# Patient Record
Sex: FEMALE | Race: WHITE | NOT HISPANIC OR LATINO | Employment: OTHER | URBAN - METROPOLITAN AREA
[De-identification: names, ages, dates, MRNs, and addresses within clinical notes are randomized per-mention and may not be internally consistent; named-entity substitution may affect disease eponyms.]

---

## 2017-04-26 ENCOUNTER — OFFICE VISIT (OUTPATIENT)
Dept: FAMILY MEDICINE CLINIC | Facility: CLINIC | Age: 82
End: 2017-04-26

## 2017-04-26 VITALS
HEIGHT: 65 IN | HEART RATE: 70 BPM | RESPIRATION RATE: 16 BRPM | TEMPERATURE: 97.7 F | WEIGHT: 131 LBS | DIASTOLIC BLOOD PRESSURE: 70 MMHG | BODY MASS INDEX: 21.83 KG/M2 | OXYGEN SATURATION: 95 % | SYSTOLIC BLOOD PRESSURE: 110 MMHG

## 2017-04-26 DIAGNOSIS — R60.0 PEDAL EDEMA: ICD-10-CM

## 2017-04-26 DIAGNOSIS — K21.9 GASTROESOPHAGEAL REFLUX DISEASE WITHOUT ESOPHAGITIS: ICD-10-CM

## 2017-04-26 DIAGNOSIS — I10 ESSENTIAL HYPERTENSION: Primary | ICD-10-CM

## 2017-04-26 DIAGNOSIS — E78.2 MIXED HYPERLIPIDEMIA: ICD-10-CM

## 2017-04-26 DIAGNOSIS — I87.2 VENOUS INSUFFICIENCY: ICD-10-CM

## 2017-04-26 DIAGNOSIS — M19.90 ARTHRITIS: ICD-10-CM

## 2017-04-26 PROCEDURE — 99213 OFFICE O/P EST LOW 20 MIN: CPT | Performed by: PHYSICIAN ASSISTANT

## 2017-04-26 NOTE — PROGRESS NOTES
Subjective   Marie Delgado is a 87 y.o. female.     History of Present Illness   Marie Delgado 87 y.o. female who presents today for routine follow up check and medication refills.  she has a history of   Patient Active Problem List   Diagnosis   • Acid reflux   • Hypertension   • Arthritis   • Hyperlipidemia   • Venous insufficiency   • Onychomycosis   • Pedal edema   .  Since the last visit, she has overall felt tired.  She has Hypertenision and is well controlled on medication and GERD and is well controlled on PPI medication.  she has been compliant with current medications have reviewed them.  The patient denies medication side effects.    Still has leg swelling and intolerant to stockings;  Tib/fib erythema and thick skin, not weeping yet    Has tickle cough and declines suggested work up with pulm and cardio;  Still also be GERD r/t  Had CXR Dec 2016   She declines prevention and statin.  She declines cardio work up    Ultram is not helping OA knees and will try Voltaren gel    She is taking Advil and did want her to stop;  Long term use of NSAIDs can lead to heart disease and strokes, as well as GI bleeding/ulcers, and cause kidney disease.     The following portions of the patient's history were reviewed and updated as appropriate: allergies, current medications, past family history, past medical history, past social history, past surgical history and problem list.    Review of Systems   Constitutional: Positive for fatigue. Negative for activity change, appetite change and unexpected weight change.   HENT: Negative for nosebleeds and trouble swallowing.    Eyes: Negative for pain and visual disturbance.   Respiratory: Positive for cough. Negative for chest tightness, shortness of breath and wheezing.    Cardiovascular: Positive for leg swelling. Negative for chest pain and palpitations.   Gastrointestinal: Negative for abdominal pain and blood in stool.   Endocrine: Negative.    Genitourinary: Negative for  difficulty urinating and hematuria.   Musculoskeletal: Positive for arthralgias. Negative for joint swelling.   Skin: Negative for color change and rash.   Allergic/Immunologic: Negative.    Neurological: Negative for syncope and speech difficulty.   Hematological: Negative for adenopathy.   Psychiatric/Behavioral: Negative for agitation and confusion.   All other systems reviewed and are negative.      Objective   Physical Exam   Constitutional: She is oriented to person, place, and time. She appears well-developed and well-nourished. No distress.   HENT:   Head: Normocephalic and atraumatic.   Eyes: Conjunctivae and EOM are normal. Pupils are equal, round, and reactive to light. Right eye exhibits no discharge. Left eye exhibits no discharge. No scleral icterus.   Neck: Normal range of motion. Neck supple. No tracheal deviation present. No thyromegaly present.   Cardiovascular: Normal rate, regular rhythm, normal heart sounds, intact distal pulses and normal pulses.  Exam reveals no gallop.    No murmur heard.  3+ pedal edema;  Venous stasis skin thickening tib/fib; shiny, almost weepy   Pulmonary/Chest: Effort normal and breath sounds normal. No respiratory distress. She has no wheezes. She has no rales.   Abdominal: Soft. There is no tenderness.   Musculoskeletal: Normal range of motion.   Right second toe overlaps first   Neurological: She is alert and oriented to person, place, and time.   Skin: Skin is warm. No rash noted. No erythema. No pallor.   Fungal toenails and thick, overgrown, irreg   Psychiatric: She has a normal mood and affect. Her behavior is normal. Judgment and thought content normal.   Nursing note and vitals reviewed.      Assessment/Plan   Diagnoses and all orders for this visit:    Essential hypertension  -     Comprehensive Metabolic Panel  -     T4, Free  -     TSH  -     CBC & Differential    Venous insufficiency  -     Comprehensive Metabolic Panel  -     T4, Free  -     TSH  -     CBC  & Differential    Pedal edema  -     Comprehensive Metabolic Panel  -     T4, Free  -     TSH  -     CBC & Differential    Gastroesophageal reflux disease without esophagitis  -     Comprehensive Metabolic Panel  -     T4, Free  -     TSH  -     CBC & Differential    Arthritis  -     Comprehensive Metabolic Panel  -     T4, Free  -     TSH  -     CBC & Differential    Mixed hyperlipidemia  -     Comprehensive Metabolic Panel  -     T4, Free  -     TSH  -     CBC & Differential    Other orders  -     diclofenac (VOLTAREN) 1 % gel gel; Apply 4 g topically 4 (Four) Times a Day As Needed (knee pain). Rub in well

## 2017-04-26 NOTE — PATIENT INSTRUCTIONS
I will refill Indapamide after labs    Low glycemic index diet  Exercise 30 minutes most days of the week  Make sure you get results on any labs or tests we ordered today  We discussed medications and how to take them as prescribed  Sleep 6-8 hours each night if possible  If you have not signed up for MyChart, please activate your code ASAP from your After Visit Summary today    LDL goal <100  LDL goal if heart disease <70  HDL goal >60  Triglyceride goal <150  BP goal =<130/80  Fasting glucose <100    Try topical gel for pain    Consider ortho consult or pain management

## 2017-06-06 ENCOUNTER — TELEPHONE (OUTPATIENT)
Dept: FAMILY MEDICINE CLINIC | Facility: CLINIC | Age: 82
End: 2017-06-06

## 2017-06-06 NOTE — TELEPHONE ENCOUNTER
Pt is having a lot of vulvar itch and burning. She has tired OTC things and nothing is working.

## 2017-07-25 ENCOUNTER — OFFICE VISIT (OUTPATIENT)
Dept: FAMILY MEDICINE CLINIC | Facility: CLINIC | Age: 82
End: 2017-07-25

## 2017-07-25 VITALS
DIASTOLIC BLOOD PRESSURE: 90 MMHG | HEIGHT: 65 IN | RESPIRATION RATE: 16 BRPM | TEMPERATURE: 97.7 F | BODY MASS INDEX: 28.49 KG/M2 | WEIGHT: 171 LBS | SYSTOLIC BLOOD PRESSURE: 150 MMHG | HEART RATE: 64 BPM

## 2017-07-25 DIAGNOSIS — E55.9 VITAMIN D DEFICIENCY DISEASE: ICD-10-CM

## 2017-07-25 DIAGNOSIS — I10 ESSENTIAL HYPERTENSION: Primary | ICD-10-CM

## 2017-07-25 DIAGNOSIS — R13.12 OROPHARYNGEAL DYSPHAGIA: ICD-10-CM

## 2017-07-25 DIAGNOSIS — E78.2 MIXED HYPERLIPIDEMIA: ICD-10-CM

## 2017-07-25 DIAGNOSIS — K21.9 GASTROESOPHAGEAL REFLUX DISEASE WITHOUT ESOPHAGITIS: ICD-10-CM

## 2017-07-25 DIAGNOSIS — I87.2 VENOUS INSUFFICIENCY: ICD-10-CM

## 2017-07-25 LAB
25(OH)D3+25(OH)D2 SERPL-MCNC: 35.6 NG/ML (ref 30–100)
ALBUMIN SERPL-MCNC: 4.3 G/DL (ref 3.5–5.2)
ALBUMIN/GLOB SERPL: 1.4 G/DL
ALP SERPL-CCNC: 89 U/L (ref 39–117)
ALT SERPL-CCNC: 12 U/L (ref 1–33)
AST SERPL-CCNC: 17 U/L (ref 1–32)
BASOPHILS # BLD AUTO: 0.05 10*3/MM3 (ref 0–0.2)
BASOPHILS NFR BLD AUTO: 0.8 % (ref 0–1.5)
BILIRUB SERPL-MCNC: 0.6 MG/DL (ref 0.1–1.2)
BUN SERPL-MCNC: 12 MG/DL (ref 8–23)
BUN/CREAT SERPL: 12.6 (ref 7–25)
CALCIUM SERPL-MCNC: 9.9 MG/DL (ref 8.6–10.5)
CHLORIDE SERPL-SCNC: 101 MMOL/L (ref 98–107)
CHOLEST SERPL-MCNC: 239 MG/DL (ref 0–200)
CO2 SERPL-SCNC: 26.6 MMOL/L (ref 22–29)
CREAT SERPL-MCNC: 0.95 MG/DL (ref 0.57–1)
EOSINOPHIL # BLD AUTO: 0.11 10*3/MM3 (ref 0–0.7)
EOSINOPHIL NFR BLD AUTO: 1.7 % (ref 0.3–6.2)
ERYTHROCYTE [DISTWIDTH] IN BLOOD BY AUTOMATED COUNT: 13.4 % (ref 11.7–13)
FOLATE SERPL-MCNC: >20 NG/ML (ref 4.78–24.2)
GLOBULIN SER CALC-MCNC: 3 GM/DL
GLUCOSE SERPL-MCNC: 98 MG/DL (ref 65–99)
HCT VFR BLD AUTO: 39.8 % (ref 35.6–45.5)
HDLC SERPL-MCNC: 61 MG/DL (ref 40–60)
HGB BLD-MCNC: 12.8 G/DL (ref 11.9–15.5)
IMM GRANULOCYTES # BLD: 0 10*3/MM3 (ref 0–0.03)
IMM GRANULOCYTES NFR BLD: 0 % (ref 0–0.5)
LDLC SERPL CALC-MCNC: 159 MG/DL (ref 0–100)
LYMPHOCYTES # BLD AUTO: 1.84 10*3/MM3 (ref 0.9–4.8)
LYMPHOCYTES NFR BLD AUTO: 27.7 % (ref 19.6–45.3)
MCH RBC QN AUTO: 30.9 PG (ref 26.9–32)
MCHC RBC AUTO-ENTMCNC: 32.2 G/DL (ref 32.4–36.3)
MCV RBC AUTO: 96.1 FL (ref 80.5–98.2)
MONOCYTES # BLD AUTO: 0.6 10*3/MM3 (ref 0.2–1.2)
MONOCYTES NFR BLD AUTO: 9 % (ref 5–12)
NEUTROPHILS # BLD AUTO: 4.04 10*3/MM3 (ref 1.9–8.1)
NEUTROPHILS NFR BLD AUTO: 60.8 % (ref 42.7–76)
PLATELET # BLD AUTO: 271 10*3/MM3 (ref 140–500)
POTASSIUM SERPL-SCNC: 3.5 MMOL/L (ref 3.5–5.2)
PROT SERPL-MCNC: 7.3 G/DL (ref 6–8.5)
RBC # BLD AUTO: 4.14 10*6/MM3 (ref 3.9–5.2)
SODIUM SERPL-SCNC: 141 MMOL/L (ref 136–145)
T4 FREE SERPL-MCNC: 1.43 NG/DL (ref 0.93–1.7)
TRIGL SERPL-MCNC: 93 MG/DL (ref 0–150)
TSH SERPL DL<=0.005 MIU/L-ACNC: 3.82 MIU/ML (ref 0.27–4.2)
VIT B12 SERPL-MCNC: 719 PG/ML (ref 211–946)
VLDLC SERPL CALC-MCNC: 18.6 MG/DL (ref 5–40)
WBC # BLD AUTO: 6.64 10*3/MM3 (ref 4.5–10.7)

## 2017-07-25 PROCEDURE — 99213 OFFICE O/P EST LOW 20 MIN: CPT | Performed by: PHYSICIAN ASSISTANT

## 2017-07-25 RX ORDER — FLUCONAZOLE 150 MG/1
150 TABLET ORAL ONCE
Qty: 1 TABLET | Refills: 2 | Status: SHIPPED | OUTPATIENT
Start: 2017-07-25 | End: 2017-07-25

## 2017-07-25 RX ORDER — OMEPRAZOLE 20 MG/1
20 CAPSULE, DELAYED RELEASE ORAL DAILY
Qty: 30 CAPSULE | Refills: 11 | Status: SHIPPED | OUTPATIENT
Start: 2017-07-25 | End: 2018-01-01 | Stop reason: SDUPTHER

## 2017-07-25 RX ORDER — INDAPAMIDE 2.5 MG/1
2.5 TABLET, FILM COATED ORAL EVERY MORNING
Qty: 30 TABLET | Refills: 5 | Status: SHIPPED | OUTPATIENT
Start: 2017-07-25 | End: 2018-01-01 | Stop reason: HOSPADM

## 2017-07-25 NOTE — PATIENT INSTRUCTIONS
Low glycemic index diet  Exercise 30 minutes most days of the week  Make sure you get results on any labs or tests we ordered today  We discussed medications and how to take them as prescribed  Sleep 6-8 hours each night if possible  If you have not signed up for [place], please activate your code ASAP from your After Visit Summary today    LDL goal <100  LDL goal if heart disease <70  HDL goal >60  Triglyceride goal <150  BP goal =<130/80  Fasting glucose <100    Labs today  Restart meds  Follow up one month

## 2017-07-25 NOTE — PROGRESS NOTES
Subjective   Marie Delgado is a 88 y.o. female.     History of Present Illness   Marie Delgado 88 y.o. female who presents for evaluation of dysphagia and hoarseness. Symptoms have been present for several years .  The condition is aggravated by food . she is experiencing dsyphagia.  Alleviating factors are PPI Rx with some help, but still symptoms . Patient denies diarrhea, constipation, abdominal pain, bright red blood in stool, reflux and vomiting her past medical history is notable for GERD.  Patient denies recent travel.      Saw Dr Huerta years ago and had EGD with dilation    Will have her check with pharmacy on crushing pills  She has wounds from venous stasis legs and declines referral to wound and vascular doc    Has not taken her meds in last 5 days;  bp is up today;  Will see if can crush or open meds    Still having yeast vaginitis and the Terazol burned and intolerant;  I will try Diflucan  Need to check if DMII    Having some exertional SOA and declines cardiac and pulm consult  Feet still with lesions    Marie Delgado 88 y.o. female who presents today for routine follow up check and medication refills.  she has a history of   Patient Active Problem List   Diagnosis   • Acid reflux   • Hypertension   • Arthritis   • Hyperlipidemia   • Venous insufficiency   • Onychomycosis   • Pedal edema   • Vitamin D deficiency disease   .  Since the last visit, she has overall felt tired.  She has HTN and not taking Rx last 5 days.  she has been compliant with current medications have reviewed them.  The patient denies medication side effects.  Just hard to swallow.    Her weight is up  Need to consider consult ortho for knee pain    Declines vaccines and screening  Need to see GYN if yeast does not resolve with Diflucan  Here with niece.   The following portions of the patient's history were reviewed and updated as appropriate: allergies, current medications, past family history, past medical history, past social  history, past surgical history and problem list.    Review of Systems   Constitutional: Positive for fatigue. Negative for activity change, appetite change and unexpected weight change.   HENT: Negative for nosebleeds and trouble swallowing.    Eyes: Negative for pain and visual disturbance.   Respiratory: Negative for chest tightness, shortness of breath and wheezing.    Cardiovascular: Negative for chest pain and palpitations.   Gastrointestinal: Negative for abdominal pain and blood in stool.   Endocrine: Negative.    Genitourinary: Positive for vaginal pain. Negative for difficulty urinating and hematuria.   Musculoskeletal: Positive for arthralgias and joint swelling.   Skin: Positive for rash and wound. Negative for color change.   Allergic/Immunologic: Negative.    Neurological: Negative for syncope and speech difficulty.   Hematological: Negative for adenopathy.   Psychiatric/Behavioral: Negative for agitation and confusion.   All other systems reviewed and are negative.      Objective   Physical Exam   Constitutional: She is oriented to person, place, and time. She appears well-developed and well-nourished. No distress.   HENT:   Head: Normocephalic and atraumatic.   Eyes: Conjunctivae and EOM are normal. Pupils are equal, round, and reactive to light. Right eye exhibits no discharge. Left eye exhibits no discharge. No scleral icterus.   Neck: Normal range of motion. Neck supple. No tracheal deviation present. No thyromegaly present.   Cardiovascular: Normal rate, regular rhythm, normal heart sounds, intact distal pulses and normal pulses.  Exam reveals no gallop.    No murmur heard.  3+ pedal edema;  Venous stasis skin thickening tib/fib; shiny, almost weepy   Pulmonary/Chest: Effort normal and breath sounds normal. No respiratory distress. She has no wheezes. She has no rales.   Abdominal: Soft. There is no tenderness.   Musculoskeletal: Normal range of motion.   Right second toe overlaps first    Neurological: She is alert and oriented to person, place, and time.   Skin: Skin is warm. No rash noted. No erythema. No pallor.   Fungal toenails and thick, overgrown, irreg   Psychiatric: She has a normal mood and affect. Her behavior is normal. Judgment and thought content normal.   Nursing note and vitals reviewed.      Assessment/Plan   Problems Addressed this Visit        Cardiovascular and Mediastinum    Hypertension - Primary    Relevant Medications    indapamide (LOZOL) 2.5 MG tablet    Other Relevant Orders    Comprehensive metabolic panel    Lipid panel    CBC and Differential    TSH    T4, Free    Vitamin D 25 Hydroxy    Vitamin B12    Folate    Ambulatory Referral to General Surgery (Completed)    Hyperlipidemia    Relevant Orders    Comprehensive metabolic panel    Lipid panel    CBC and Differential    TSH    T4, Free    Vitamin D 25 Hydroxy    Vitamin B12    Folate    Ambulatory Referral to General Surgery (Completed)    Venous insufficiency    Relevant Orders    Comprehensive metabolic panel    Lipid panel    CBC and Differential    TSH    T4, Free    Vitamin D 25 Hydroxy    Vitamin B12    Folate    Ambulatory Referral to General Surgery (Completed)       Digestive    Acid reflux    Relevant Medications    omeprazole (PRILOSEC) 20 MG capsule    Other Relevant Orders    Comprehensive metabolic panel    Lipid panel    CBC and Differential    TSH    T4, Free    Vitamin D 25 Hydroxy    Vitamin B12    Folate    Ambulatory Referral to General Surgery (Completed)    Vitamin D deficiency disease    Relevant Orders    Comprehensive metabolic panel    Lipid panel    CBC and Differential    TSH    T4, Free    Vitamin D 25 Hydroxy    Vitamin B12    Folate    Ambulatory Referral to General Surgery (Completed)      Other Visit Diagnoses     Oropharyngeal dysphagia        Relevant Orders    Comprehensive metabolic panel    Lipid panel    CBC and Differential    TSH    T4, Free    Vitamin D 25 Hydroxy    Vitamin  B12    Folate    Ambulatory Referral to General Surgery (Completed)

## 2017-07-28 ENCOUNTER — TELEPHONE (OUTPATIENT)
Dept: FAMILY MEDICINE CLINIC | Facility: CLINIC | Age: 82
End: 2017-07-28

## 2017-07-28 RX ORDER — ONDANSETRON 4 MG/1
4 TABLET, FILM COATED ORAL EVERY 8 HOURS PRN
Qty: 20 TABLET | Refills: 0 | Status: SHIPPED | OUTPATIENT
Start: 2017-07-28 | End: 2018-01-01 | Stop reason: ALTCHOICE

## 2017-09-07 ENCOUNTER — TELEPHONE (OUTPATIENT)
Dept: FAMILY MEDICINE CLINIC | Facility: CLINIC | Age: 82
End: 2017-09-07

## 2017-09-07 NOTE — TELEPHONE ENCOUNTER
Ana called states that the tramadol is not helping Marie. She is wanting to know if pt can take two tab.

## 2018-01-01 ENCOUNTER — APPOINTMENT (OUTPATIENT)
Dept: NUCLEAR MEDICINE | Facility: HOSPITAL | Age: 83
End: 2018-01-01

## 2018-01-01 ENCOUNTER — TELEPHONE (OUTPATIENT)
Dept: CARDIOLOGY | Facility: CLINIC | Age: 83
End: 2018-01-01

## 2018-01-01 ENCOUNTER — TELEPHONE (OUTPATIENT)
Dept: FAMILY MEDICINE CLINIC | Facility: CLINIC | Age: 83
End: 2018-01-01

## 2018-01-01 ENCOUNTER — APPOINTMENT (OUTPATIENT)
Dept: CARDIOLOGY | Facility: HOSPITAL | Age: 83
End: 2018-01-01

## 2018-01-01 ENCOUNTER — APPOINTMENT (OUTPATIENT)
Dept: GENERAL RADIOLOGY | Facility: HOSPITAL | Age: 83
End: 2018-01-01

## 2018-01-01 ENCOUNTER — OFFICE VISIT (OUTPATIENT)
Dept: FAMILY MEDICINE CLINIC | Facility: CLINIC | Age: 83
End: 2018-01-01

## 2018-01-01 ENCOUNTER — OFFICE VISIT (OUTPATIENT)
Dept: CARDIOLOGY | Age: 83
End: 2018-01-01

## 2018-01-01 ENCOUNTER — APPOINTMENT (OUTPATIENT)
Dept: CT IMAGING | Facility: HOSPITAL | Age: 83
End: 2018-01-01

## 2018-01-01 ENCOUNTER — HOSPITAL ENCOUNTER (INPATIENT)
Facility: HOSPITAL | Age: 83
LOS: 5 days | End: 2018-12-05
Attending: EMERGENCY MEDICINE | Admitting: INTERNAL MEDICINE

## 2018-01-01 ENCOUNTER — HOSPITAL ENCOUNTER (INPATIENT)
Facility: HOSPITAL | Age: 83
LOS: 3 days | Discharge: HOME | End: 2018-04-07
Attending: EMERGENCY MEDICINE | Admitting: HOSPITALIST

## 2018-01-01 ENCOUNTER — HOSPITAL ENCOUNTER (EMERGENCY)
Facility: HOSPITAL | Age: 83
Discharge: ANOTHER HOSPITAL | End: 2018-11-11
Attending: EMERGENCY MEDICINE | Admitting: EMERGENCY MEDICINE

## 2018-01-01 ENCOUNTER — HOSPITAL ENCOUNTER (INPATIENT)
Facility: HOSPITAL | Age: 83
LOS: 10 days | Discharge: HOME HEALTH | End: 2018-11-21
Attending: SPECIALIST | Admitting: SPECIALIST

## 2018-01-01 ENCOUNTER — HOSPITAL ENCOUNTER (EMERGENCY)
Facility: HOSPITAL | Age: 83
Discharge: HOME | End: 2018-08-18
Attending: EMERGENCY MEDICINE | Admitting: EMERGENCY MEDICINE

## 2018-01-01 ENCOUNTER — HOSPITAL ENCOUNTER (OUTPATIENT)
Facility: HOSPITAL | Age: 83
Discharge: HOME | End: 2018-04-10
Attending: INTERNAL MEDICINE | Admitting: INTERNAL MEDICINE

## 2018-01-01 ENCOUNTER — HOSPITAL ENCOUNTER (INPATIENT)
Facility: HOSPITAL | Age: 83
LOS: 1 days | End: 2018-12-06
Attending: INTERNAL MEDICINE | Admitting: INTERNAL MEDICINE

## 2018-01-01 ENCOUNTER — OFFICE VISIT (OUTPATIENT)
Dept: CARDIOLOGY | Facility: CLINIC | Age: 83
End: 2018-01-01

## 2018-01-01 ENCOUNTER — RESULTS ENCOUNTER (OUTPATIENT)
Dept: FAMILY MEDICINE CLINIC | Facility: CLINIC | Age: 83
End: 2018-01-01

## 2018-01-01 VITALS
WEIGHT: 154.6 LBS | DIASTOLIC BLOOD PRESSURE: 91 MMHG | HEART RATE: 85 BPM | BODY MASS INDEX: 27.39 KG/M2 | OXYGEN SATURATION: 93 % | RESPIRATION RATE: 18 BRPM | HEIGHT: 63 IN | TEMPERATURE: 97.9 F | SYSTOLIC BLOOD PRESSURE: 135 MMHG

## 2018-01-01 VITALS
TEMPERATURE: 97.6 F | WEIGHT: 138 LBS | SYSTOLIC BLOOD PRESSURE: 96 MMHG | BODY MASS INDEX: 20.44 KG/M2 | DIASTOLIC BLOOD PRESSURE: 75 MMHG | HEIGHT: 69 IN | HEART RATE: 101 BPM | RESPIRATION RATE: 16 BRPM | OXYGEN SATURATION: 97 %

## 2018-01-01 VITALS
TEMPERATURE: 98.3 F | RESPIRATION RATE: 16 BRPM | WEIGHT: 154 LBS | SYSTOLIC BLOOD PRESSURE: 110 MMHG | OXYGEN SATURATION: 95 % | HEART RATE: 60 BPM | BODY MASS INDEX: 27.29 KG/M2 | HEIGHT: 63 IN | DIASTOLIC BLOOD PRESSURE: 72 MMHG

## 2018-01-01 VITALS
HEART RATE: 92 BPM | RESPIRATION RATE: 19 BRPM | WEIGHT: 135 LBS | OXYGEN SATURATION: 94 % | SYSTOLIC BLOOD PRESSURE: 104 MMHG | HEIGHT: 64 IN | BODY MASS INDEX: 23.05 KG/M2 | DIASTOLIC BLOOD PRESSURE: 83 MMHG | TEMPERATURE: 98.1 F

## 2018-01-01 VITALS
HEIGHT: 62 IN | WEIGHT: 153 LBS | BODY MASS INDEX: 28.16 KG/M2 | DIASTOLIC BLOOD PRESSURE: 77 MMHG | SYSTOLIC BLOOD PRESSURE: 117 MMHG | HEART RATE: 98 BPM

## 2018-01-01 VITALS
HEART RATE: 88 BPM | SYSTOLIC BLOOD PRESSURE: 110 MMHG | HEIGHT: 64 IN | DIASTOLIC BLOOD PRESSURE: 72 MMHG | TEMPERATURE: 98.5 F | RESPIRATION RATE: 16 BRPM | OXYGEN SATURATION: 96 %

## 2018-01-01 VITALS
WEIGHT: 134.48 LBS | RESPIRATION RATE: 16 BRPM | TEMPERATURE: 97.2 F | HEART RATE: 108 BPM | OXYGEN SATURATION: 98 % | DIASTOLIC BLOOD PRESSURE: 67 MMHG | HEIGHT: 64 IN | BODY MASS INDEX: 22.96 KG/M2 | SYSTOLIC BLOOD PRESSURE: 110 MMHG

## 2018-01-01 VITALS
HEIGHT: 66 IN | HEART RATE: 90 BPM | SYSTOLIC BLOOD PRESSURE: 117 MMHG | BODY MASS INDEX: 23.78 KG/M2 | DIASTOLIC BLOOD PRESSURE: 74 MMHG | WEIGHT: 148 LBS

## 2018-01-01 VITALS
SYSTOLIC BLOOD PRESSURE: 110 MMHG | RESPIRATION RATE: 16 BRPM | WEIGHT: 153 LBS | BODY MASS INDEX: 28.16 KG/M2 | TEMPERATURE: 97.7 F | HEIGHT: 62 IN | OXYGEN SATURATION: 95 % | HEART RATE: 90 BPM | DIASTOLIC BLOOD PRESSURE: 60 MMHG

## 2018-01-01 VITALS
WEIGHT: 136.69 LBS | BODY MASS INDEX: 20.18 KG/M2 | SYSTOLIC BLOOD PRESSURE: 122 MMHG | RESPIRATION RATE: 16 BRPM | OXYGEN SATURATION: 95 % | HEART RATE: 100 BPM | DIASTOLIC BLOOD PRESSURE: 74 MMHG | TEMPERATURE: 96.5 F

## 2018-01-01 VITALS
DIASTOLIC BLOOD PRESSURE: 84 MMHG | HEIGHT: 64 IN | OXYGEN SATURATION: 96 % | RESPIRATION RATE: 18 BRPM | HEART RATE: 88 BPM | WEIGHT: 154.25 LBS | SYSTOLIC BLOOD PRESSURE: 124 MMHG | TEMPERATURE: 97.6 F | BODY MASS INDEX: 26.34 KG/M2

## 2018-01-01 DIAGNOSIS — L60.8 TOENAIL DEFORMITY: ICD-10-CM

## 2018-01-01 DIAGNOSIS — I87.2 VENOUS INSUFFICIENCY: ICD-10-CM

## 2018-01-01 DIAGNOSIS — E87.6 HYPOKALEMIA: Primary | ICD-10-CM

## 2018-01-01 DIAGNOSIS — I10 ESSENTIAL HYPERTENSION: ICD-10-CM

## 2018-01-01 DIAGNOSIS — I48.91 NEW ONSET ATRIAL FIBRILLATION (HCC): ICD-10-CM

## 2018-01-01 DIAGNOSIS — N83.8 OVARIAN MASS: ICD-10-CM

## 2018-01-01 DIAGNOSIS — R94.39 ABNORMAL STRESS TEST: ICD-10-CM

## 2018-01-01 DIAGNOSIS — I25.10 CORONARY ARTERY DISEASE INVOLVING NATIVE CORONARY ARTERY OF NATIVE HEART, ANGINA PRESENCE UNSPECIFIED: Primary | ICD-10-CM

## 2018-01-01 DIAGNOSIS — E78.2 MIXED HYPERLIPIDEMIA: ICD-10-CM

## 2018-01-01 DIAGNOSIS — Z86.79 HISTORY OF HEART DISEASE: ICD-10-CM

## 2018-01-01 DIAGNOSIS — Z86.79 PERSONAL HISTORY OF ATRIAL FIBRILLATION: ICD-10-CM

## 2018-01-01 DIAGNOSIS — E87.6 HYPOKALEMIA: ICD-10-CM

## 2018-01-01 DIAGNOSIS — N30.00 ACUTE CYSTITIS WITHOUT HEMATURIA: ICD-10-CM

## 2018-01-01 DIAGNOSIS — F23 ACUTE PSYCHOSIS (HCC): Primary | ICD-10-CM

## 2018-01-01 DIAGNOSIS — R06.00 DYSPNEA, UNSPECIFIED TYPE: Primary | ICD-10-CM

## 2018-01-01 DIAGNOSIS — E03.9 ACQUIRED HYPOTHYROIDISM: ICD-10-CM

## 2018-01-01 DIAGNOSIS — R25.1 TREMOR: Primary | ICD-10-CM

## 2018-01-01 DIAGNOSIS — K81.0 ACUTE CHOLECYSTITIS: ICD-10-CM

## 2018-01-01 DIAGNOSIS — I48.91 NEW ONSET ATRIAL FIBRILLATION (HCC): Primary | ICD-10-CM

## 2018-01-01 DIAGNOSIS — R41.82 ALTERED MENTAL STATUS, UNSPECIFIED ALTERED MENTAL STATUS TYPE: ICD-10-CM

## 2018-01-01 DIAGNOSIS — I10 ESSENTIAL HYPERTENSION: Primary | ICD-10-CM

## 2018-01-01 DIAGNOSIS — R60.0 PEDAL EDEMA: ICD-10-CM

## 2018-01-01 DIAGNOSIS — I25.10 CORONARY ARTERY DISEASE INVOLVING NATIVE CORONARY ARTERY OF NATIVE HEART WITHOUT ANGINA PECTORIS: ICD-10-CM

## 2018-01-01 DIAGNOSIS — I50.9 ACUTE CONGESTIVE HEART FAILURE, UNSPECIFIED CONGESTIVE HEART FAILURE TYPE: ICD-10-CM

## 2018-01-01 DIAGNOSIS — Z09 HOSPITAL DISCHARGE FOLLOW-UP: Primary | ICD-10-CM

## 2018-01-01 DIAGNOSIS — R65.21 SEPTIC SHOCK (HCC): Primary | ICD-10-CM

## 2018-01-01 DIAGNOSIS — I25.10 CORONARY ARTERY DISEASE INVOLVING NATIVE CORONARY ARTERY OF NATIVE HEART WITHOUT ANGINA PECTORIS: Primary | ICD-10-CM

## 2018-01-01 DIAGNOSIS — A41.9 SEPTIC SHOCK (HCC): Primary | ICD-10-CM

## 2018-01-01 DIAGNOSIS — Z09 HOSPITAL DISCHARGE FOLLOW-UP: ICD-10-CM

## 2018-01-01 DIAGNOSIS — M25.511 ACUTE PAIN OF RIGHT SHOULDER: Primary | ICD-10-CM

## 2018-01-01 DIAGNOSIS — M20.60 DEFORMITY OF TOE, UNSPECIFIED LATERALITY: ICD-10-CM

## 2018-01-01 LAB
ALBUMIN SERPL-MCNC: 2.2 G/DL (ref 3.5–5.2)
ALBUMIN SERPL-MCNC: 3.2 G/DL (ref 3.5–5.2)
ALBUMIN SERPL-MCNC: 3.6 G/DL (ref 3.5–5.2)
ALBUMIN SERPL-MCNC: 3.8 G/DL (ref 3.5–5.2)
ALBUMIN SERPL-MCNC: 4.2 G/DL (ref 3.5–5.2)
ALBUMIN/GLOB SERPL: 0.6 G/DL
ALBUMIN/GLOB SERPL: 0.8 G/DL
ALBUMIN/GLOB SERPL: 1 G/DL
ALBUMIN/GLOB SERPL: 1.2 G/DL
ALBUMIN/GLOB SERPL: 1.3 G/DL
ALBUMIN/GLOB SERPL: 1.4 G/DL
ALBUMIN/GLOB SERPL: 1.7 G/DL
ALP SERPL-CCNC: 153 U/L (ref 39–117)
ALP SERPL-CCNC: 213 U/L (ref 39–117)
ALP SERPL-CCNC: 87 U/L (ref 39–117)
ALP SERPL-CCNC: 88 U/L (ref 39–117)
ALP SERPL-CCNC: 89 U/L (ref 39–117)
ALP SERPL-CCNC: 90 U/L (ref 39–117)
ALP SERPL-CCNC: 92 U/L (ref 39–117)
ALT SERPL W P-5'-P-CCNC: 10 U/L (ref 1–33)
ALT SERPL W P-5'-P-CCNC: 34 U/L (ref 1–33)
ALT SERPL W P-5'-P-CCNC: 41 U/L (ref 1–33)
ALT SERPL W P-5'-P-CCNC: 59 U/L (ref 1–33)
ALT SERPL W P-5'-P-CCNC: 7 U/L (ref 1–33)
ALT SERPL-CCNC: 5 U/L (ref 1–33)
ALT SERPL-CCNC: 6 U/L (ref 1–33)
AMMONIA BLD-SCNC: 27 UMOL/L (ref 11–51)
ANION GAP SERPL CALCULATED.3IONS-SCNC: 12 MMOL/L
ANION GAP SERPL CALCULATED.3IONS-SCNC: 12.2 MMOL/L
ANION GAP SERPL CALCULATED.3IONS-SCNC: 12.8 MMOL/L
ANION GAP SERPL CALCULATED.3IONS-SCNC: 13 MMOL/L
ANION GAP SERPL CALCULATED.3IONS-SCNC: 13.2 MMOL/L
ANION GAP SERPL CALCULATED.3IONS-SCNC: 14.1 MMOL/L
ANION GAP SERPL CALCULATED.3IONS-SCNC: 14.1 MMOL/L
ANION GAP SERPL CALCULATED.3IONS-SCNC: 14.9 MMOL/L
ANION GAP SERPL CALCULATED.3IONS-SCNC: 15.2 MMOL/L
ANION GAP SERPL CALCULATED.3IONS-SCNC: 15.4 MMOL/L
ANION GAP SERPL CALCULATED.3IONS-SCNC: 15.7 MMOL/L
ANION GAP SERPL CALCULATED.3IONS-SCNC: 16.1 MMOL/L
ANION GAP SERPL CALCULATED.3IONS-SCNC: 17.5 MMOL/L
ANION GAP SERPL CALCULATED.3IONS-SCNC: 17.6 MMOL/L
ASCENDING AORTA: 3.1 CM
AST SERPL-CCNC: 13 U/L (ref 1–32)
AST SERPL-CCNC: 20 U/L (ref 1–32)
AST SERPL-CCNC: 33 U/L (ref 1–32)
AST SERPL-CCNC: 50 U/L (ref 1–32)
AST SERPL-CCNC: 7 U/L (ref 1–32)
AST SERPL-CCNC: 8 U/L (ref 1–32)
AST SERPL-CCNC: 9 U/L (ref 1–32)
BACTERIA BLD CULT: NORMAL
BACTERIA SPEC AEROBE CULT: ABNORMAL
BACTERIA SPEC AEROBE CULT: ABNORMAL
BACTERIA SPEC AEROBE CULT: NORMAL
BACTERIA UR QL AUTO: ABNORMAL /HPF
BASOPHILS # BLD AUTO: 0 10*3/MM3 (ref 0–0.2)
BASOPHILS # BLD AUTO: 0.01 10*3/MM3 (ref 0–0.2)
BASOPHILS # BLD AUTO: 0.02 10*3/MM3 (ref 0–0.2)
BASOPHILS # BLD AUTO: 0.03 10*3/MM3 (ref 0–0.2)
BASOPHILS # BLD AUTO: 0.04 10*3/MM3 (ref 0–0.2)
BASOPHILS NFR BLD AUTO: 0 % (ref 0–1.5)
BASOPHILS NFR BLD AUTO: 0.1 % (ref 0–1.5)
BASOPHILS NFR BLD AUTO: 0.1 % (ref 0–1.5)
BASOPHILS NFR BLD AUTO: 0.2 % (ref 0–1.5)
BASOPHILS NFR BLD AUTO: 0.3 % (ref 0–1.5)
BASOPHILS NFR BLD AUTO: 0.3 % (ref 0–1.5)
BASOPHILS NFR BLD AUTO: 0.4 % (ref 0–1.5)
BASOPHILS NFR BLD AUTO: 0.5 % (ref 0–1.5)
BH CV ECHO MEAS - ACS: 1.5 CM
BH CV ECHO MEAS - AI DEC SLOPE: 186 CM/SEC^2
BH CV ECHO MEAS - AI MAX PG: 42.8 MMHG
BH CV ECHO MEAS - AI MAX VEL: 327 CM/SEC
BH CV ECHO MEAS - AI P1/2T: 514.9 MSEC
BH CV ECHO MEAS - AO MAX PG (FULL): 3.9 MMHG
BH CV ECHO MEAS - AO MAX PG: 5.7 MMHG
BH CV ECHO MEAS - AO MEAN PG (FULL): 2 MMHG
BH CV ECHO MEAS - AO MEAN PG: 3 MMHG
BH CV ECHO MEAS - AO ROOT AREA (BSA CORRECTED): 1.4
BH CV ECHO MEAS - AO ROOT AREA: 5.3 CM^2
BH CV ECHO MEAS - AO ROOT DIAM: 2.6 CM
BH CV ECHO MEAS - AO V2 MAX: 119 CM/SEC
BH CV ECHO MEAS - AO V2 MEAN: 77.5 CM/SEC
BH CV ECHO MEAS - AO V2 VTI: 18.1 CM
BH CV ECHO MEAS - ASC AORTA: 3.1 CM
BH CV ECHO MEAS - AVA(I,A): 1.9 CM^2
BH CV ECHO MEAS - AVA(I,D): 1.9 CM^2
BH CV ECHO MEAS - AVA(V,A): 1.8 CM^2
BH CV ECHO MEAS - AVA(V,D): 1.8 CM^2
BH CV ECHO MEAS - BSA(HAYCOCK): 1.9 M^2
BH CV ECHO MEAS - BSA: 1.8 M^2
BH CV ECHO MEAS - BZI_BMI: 29.2 KILOGRAMS/M^2
BH CV ECHO MEAS - BZI_METRIC_HEIGHT: 162.6 CM
BH CV ECHO MEAS - BZI_METRIC_WEIGHT: 77.1 KG
BH CV ECHO MEAS - CONTRAST EF (2CH): 51.1 ML/M^2
BH CV ECHO MEAS - CONTRAST EF 4CH: 44.2 ML/M^2
BH CV ECHO MEAS - EDV(CUBED): 148.9 ML
BH CV ECHO MEAS - EDV(MOD-SP2): 182 ML
BH CV ECHO MEAS - EDV(MOD-SP4): 120 ML
BH CV ECHO MEAS - EDV(TEICH): 135.3 ML
BH CV ECHO MEAS - EF(CUBED): 46.6 %
BH CV ECHO MEAS - EF(MOD-SP2): 51.1 %
BH CV ECHO MEAS - EF(MOD-SP4): 50 %
BH CV ECHO MEAS - EF(TEICH): 38.6 %
BH CV ECHO MEAS - ESV(CUBED): 79.5 ML
BH CV ECHO MEAS - ESV(MOD-SP2): 89 ML
BH CV ECHO MEAS - ESV(MOD-SP4): 67 ML
BH CV ECHO MEAS - ESV(TEICH): 83.1 ML
BH CV ECHO MEAS - FS: 18.9 %
BH CV ECHO MEAS - IVS/LVPW: 1
BH CV ECHO MEAS - IVSD: 0.9 CM
BH CV ECHO MEAS - LAT PEAK E' VEL: 13 CM/SEC
BH CV ECHO MEAS - LV DIASTOLIC VOL/BSA (35-75): 65.7 ML/M^2
BH CV ECHO MEAS - LV MASS(C)D: 174.5 GRAMS
BH CV ECHO MEAS - LV MASS(C)DI: 95.6 GRAMS/M^2
BH CV ECHO MEAS - LV MAX PG: 1.8 MMHG
BH CV ECHO MEAS - LV MEAN PG: 1 MMHG
BH CV ECHO MEAS - LV SYSTOLIC VOL/BSA (12-30): 36.7 ML/M^2
BH CV ECHO MEAS - LV V1 MAX: 67.2 CM/SEC
BH CV ECHO MEAS - LV V1 MEAN: 43.2 CM/SEC
BH CV ECHO MEAS - LV V1 VTI: 11.1 CM
BH CV ECHO MEAS - LVIDD: 5.3 CM
BH CV ECHO MEAS - LVIDS: 4.3 CM
BH CV ECHO MEAS - LVLD AP2: 8.8 CM
BH CV ECHO MEAS - LVLD AP4: 9.2 CM
BH CV ECHO MEAS - LVLS AP2: 8.7 CM
BH CV ECHO MEAS - LVLS AP4: 8.5 CM
BH CV ECHO MEAS - LVOT AREA (M): 3.1 CM^2
BH CV ECHO MEAS - LVOT AREA: 3.1 CM^2
BH CV ECHO MEAS - LVOT DIAM: 2 CM
BH CV ECHO MEAS - LVPWD: 0.9 CM
BH CV ECHO MEAS - MED PEAK E' VEL: 8 CM/SEC
BH CV ECHO MEAS - MR MAX PG: 106.9 MMHG
BH CV ECHO MEAS - MR MAX VEL: 517 CM/SEC
BH CV ECHO MEAS - MV DEC SLOPE: 801 CM/SEC^2
BH CV ECHO MEAS - MV DEC TIME: 0.13 SEC
BH CV ECHO MEAS - MV E MAX VEL: 141 CM/SEC
BH CV ECHO MEAS - MV MAX PG: 8 MMHG
BH CV ECHO MEAS - MV MEAN PG: 3 MMHG
BH CV ECHO MEAS - MV P1/2T MAX VEL: 145 CM/SEC
BH CV ECHO MEAS - MV P1/2T: 53 MSEC
BH CV ECHO MEAS - MV V2 MAX: 141 CM/SEC
BH CV ECHO MEAS - MV V2 MEAN: 79.2 CM/SEC
BH CV ECHO MEAS - MV V2 VTI: 19.1 CM
BH CV ECHO MEAS - MVA P1/2T LCG: 1.5 CM^2
BH CV ECHO MEAS - MVA(P1/2T): 4.1 CM^2
BH CV ECHO MEAS - MVA(VTI): 1.8 CM^2
BH CV ECHO MEAS - PA ACC TIME: 0.09 SEC
BH CV ECHO MEAS - PA MAX PG (FULL): 1.5 MMHG
BH CV ECHO MEAS - PA MAX PG: 2.1 MMHG
BH CV ECHO MEAS - PA PR(ACCEL): 40.8 MMHG
BH CV ECHO MEAS - PA V2 MAX: 73.3 CM/SEC
BH CV ECHO MEAS - PULM DIAS VEL: 66 CM/SEC
BH CV ECHO MEAS - PULM S/D: 0.43
BH CV ECHO MEAS - PULM SYS VEL: 28.4 CM/SEC
BH CV ECHO MEAS - PVA(V,A): 1.7 CM^2
BH CV ECHO MEAS - PVA(V,D): 1.7 CM^2
BH CV ECHO MEAS - QP/QS: 0.49
BH CV ECHO MEAS - RAP SYSTOLE: 3 MMHG
BH CV ECHO MEAS - RV MAX PG: 0.64 MMHG
BH CV ECHO MEAS - RV MEAN PG: 0 MMHG
BH CV ECHO MEAS - RV V1 MAX: 39.9 CM/SEC
BH CV ECHO MEAS - RV V1 MEAN: 23.4 CM/SEC
BH CV ECHO MEAS - RV V1 VTI: 5.5 CM
BH CV ECHO MEAS - RVOT AREA: 3.1 CM^2
BH CV ECHO MEAS - RVOT DIAM: 2 CM
BH CV ECHO MEAS - RVSP: 43.7 MMHG
BH CV ECHO MEAS - SI(AO): 52.6 ML/M^2
BH CV ECHO MEAS - SI(CUBED): 38 ML/M^2
BH CV ECHO MEAS - SI(LVOT): 19.1 ML/M^2
BH CV ECHO MEAS - SI(MOD-SP2): 50.9 ML/M^2
BH CV ECHO MEAS - SI(MOD-SP4): 29 ML/M^2
BH CV ECHO MEAS - SI(TEICH): 28.6 ML/M^2
BH CV ECHO MEAS - SV(AO): 96.1 ML
BH CV ECHO MEAS - SV(CUBED): 69.4 ML
BH CV ECHO MEAS - SV(LVOT): 34.9 ML
BH CV ECHO MEAS - SV(MOD-SP2): 93 ML
BH CV ECHO MEAS - SV(MOD-SP4): 53 ML
BH CV ECHO MEAS - SV(RVOT): 17.2 ML
BH CV ECHO MEAS - SV(TEICH): 52.3 ML
BH CV ECHO MEAS - TAPSE (>1.6): 1.7 CM2
BH CV ECHO MEAS - TR MAX VEL: 319 CM/SEC
BH CV LOWER VASCULAR LEFT COMMON FEMORAL AUGMENT: NORMAL
BH CV LOWER VASCULAR LEFT COMMON FEMORAL COMPETENT: NORMAL
BH CV LOWER VASCULAR LEFT COMMON FEMORAL COMPRESS: NORMAL
BH CV LOWER VASCULAR LEFT COMMON FEMORAL PHASIC: NORMAL
BH CV LOWER VASCULAR LEFT COMMON FEMORAL SPONT: NORMAL
BH CV LOWER VASCULAR LEFT DISTAL FEMORAL COMPRESS: NORMAL
BH CV LOWER VASCULAR LEFT GASTRONEMIUS COMPRESS: NORMAL
BH CV LOWER VASCULAR LEFT GREATER SAPH AK COMPRESS: NORMAL
BH CV LOWER VASCULAR LEFT GREATER SAPH BK COMPRESS: NORMAL
BH CV LOWER VASCULAR LEFT MID FEMORAL AUGMENT: NORMAL
BH CV LOWER VASCULAR LEFT MID FEMORAL COMPETENT: NORMAL
BH CV LOWER VASCULAR LEFT MID FEMORAL COMPRESS: NORMAL
BH CV LOWER VASCULAR LEFT MID FEMORAL PHASIC: NORMAL
BH CV LOWER VASCULAR LEFT MID FEMORAL SPONT: NORMAL
BH CV LOWER VASCULAR LEFT PERONEAL COMPRESS: NORMAL
BH CV LOWER VASCULAR LEFT POPLITEAL AUGMENT: NORMAL
BH CV LOWER VASCULAR LEFT POPLITEAL COMPETENT: NORMAL
BH CV LOWER VASCULAR LEFT POPLITEAL COMPRESS: NORMAL
BH CV LOWER VASCULAR LEFT POPLITEAL PHASIC: NORMAL
BH CV LOWER VASCULAR LEFT POPLITEAL SPONT: NORMAL
BH CV LOWER VASCULAR LEFT POSTERIOR TIBIAL COMPRESS: NORMAL
BH CV LOWER VASCULAR LEFT PROXIMAL FEMORAL COMPRESS: NORMAL
BH CV LOWER VASCULAR LEFT SAPHENOFEMORAL JUNCTION AUGMENT: NORMAL
BH CV LOWER VASCULAR LEFT SAPHENOFEMORAL JUNCTION COMPETENT: NORMAL
BH CV LOWER VASCULAR LEFT SAPHENOFEMORAL JUNCTION COMPRESS: NORMAL
BH CV LOWER VASCULAR LEFT SAPHENOFEMORAL JUNCTION PHASIC: NORMAL
BH CV LOWER VASCULAR LEFT SAPHENOFEMORAL JUNCTION SPONT: NORMAL
BH CV LOWER VASCULAR RIGHT COMMON FEMORAL AUGMENT: NORMAL
BH CV LOWER VASCULAR RIGHT COMMON FEMORAL COMPETENT: NORMAL
BH CV LOWER VASCULAR RIGHT COMMON FEMORAL COMPRESS: NORMAL
BH CV LOWER VASCULAR RIGHT COMMON FEMORAL PHASIC: NORMAL
BH CV LOWER VASCULAR RIGHT COMMON FEMORAL SPONT: NORMAL
BH CV LOWER VASCULAR RIGHT DISTAL FEMORAL COMPRESS: NORMAL
BH CV LOWER VASCULAR RIGHT GASTRONEMIUS COMPRESS: NORMAL
BH CV LOWER VASCULAR RIGHT GREATER SAPH AK COMPRESS: NORMAL
BH CV LOWER VASCULAR RIGHT GREATER SAPH BK COMPRESS: NORMAL
BH CV LOWER VASCULAR RIGHT MID FEMORAL AUGMENT: NORMAL
BH CV LOWER VASCULAR RIGHT MID FEMORAL COMPETENT: NORMAL
BH CV LOWER VASCULAR RIGHT MID FEMORAL COMPRESS: NORMAL
BH CV LOWER VASCULAR RIGHT MID FEMORAL PHASIC: NORMAL
BH CV LOWER VASCULAR RIGHT MID FEMORAL SPONT: NORMAL
BH CV LOWER VASCULAR RIGHT PERONEAL COMPRESS: NORMAL
BH CV LOWER VASCULAR RIGHT POPLITEAL AUGMENT: NORMAL
BH CV LOWER VASCULAR RIGHT POPLITEAL COMPETENT: NORMAL
BH CV LOWER VASCULAR RIGHT POPLITEAL COMPRESS: NORMAL
BH CV LOWER VASCULAR RIGHT POPLITEAL PHASIC: NORMAL
BH CV LOWER VASCULAR RIGHT POPLITEAL SPONT: NORMAL
BH CV LOWER VASCULAR RIGHT POSTERIOR TIBIAL COMPRESS: NORMAL
BH CV LOWER VASCULAR RIGHT PROXIMAL FEMORAL COMPRESS: NORMAL
BH CV LOWER VASCULAR RIGHT SAPHENOFEMORAL JUNCTION AUGMENT: NORMAL
BH CV LOWER VASCULAR RIGHT SAPHENOFEMORAL JUNCTION COMPETENT: NORMAL
BH CV LOWER VASCULAR RIGHT SAPHENOFEMORAL JUNCTION COMPRESS: NORMAL
BH CV LOWER VASCULAR RIGHT SAPHENOFEMORAL JUNCTION PHASIC: NORMAL
BH CV LOWER VASCULAR RIGHT SAPHENOFEMORAL JUNCTION SPONT: NORMAL
BH CV STRESS COMMENTS STAGE 1: NORMAL
BH CV STRESS DOSE REGADENOSON STAGE 1: 0.4
BH CV STRESS DURATION MIN STAGE 1: 0
BH CV STRESS DURATION SEC STAGE 1: 10
BH CV STRESS PROTOCOL 1: NORMAL
BH CV STRESS RECOVERY BP: NORMAL MMHG
BH CV STRESS RECOVERY HR: 120 BPM
BH CV STRESS STAGE 1: 1
BH CV VAS BP RIGHT ARM: NORMAL MMHG
BH CV XLRA - RV BASE: 3.5 CM
BH CV XLRA - TDI S': 8 CM/SEC
BILIRUB BLD-MCNC: NEGATIVE MG/DL
BILIRUB SERPL-MCNC: 0.6 MG/DL (ref 0.1–1.2)
BILIRUB SERPL-MCNC: 0.8 MG/DL (ref 0.1–1.2)
BILIRUB SERPL-MCNC: 1 MG/DL (ref 0.1–1.2)
BILIRUB SERPL-MCNC: 1 MG/DL (ref 0.1–1.2)
BILIRUB SERPL-MCNC: 1.1 MG/DL (ref 0.1–1.2)
BILIRUB SERPL-MCNC: 1.1 MG/DL (ref 0.1–1.2)
BILIRUB SERPL-MCNC: 1.4 MG/DL (ref 0.1–1.2)
BILIRUB UR QL STRIP: NEGATIVE
BUN BLD-MCNC: 10 MG/DL (ref 8–23)
BUN BLD-MCNC: 13 MG/DL (ref 8–23)
BUN BLD-MCNC: 16 MG/DL (ref 8–23)
BUN BLD-MCNC: 16 MG/DL (ref 8–23)
BUN BLD-MCNC: 18 MG/DL (ref 8–23)
BUN BLD-MCNC: 20 MG/DL (ref 8–23)
BUN BLD-MCNC: 25 MG/DL (ref 8–23)
BUN BLD-MCNC: 33 MG/DL (ref 8–23)
BUN BLD-MCNC: 38 MG/DL (ref 8–23)
BUN BLD-MCNC: 39 MG/DL (ref 8–23)
BUN BLD-MCNC: 9 MG/DL (ref 8–23)
BUN SERPL-MCNC: 14 MG/DL (ref 8–23)
BUN SERPL-MCNC: 15 MG/DL (ref 8–23)
BUN SERPL-MCNC: 19 MG/DL (ref 8–23)
BUN/CREAT SERPL: 11.4 (ref 7–25)
BUN/CREAT SERPL: 12.1 (ref 7–25)
BUN/CREAT SERPL: 14.1 (ref 7–25)
BUN/CREAT SERPL: 15 (ref 7–25)
BUN/CREAT SERPL: 15.2 (ref 7–25)
BUN/CREAT SERPL: 16.8 (ref 7–25)
BUN/CREAT SERPL: 17.2 (ref 7–25)
BUN/CREAT SERPL: 18.4 (ref 7–25)
BUN/CREAT SERPL: 18.6 (ref 7–25)
BUN/CREAT SERPL: 19.6 (ref 7–25)
BUN/CREAT SERPL: 20.4 (ref 7–25)
BUN/CREAT SERPL: 20.5 (ref 7–25)
BUN/CREAT SERPL: 21.3 (ref 7–25)
BUN/CREAT SERPL: 23.2 (ref 7–25)
BUN/CREAT SERPL: 26.8 (ref 7–25)
BUN/CREAT SERPL: 27.2 (ref 7–25)
BUN/CREAT SERPL: 27.7 (ref 7–25)
CALCIUM SERPL-MCNC: 9.3 MG/DL (ref 8.6–10.5)
CALCIUM SERPL-MCNC: 9.4 MG/DL (ref 8.6–10.5)
CALCIUM SERPL-MCNC: 9.7 MG/DL (ref 8.6–10.5)
CALCIUM SPEC-SCNC: 8.4 MG/DL (ref 8.6–10.5)
CALCIUM SPEC-SCNC: 8.4 MG/DL (ref 8.6–10.5)
CALCIUM SPEC-SCNC: 8.6 MG/DL (ref 8.6–10.5)
CALCIUM SPEC-SCNC: 8.9 MG/DL (ref 8.6–10.5)
CALCIUM SPEC-SCNC: 9 MG/DL (ref 8.6–10.5)
CALCIUM SPEC-SCNC: 9 MG/DL (ref 8.6–10.5)
CALCIUM SPEC-SCNC: 9.1 MG/DL (ref 8.6–10.5)
CALCIUM SPEC-SCNC: 9.2 MG/DL (ref 8.6–10.5)
CALCIUM SPEC-SCNC: 9.3 MG/DL (ref 8.6–10.5)
CALCIUM SPEC-SCNC: 9.3 MG/DL (ref 8.6–10.5)
CALCIUM SPEC-SCNC: 9.4 MG/DL (ref 8.6–10.5)
CALCIUM SPEC-SCNC: 9.4 MG/DL (ref 8.6–10.5)
CALCIUM SPEC-SCNC: 9.6 MG/DL (ref 8.6–10.5)
CALCIUM SPEC-SCNC: 9.6 MG/DL (ref 8.6–10.5)
CHLORIDE SERPL-SCNC: 100 MMOL/L (ref 98–107)
CHLORIDE SERPL-SCNC: 100 MMOL/L (ref 98–107)
CHLORIDE SERPL-SCNC: 101 MMOL/L (ref 98–107)
CHLORIDE SERPL-SCNC: 101 MMOL/L (ref 98–107)
CHLORIDE SERPL-SCNC: 102 MMOL/L (ref 98–107)
CHLORIDE SERPL-SCNC: 103 MMOL/L (ref 98–107)
CHLORIDE SERPL-SCNC: 105 MMOL/L (ref 98–107)
CHLORIDE SERPL-SCNC: 106 MMOL/L (ref 98–107)
CHLORIDE SERPL-SCNC: 106 MMOL/L (ref 98–107)
CHLORIDE SERPL-SCNC: 107 MMOL/L (ref 98–107)
CHLORIDE SERPL-SCNC: 108 MMOL/L (ref 98–107)
CHLORIDE SERPL-SCNC: 109 MMOL/L (ref 98–107)
CHLORIDE SERPL-SCNC: 98 MMOL/L (ref 98–107)
CHOLEST SERPL-MCNC: 154 MG/DL (ref 0–200)
CHOLEST SERPL-MCNC: 157 MG/DL (ref 0–200)
CHOLEST SERPL-MCNC: 160 MG/DL (ref 0–200)
CLARITY UR: ABNORMAL
CLARITY UR: CLEAR
CLARITY UR: CLEAR
CLARITY, POC: CLEAR
CO2 SERPL-SCNC: 19.8 MMOL/L (ref 22–29)
CO2 SERPL-SCNC: 23 MMOL/L (ref 22–29)
CO2 SERPL-SCNC: 23.2 MMOL/L (ref 22–29)
CO2 SERPL-SCNC: 23.8 MMOL/L (ref 22–29)
CO2 SERPL-SCNC: 23.9 MMOL/L (ref 22–29)
CO2 SERPL-SCNC: 24.4 MMOL/L (ref 22–29)
CO2 SERPL-SCNC: 24.4 MMOL/L (ref 22–29)
CO2 SERPL-SCNC: 24.5 MMOL/L (ref 22–29)
CO2 SERPL-SCNC: 25.3 MMOL/L (ref 22–29)
CO2 SERPL-SCNC: 25.7 MMOL/L (ref 22–29)
CO2 SERPL-SCNC: 26.1 MMOL/L (ref 22–29)
CO2 SERPL-SCNC: 26.8 MMOL/L (ref 22–29)
CO2 SERPL-SCNC: 27.2 MMOL/L (ref 22–29)
CO2 SERPL-SCNC: 27.6 MMOL/L (ref 22–29)
CO2 SERPL-SCNC: 28.9 MMOL/L (ref 22–29)
CO2 SERPL-SCNC: 29 MMOL/L (ref 22–29)
CO2 SERPL-SCNC: 29.9 MMOL/L (ref 22–29)
COLOR UR: ABNORMAL
COLOR UR: YELLOW
CREAT BLD-MCNC: 0.64 MG/DL (ref 0.57–1)
CREAT BLD-MCNC: 0.78 MG/DL (ref 0.57–1)
CREAT BLD-MCNC: 0.88 MG/DL (ref 0.57–1)
CREAT BLD-MCNC: 0.92 MG/DL (ref 0.57–1)
CREAT BLD-MCNC: 0.93 MG/DL (ref 0.57–1)
CREAT BLD-MCNC: 0.94 MG/DL (ref 0.57–1)
CREAT BLD-MCNC: 0.98 MG/DL (ref 0.57–1)
CREAT BLD-MCNC: 0.98 MG/DL (ref 0.57–1)
CREAT BLD-MCNC: 1.02 MG/DL (ref 0.57–1)
CREAT BLD-MCNC: 1.07 MG/DL (ref 0.57–1)
CREAT BLD-MCNC: 1.19 MG/DL (ref 0.57–1)
CREAT BLD-MCNC: 1.23 MG/DL (ref 0.57–1)
CREAT BLD-MCNC: 1.41 MG/DL (ref 0.57–1)
CREAT BLD-MCNC: 1.64 MG/DL (ref 0.57–1)
CREAT SERPL-MCNC: 0.92 MG/DL (ref 0.57–1)
CREAT SERPL-MCNC: 1 MG/DL (ref 0.57–1)
CREAT SERPL-MCNC: 1.02 MG/DL (ref 0.57–1)
D-LACTATE SERPL-SCNC: 1.6 MMOL/L (ref 0.5–2)
D-LACTATE SERPL-SCNC: 3.7 MMOL/L (ref 0.5–2)
DEPRECATED RDW RBC AUTO: 44.1 FL (ref 37–54)
DEPRECATED RDW RBC AUTO: 47.4 FL (ref 37–54)
DEPRECATED RDW RBC AUTO: 49.2 FL (ref 37–54)
DEPRECATED RDW RBC AUTO: 49.3 FL (ref 37–54)
DEPRECATED RDW RBC AUTO: 49.4 FL (ref 37–54)
DEPRECATED RDW RBC AUTO: 49.9 FL (ref 37–54)
DEPRECATED RDW RBC AUTO: 51.2 FL (ref 37–54)
DEPRECATED RDW RBC AUTO: 51.2 FL (ref 37–54)
DEPRECATED RDW RBC AUTO: 51.4 FL (ref 37–54)
DEPRECATED RDW RBC AUTO: 51.7 FL (ref 37–54)
DEPRECATED RDW RBC AUTO: 52.1 FL (ref 37–54)
E/E' RATIO: 15
EOSINOPHIL # BLD AUTO: 0.01 10*3/MM3 (ref 0–0.7)
EOSINOPHIL # BLD AUTO: 0.03 10*3/MM3 (ref 0–0.7)
EOSINOPHIL # BLD AUTO: 0.06 10*3/MM3 (ref 0–0.7)
EOSINOPHIL # BLD AUTO: 0.08 10*3/MM3 (ref 0–0.7)
EOSINOPHIL # BLD AUTO: 0.09 10*3/MM3 (ref 0–0.7)
EOSINOPHIL # BLD AUTO: 0.09 10*3/MM3 (ref 0–0.7)
EOSINOPHIL # BLD AUTO: 0.1 10*3/MM3 (ref 0–0.7)
EOSINOPHIL # BLD AUTO: 0.11 10*3/MM3 (ref 0–0.7)
EOSINOPHIL # BLD AUTO: 0.12 10*3/MM3 (ref 0–0.7)
EOSINOPHIL # BLD AUTO: 0.13 10*3/MM3 (ref 0–0.7)
EOSINOPHIL NFR BLD AUTO: 0.1 % (ref 0.3–6.2)
EOSINOPHIL NFR BLD AUTO: 0.5 % (ref 0.3–6.2)
EOSINOPHIL NFR BLD AUTO: 0.8 % (ref 0.3–6.2)
EOSINOPHIL NFR BLD AUTO: 1.2 % (ref 0.3–6.2)
EOSINOPHIL NFR BLD AUTO: 1.2 % (ref 0.3–6.2)
EOSINOPHIL NFR BLD AUTO: 1.4 % (ref 0.3–6.2)
EOSINOPHIL NFR BLD AUTO: 1.5 % (ref 0.3–6.2)
EOSINOPHIL NFR BLD AUTO: 1.6 % (ref 0.3–6.2)
ERYTHROCYTE [DISTWIDTH] IN BLOOD BY AUTOMATED COUNT: 12.6 % (ref 11.7–13)
ERYTHROCYTE [DISTWIDTH] IN BLOOD BY AUTOMATED COUNT: 13.1 % (ref 11.7–13)
ERYTHROCYTE [DISTWIDTH] IN BLOOD BY AUTOMATED COUNT: 13.5 % (ref 11.7–13)
ERYTHROCYTE [DISTWIDTH] IN BLOOD BY AUTOMATED COUNT: 13.6 % (ref 11.7–13)
ERYTHROCYTE [DISTWIDTH] IN BLOOD BY AUTOMATED COUNT: 13.7 % (ref 11.7–13)
ERYTHROCYTE [DISTWIDTH] IN BLOOD BY AUTOMATED COUNT: 13.8 % (ref 11.7–13)
ERYTHROCYTE [DISTWIDTH] IN BLOOD BY AUTOMATED COUNT: 13.9 % (ref 11.7–13)
ERYTHROCYTE [DISTWIDTH] IN BLOOD BY AUTOMATED COUNT: 14 % (ref 11.7–13)
ERYTHROCYTE [DISTWIDTH] IN BLOOD BY AUTOMATED COUNT: 14.3 % (ref 11.7–13)
ERYTHROCYTE [DISTWIDTH] IN BLOOD BY AUTOMATED COUNT: 14.6 % (ref 11.7–13)
ERYTHROCYTE [DISTWIDTH] IN BLOOD BY AUTOMATED COUNT: 14.8 % (ref 11.7–13)
ETHANOL BLD-MCNC: <10 MG/DL (ref 0–10)
ETHANOL UR QL: <0.01 %
GFR SERPL CREATININE-BSD FRML MDRD: 29 ML/MIN/1.73
GFR SERPL CREATININE-BSD FRML MDRD: 35 ML/MIN/1.73
GFR SERPL CREATININE-BSD FRML MDRD: 41 ML/MIN/1.73
GFR SERPL CREATININE-BSD FRML MDRD: 43 ML/MIN/1.73
GFR SERPL CREATININE-BSD FRML MDRD: 48 ML/MIN/1.73
GFR SERPL CREATININE-BSD FRML MDRD: 51 ML/MIN/1.73
GFR SERPL CREATININE-BSD FRML MDRD: 54 ML/MIN/1.73
GFR SERPL CREATININE-BSD FRML MDRD: 54 ML/MIN/1.73
GFR SERPL CREATININE-BSD FRML MDRD: 56 ML/MIN/1.73
GFR SERPL CREATININE-BSD FRML MDRD: 57 ML/MIN/1.73
GFR SERPL CREATININE-BSD FRML MDRD: 57 ML/MIN/1.73
GFR SERPL CREATININE-BSD FRML MDRD: 61 ML/MIN/1.73
GFR SERPL CREATININE-BSD FRML MDRD: 70 ML/MIN/1.73
GFR SERPL CREATININE-BSD FRML MDRD: 87 ML/MIN/1.73
GFR SERPLBLD CREATININE-BSD FMLA CKD-EPI: 51 ML/MIN/1.73
GFR SERPLBLD CREATININE-BSD FMLA CKD-EPI: 62 ML/MIN/1.73
GLOBULIN SER CALC-MCNC: 2.5 GM/DL
GLOBULIN SER CALC-MCNC: 2.8 GM/DL
GLOBULIN UR ELPH-MCNC: 2.8 GM/DL
GLOBULIN UR ELPH-MCNC: 3.3 GM/DL
GLOBULIN UR ELPH-MCNC: 3.5 GM/DL
GLOBULIN UR ELPH-MCNC: 3.8 GM/DL
GLOBULIN UR ELPH-MCNC: 4.1 GM/DL
GLUCOSE BLD-MCNC: 100 MG/DL (ref 65–99)
GLUCOSE BLD-MCNC: 102 MG/DL (ref 65–99)
GLUCOSE BLD-MCNC: 104 MG/DL (ref 65–99)
GLUCOSE BLD-MCNC: 105 MG/DL (ref 65–99)
GLUCOSE BLD-MCNC: 106 MG/DL (ref 65–99)
GLUCOSE BLD-MCNC: 106 MG/DL (ref 65–99)
GLUCOSE BLD-MCNC: 111 MG/DL (ref 65–99)
GLUCOSE BLD-MCNC: 114 MG/DL (ref 65–99)
GLUCOSE BLD-MCNC: 126 MG/DL (ref 65–99)
GLUCOSE BLD-MCNC: 152 MG/DL (ref 65–99)
GLUCOSE BLD-MCNC: 81 MG/DL (ref 65–99)
GLUCOSE BLD-MCNC: 87 MG/DL (ref 65–99)
GLUCOSE BLD-MCNC: 97 MG/DL (ref 65–99)
GLUCOSE BLD-MCNC: 98 MG/DL (ref 65–99)
GLUCOSE BLDC GLUCOMTR-MCNC: 122 MG/DL (ref 70–130)
GLUCOSE BLDC GLUCOMTR-MCNC: 75 MG/DL (ref 70–130)
GLUCOSE SERPL-MCNC: 101 MG/DL (ref 65–99)
GLUCOSE SERPL-MCNC: 121 MG/DL (ref 65–99)
GLUCOSE SERPL-MCNC: 96 MG/DL (ref 65–99)
GLUCOSE UR STRIP-MCNC: NEGATIVE MG/DL
GRAM STN SPEC: ABNORMAL
HBA1C MFR BLD: 5 % (ref 4.8–5.6)
HBA1C MFR BLD: 5.51 % (ref 4.8–5.6)
HCT VFR BLD AUTO: 31.8 % (ref 35.6–45.5)
HCT VFR BLD AUTO: 33.4 % (ref 35.6–45.5)
HCT VFR BLD AUTO: 36.2 % (ref 35.6–45.5)
HCT VFR BLD AUTO: 38.3 % (ref 35.6–45.5)
HCT VFR BLD AUTO: 38.7 % (ref 35.6–45.5)
HCT VFR BLD AUTO: 39.3 % (ref 35.6–45.5)
HCT VFR BLD AUTO: 39.5 % (ref 35.6–45.5)
HCT VFR BLD AUTO: 40 % (ref 35.6–45.5)
HCT VFR BLD AUTO: 41 % (ref 35.6–45.5)
HCT VFR BLD AUTO: 41.4 % (ref 35.6–45.5)
HCT VFR BLD AUTO: 41.9 % (ref 35.6–45.5)
HCT VFR BLD AUTO: 42.9 % (ref 35.6–45.5)
HCT VFR BLD AUTO: 44.1 % (ref 35.6–45.5)
HDLC SERPL-MCNC: 35 MG/DL (ref 40–60)
HDLC SERPL-MCNC: 39 MG/DL (ref 40–60)
HDLC SERPL-MCNC: 50 MG/DL (ref 40–60)
HGB BLD-MCNC: 10.1 G/DL (ref 11.9–15.5)
HGB BLD-MCNC: 10.5 G/DL (ref 11.9–15.5)
HGB BLD-MCNC: 11.2 G/DL (ref 11.9–15.5)
HGB BLD-MCNC: 12.2 G/DL (ref 11.9–15.5)
HGB BLD-MCNC: 12.4 G/DL (ref 11.9–15.5)
HGB BLD-MCNC: 12.5 G/DL (ref 11.9–15.5)
HGB BLD-MCNC: 12.6 G/DL (ref 11.9–15.5)
HGB BLD-MCNC: 12.8 G/DL (ref 11.9–15.5)
HGB BLD-MCNC: 12.9 G/DL (ref 11.9–15.5)
HGB BLD-MCNC: 13.1 G/DL (ref 11.9–15.5)
HGB BLD-MCNC: 13.4 G/DL (ref 11.9–15.5)
HGB BLD-MCNC: 13.7 G/DL (ref 11.9–15.5)
HGB BLD-MCNC: 14.1 G/DL (ref 11.9–15.5)
HGB UR QL STRIP.AUTO: ABNORMAL
HGB UR QL STRIP.AUTO: NEGATIVE
HGB UR QL STRIP.AUTO: NEGATIVE
HOLD SPECIMEN: NORMAL
HYALINE CASTS UR QL AUTO: ABNORMAL /LPF
IMM GRANULOCYTES # BLD: 0 10*3/MM3 (ref 0–0.03)
IMM GRANULOCYTES # BLD: 0.01 10*3/MM3 (ref 0–0.03)
IMM GRANULOCYTES # BLD: 0.01 10*3/MM3 (ref 0–0.03)
IMM GRANULOCYTES # BLD: 0.02 10*3/MM3 (ref 0–0.03)
IMM GRANULOCYTES # BLD: 0.08 10*3/MM3 (ref 0–0.03)
IMM GRANULOCYTES # BLD: 0.09 10*3/MM3 (ref 0–0.03)
IMM GRANULOCYTES NFR BLD: 0 % (ref 0–0.5)
IMM GRANULOCYTES NFR BLD: 0.1 % (ref 0–0.5)
IMM GRANULOCYTES NFR BLD: 0.1 % (ref 0–0.5)
IMM GRANULOCYTES NFR BLD: 0.3 % (ref 0–0.5)
IMM GRANULOCYTES NFR BLD: 0.6 % (ref 0–0.5)
IMM GRANULOCYTES NFR BLD: 0.7 % (ref 0–0.5)
ISOLATED FROM: ABNORMAL
KETONES UR QL STRIP: ABNORMAL
KETONES UR QL STRIP: ABNORMAL
KETONES UR QL STRIP: NEGATIVE
KETONES UR QL: NEGATIVE
LDLC SERPL CALC-MCNC: 103 MG/DL (ref 0–100)
LDLC SERPL CALC-MCNC: 85 MG/DL (ref 0–100)
LDLC SERPL CALC-MCNC: 99 MG/DL (ref 0–100)
LDLC/HDLC SERPL: 2.65 {RATIO}
LDLC/HDLC SERPL: 2.84 {RATIO}
LEFT ATRIUM VOLUME INDEX: 47 ML/M2
LEUKOCYTE EST, POC: ABNORMAL
LEUKOCYTE ESTERASE UR QL STRIP.AUTO: ABNORMAL
LEUKOCYTE ESTERASE UR QL STRIP.AUTO: NEGATIVE
LIPASE SERPL-CCNC: 37 U/L (ref 13–60)
LV EF NUC BP: 28 %
LYMPHOCYTES # BLD AUTO: 0.92 10*3/MM3 (ref 0.9–4.8)
LYMPHOCYTES # BLD AUTO: 1.16 10*3/MM3 (ref 0.9–4.8)
LYMPHOCYTES # BLD AUTO: 1.31 10*3/MM3 (ref 0.9–4.8)
LYMPHOCYTES # BLD AUTO: 1.53 10*3/MM3 (ref 0.9–4.8)
LYMPHOCYTES # BLD AUTO: 1.56 10*3/MM3 (ref 0.9–4.8)
LYMPHOCYTES # BLD AUTO: 1.93 10*3/MM3 (ref 0.9–4.8)
LYMPHOCYTES # BLD AUTO: 2.13 10*3/MM3 (ref 0.9–4.8)
LYMPHOCYTES # BLD AUTO: 2.3 10*3/MM3 (ref 0.9–4.8)
LYMPHOCYTES # BLD AUTO: 2.6 10*3/MM3 (ref 0.9–4.8)
LYMPHOCYTES # BLD AUTO: 2.67 10*3/MM3 (ref 0.9–4.8)
LYMPHOCYTES # BLD AUTO: 2.75 10*3/MM3 (ref 0.9–4.8)
LYMPHOCYTES # BLD AUTO: 3.16 10*3/MM3 (ref 0.9–4.8)
LYMPHOCYTES NFR BLD AUTO: 12.1 % (ref 19.6–45.3)
LYMPHOCYTES NFR BLD AUTO: 24.3 % (ref 19.6–45.3)
LYMPHOCYTES NFR BLD AUTO: 24.6 % (ref 19.6–45.3)
LYMPHOCYTES NFR BLD AUTO: 24.9 % (ref 19.6–45.3)
LYMPHOCYTES NFR BLD AUTO: 32 % (ref 19.6–45.3)
LYMPHOCYTES NFR BLD AUTO: 32.5 % (ref 19.6–45.3)
LYMPHOCYTES NFR BLD AUTO: 33.1 % (ref 19.6–45.3)
LYMPHOCYTES NFR BLD AUTO: 33.8 % (ref 19.6–45.3)
LYMPHOCYTES NFR BLD AUTO: 34.7 % (ref 19.6–45.3)
LYMPHOCYTES NFR BLD AUTO: 42 % (ref 19.6–45.3)
LYMPHOCYTES NFR BLD AUTO: 9.2 % (ref 19.6–45.3)
LYMPHOCYTES NFR BLD AUTO: 9.9 % (ref 19.6–45.3)
MAGNESIUM SERPL-MCNC: 1.8 MG/DL (ref 1.6–2.4)
MAGNESIUM SERPL-MCNC: 1.9 MG/DL (ref 1.6–2.4)
MAXIMAL PREDICTED HEART RATE: 132 BPM
MAXIMAL PREDICTED HEART RATE: 132 BPM
MCH RBC QN AUTO: 29.7 PG (ref 26.9–32)
MCH RBC QN AUTO: 30.2 PG (ref 26.9–32)
MCH RBC QN AUTO: 30.4 PG (ref 26.9–32)
MCH RBC QN AUTO: 30.5 PG (ref 26.9–32)
MCH RBC QN AUTO: 30.7 PG (ref 26.9–32)
MCH RBC QN AUTO: 31 PG (ref 26.9–32)
MCH RBC QN AUTO: 31.3 PG (ref 26.9–32)
MCH RBC QN AUTO: 31.4 PG (ref 26.9–32)
MCH RBC QN AUTO: 31.6 PG (ref 26.9–32)
MCH RBC QN AUTO: 31.8 PG (ref 26.9–32)
MCH RBC QN AUTO: 31.9 PG (ref 26.9–32)
MCH RBC QN AUTO: 32.3 PG (ref 26.9–32)
MCH RBC QN AUTO: 32.4 PG (ref 26.9–32)
MCHC RBC AUTO-ENTMCNC: 30.1 G/DL (ref 32.4–36.3)
MCHC RBC AUTO-ENTMCNC: 30.9 G/DL (ref 32.4–36.3)
MCHC RBC AUTO-ENTMCNC: 31 G/DL (ref 32.4–36.3)
MCHC RBC AUTO-ENTMCNC: 31.1 G/DL (ref 32.4–36.3)
MCHC RBC AUTO-ENTMCNC: 31.4 G/DL (ref 32.4–36.3)
MCHC RBC AUTO-ENTMCNC: 31.5 G/DL (ref 32.4–36.3)
MCHC RBC AUTO-ENTMCNC: 31.6 G/DL (ref 32.4–36.3)
MCHC RBC AUTO-ENTMCNC: 31.8 G/DL (ref 32.4–36.3)
MCHC RBC AUTO-ENTMCNC: 32.3 G/DL (ref 32.4–36.3)
MCHC RBC AUTO-ENTMCNC: 32.4 G/DL (ref 32.4–36.3)
MCHC RBC AUTO-ENTMCNC: 32.4 G/DL (ref 32.4–36.3)
MCHC RBC AUTO-ENTMCNC: 32.7 G/DL (ref 32.4–36.3)
MCHC RBC AUTO-ENTMCNC: 33.7 G/DL (ref 32.4–36.3)
MCV RBC AUTO: 101.1 FL (ref 80.5–98.2)
MCV RBC AUTO: 101.9 FL (ref 80.5–98.2)
MCV RBC AUTO: 102.6 FL (ref 80.5–98.2)
MCV RBC AUTO: 94.9 FL (ref 80.5–98.2)
MCV RBC AUTO: 96.1 FL (ref 80.5–98.2)
MCV RBC AUTO: 96.5 FL (ref 80.5–98.2)
MCV RBC AUTO: 96.8 FL (ref 80.5–98.2)
MCV RBC AUTO: 97 FL (ref 80.5–98.2)
MCV RBC AUTO: 97.1 FL (ref 80.5–98.2)
MCV RBC AUTO: 97.6 FL (ref 80.5–98.2)
MCV RBC AUTO: 97.7 FL (ref 80.5–98.2)
MCV RBC AUTO: 98 FL (ref 80.5–98.2)
MCV RBC AUTO: 98.6 FL (ref 80.5–98.2)
MONOCYTES # BLD AUTO: 0.38 10*3/MM3 (ref 0.2–1.2)
MONOCYTES # BLD AUTO: 0.54 10*3/MM3 (ref 0.2–1.2)
MONOCYTES # BLD AUTO: 0.56 10*3/MM3 (ref 0.2–1.2)
MONOCYTES # BLD AUTO: 0.56 10*3/MM3 (ref 0.2–1.2)
MONOCYTES # BLD AUTO: 0.57 10*3/MM3 (ref 0.2–1.2)
MONOCYTES # BLD AUTO: 0.59 10*3/MM3 (ref 0.2–1.2)
MONOCYTES # BLD AUTO: 0.66 10*3/MM3 (ref 0.2–1.2)
MONOCYTES # BLD AUTO: 0.67 10*3/MM3 (ref 0.2–1.2)
MONOCYTES # BLD AUTO: 0.69 10*3/MM3 (ref 0.2–1.2)
MONOCYTES # BLD AUTO: 0.76 10*3/MM3 (ref 0.2–1.2)
MONOCYTES # BLD AUTO: 1.08 10*3/MM3 (ref 0.2–1.2)
MONOCYTES # BLD AUTO: 1.57 10*3/MM3 (ref 0.2–1.2)
MONOCYTES NFR BLD AUTO: 11.1 % (ref 5–12)
MONOCYTES NFR BLD AUTO: 12 % (ref 5–12)
MONOCYTES NFR BLD AUTO: 4.8 % (ref 5–12)
MONOCYTES NFR BLD AUTO: 6.7 % (ref 5–12)
MONOCYTES NFR BLD AUTO: 7.8 % (ref 5–12)
MONOCYTES NFR BLD AUTO: 7.8 % (ref 5–12)
MONOCYTES NFR BLD AUTO: 8.1 % (ref 5–12)
MONOCYTES NFR BLD AUTO: 8.7 % (ref 5–12)
MONOCYTES NFR BLD AUTO: 8.7 % (ref 5–12)
MONOCYTES NFR BLD AUTO: 9 % (ref 5–12)
MONOCYTES NFR BLD AUTO: 9.2 % (ref 5–12)
MONOCYTES NFR BLD AUTO: 9.4 % (ref 5–12)
NEUTROPHILS # BLD AUTO: 11.18 10*3/MM3 (ref 1.9–8.1)
NEUTROPHILS # BLD AUTO: 3.34 10*3/MM3 (ref 1.9–8.1)
NEUTROPHILS # BLD AUTO: 3.62 10*3/MM3 (ref 1.9–8.1)
NEUTROPHILS # BLD AUTO: 3.95 10*3/MM3 (ref 1.9–8.1)
NEUTROPHILS # BLD AUTO: 3.98 10*3/MM3 (ref 1.9–8.1)
NEUTROPHILS # BLD AUTO: 4.08 10*3/MM3 (ref 1.9–8.1)
NEUTROPHILS # BLD AUTO: 4.35 10*3/MM3 (ref 1.9–8.1)
NEUTROPHILS # BLD AUTO: 4.72 10*3/MM3 (ref 1.9–8.1)
NEUTROPHILS # BLD AUTO: 4.84 10*3/MM3 (ref 1.9–8.1)
NEUTROPHILS # BLD AUTO: 5.61 10*3/MM3 (ref 1.9–8.1)
NEUTROPHILS # BLD AUTO: 6.02 10*3/MM3 (ref 1.9–8.1)
NEUTROPHILS # BLD AUTO: 9.38 10*3/MM3 (ref 1.9–8.1)
NEUTROPHILS NFR BLD AUTO: 48.2 % (ref 42.7–76)
NEUTROPHILS NFR BLD AUTO: 55.1 % (ref 42.7–76)
NEUTROPHILS NFR BLD AUTO: 56.3 % (ref 42.7–76)
NEUTROPHILS NFR BLD AUTO: 57.2 % (ref 42.7–76)
NEUTROPHILS NFR BLD AUTO: 59.4 % (ref 42.7–76)
NEUTROPHILS NFR BLD AUTO: 59.6 % (ref 42.7–76)
NEUTROPHILS NFR BLD AUTO: 62.4 % (ref 42.7–76)
NEUTROPHILS NFR BLD AUTO: 64.8 % (ref 42.7–76)
NEUTROPHILS NFR BLD AUTO: 65.6 % (ref 42.7–76)
NEUTROPHILS NFR BLD AUTO: 78.9 % (ref 42.7–76)
NEUTROPHILS NFR BLD AUTO: 79 % (ref 42.7–76)
NEUTROPHILS NFR BLD AUTO: 80.1 % (ref 42.7–76)
NITRITE UR QL STRIP: NEGATIVE
NITRITE UR-MCNC: NEGATIVE MG/ML
NT-PROBNP SERPL-MCNC: 2043 PG/ML (ref 0–1800)
NT-PROBNP SERPL-MCNC: 2068 PG/ML (ref 0–1800)
NT-PROBNP SERPL-MCNC: 3314 PG/ML (ref 0–1800)
NT-PROBNP SERPL-MCNC: 5910 PG/ML (ref 0–1800)
NT-PROBNP SERPL-MCNC: 7057 PG/ML (ref 0–1800)
PERCENT MAX PREDICTED HR: 99.24 %
PH UR STRIP.AUTO: 6 [PH] (ref 5–8)
PH UR STRIP.AUTO: 6 [PH] (ref 5–8)
PH UR STRIP.AUTO: 6.5 [PH] (ref 5–8)
PH UR STRIP.AUTO: 6.5 [PH] (ref 5–8)
PH UR STRIP.AUTO: <=5 [PH] (ref 5–8)
PH UR: 6.5 [PH] (ref 5–8)
PLATELET # BLD AUTO: 105 10*3/MM3 (ref 140–500)
PLATELET # BLD AUTO: 107 10*3/MM3 (ref 140–500)
PLATELET # BLD AUTO: 145 10*3/MM3 (ref 140–500)
PLATELET # BLD AUTO: 149 10*3/MM3 (ref 140–500)
PLATELET # BLD AUTO: 161 10*3/MM3 (ref 140–500)
PLATELET # BLD AUTO: 168 10*3/MM3 (ref 140–500)
PLATELET # BLD AUTO: 189 10*3/MM3 (ref 140–500)
PLATELET # BLD AUTO: 198 10*3/MM3 (ref 140–500)
PLATELET # BLD AUTO: 203 10*3/MM3 (ref 140–500)
PLATELET # BLD AUTO: 203 10*3/MM3 (ref 140–500)
PLATELET # BLD AUTO: 212 10*3/MM3 (ref 140–500)
PLATELET # BLD AUTO: 228 10*3/MM3 (ref 140–500)
PLATELET # BLD AUTO: 258 10*3/MM3 (ref 140–500)
PMV BLD AUTO: 10.9 FL (ref 6–12)
PMV BLD AUTO: 11.1 FL (ref 6–12)
PMV BLD AUTO: 11.5 FL (ref 6–12)
PMV BLD AUTO: 11.8 FL (ref 6–12)
PMV BLD AUTO: 12 FL (ref 6–12)
PMV BLD AUTO: 12.1 FL (ref 6–12)
PMV BLD AUTO: 12.2 FL (ref 6–12)
PMV BLD AUTO: 12.2 FL (ref 6–12)
PMV BLD AUTO: 12.3 FL (ref 6–12)
PMV BLD AUTO: 12.6 FL (ref 6–12)
PMV BLD AUTO: 12.8 FL (ref 6–12)
POTASSIUM BLD-SCNC: 2.2 MMOL/L (ref 3.5–5.2)
POTASSIUM BLD-SCNC: 2.3 MMOL/L (ref 3.5–5.2)
POTASSIUM BLD-SCNC: 2.9 MMOL/L (ref 3.5–5.2)
POTASSIUM BLD-SCNC: 3.1 MMOL/L (ref 3.5–5.2)
POTASSIUM BLD-SCNC: 3.4 MMOL/L (ref 3.5–5.2)
POTASSIUM BLD-SCNC: 3.5 MMOL/L (ref 3.5–5.2)
POTASSIUM BLD-SCNC: 3.6 MMOL/L (ref 3.5–5.2)
POTASSIUM BLD-SCNC: 3.7 MMOL/L (ref 3.5–5.2)
POTASSIUM BLD-SCNC: 3.8 MMOL/L (ref 3.5–5.2)
POTASSIUM BLD-SCNC: 4.4 MMOL/L (ref 3.5–5.2)
POTASSIUM SERPL-SCNC: 3.5 MMOL/L (ref 3.5–5.2)
POTASSIUM SERPL-SCNC: 3.6 MMOL/L (ref 3.5–5.2)
POTASSIUM SERPL-SCNC: 3.6 MMOL/L (ref 3.5–5.2)
PROCALCITONIN SERPL-MCNC: 0.89 NG/ML (ref 0.1–0.25)
PROT SERPL-MCNC: 5.7 G/DL (ref 6–8.5)
PROT SERPL-MCNC: 6.4 G/DL (ref 6–8.5)
PROT SERPL-MCNC: 6.6 G/DL (ref 6–8.5)
PROT SERPL-MCNC: 6.7 G/DL (ref 6–8.5)
PROT SERPL-MCNC: 7.1 G/DL (ref 6–8.5)
PROT SERPL-MCNC: 7.3 G/DL (ref 6–8.5)
PROT SERPL-MCNC: 7.6 G/DL (ref 6–8.5)
PROT UR QL STRIP: ABNORMAL
PROT UR QL STRIP: NEGATIVE
PROT UR QL STRIP: NEGATIVE
PROT UR STRIP-MCNC: ABNORMAL MG/DL
RBC # BLD AUTO: 3.12 10*6/MM3 (ref 3.9–5.2)
RBC # BLD AUTO: 3.45 10*6/MM3 (ref 3.9–5.2)
RBC # BLD AUTO: 3.58 10*6/MM3 (ref 3.9–5.2)
RBC # BLD AUTO: 3.83 10*6/MM3 (ref 3.9–5.2)
RBC # BLD AUTO: 3.95 10*6/MM3 (ref 3.9–5.2)
RBC # BLD AUTO: 3.96 10*6/MM3 (ref 3.9–5.2)
RBC # BLD AUTO: 4.03 10*6/MM3 (ref 3.9–5.2)
RBC # BLD AUTO: 4.1 10*6/MM3 (ref 3.9–5.2)
RBC # BLD AUTO: 4.29 10*6/MM3 (ref 3.9–5.2)
RBC # BLD AUTO: 4.32 10*6/MM3 (ref 3.9–5.2)
RBC # BLD AUTO: 4.35 10*6/MM3 (ref 3.9–5.2)
RBC # BLD AUTO: 4.36 10*6/MM3 (ref 3.9–5.2)
RBC # BLD AUTO: 4.54 10*6/MM3 (ref 3.9–5.2)
RBC # UR STRIP: ABNORMAL /UL
RBC # UR: ABNORMAL /HPF
REF LAB TEST METHOD: ABNORMAL
SODIUM BLD-SCNC: 139 MMOL/L (ref 136–145)
SODIUM BLD-SCNC: 142 MMOL/L (ref 136–145)
SODIUM BLD-SCNC: 142 MMOL/L (ref 136–145)
SODIUM BLD-SCNC: 143 MMOL/L (ref 136–145)
SODIUM BLD-SCNC: 144 MMOL/L (ref 136–145)
SODIUM BLD-SCNC: 145 MMOL/L (ref 136–145)
SODIUM BLD-SCNC: 146 MMOL/L (ref 136–145)
SODIUM BLD-SCNC: 146 MMOL/L (ref 136–145)
SODIUM BLD-SCNC: 147 MMOL/L (ref 136–145)
SODIUM SERPL-SCNC: 141 MMOL/L (ref 136–145)
SODIUM SERPL-SCNC: 142 MMOL/L (ref 136–145)
SODIUM SERPL-SCNC: 146 MMOL/L (ref 136–145)
SP GR UR STRIP: 1.01 (ref 1–1.03)
SP GR UR STRIP: 1.01 (ref 1–1.03)
SP GR UR STRIP: 1.02 (ref 1–1.03)
SP GR UR STRIP: 1.02 (ref 1–1.03)
SP GR UR STRIP: 1.03 (ref 1–1.03)
SP GR UR: 1.02 (ref 1–1.03)
SQUAMOUS #/AREA URNS HPF: ABNORMAL /HPF
STREP GROUPING: ABNORMAL
STRESS BASELINE BP: NORMAL MMHG
STRESS BASELINE HR: 103 BPM
STRESS PERCENT HR: 117 %
STRESS POST PEAK BP: NORMAL MMHG
STRESS POST PEAK HR: 131 BPM
STRESS TARGET HR: 112 BPM
STRESS TARGET HR: 112 BPM
T3FREE SERPL-MCNC: 2.5 PG/ML (ref 2–4.4)
T3FREE SERPL-MCNC: 2.95 PG/ML (ref 2–4.4)
T4 FREE SERPL-MCNC: 1.43 NG/DL (ref 0.93–1.7)
T4 FREE SERPL-MCNC: 1.47 NG/DL (ref 0.93–1.7)
T4 FREE SERPL-MCNC: 1.53 NG/DL (ref 0.93–1.7)
TRANS CELLS #/AREA URNS HPF: ABNORMAL /HPF
TRIGL SERPL-MCNC: 113 MG/DL (ref 0–150)
TRIGL SERPL-MCNC: 88 MG/DL (ref 0–150)
TRIGL SERPL-MCNC: 94 MG/DL (ref 0–150)
TROPONIN T SERPL-MCNC: 0.01 NG/ML (ref 0–0.03)
TROPONIN T SERPL-MCNC: <0.01 NG/ML (ref 0–0.03)
TSH SERPL DL<=0.005 MIU/L-ACNC: 4.03 MIU/ML (ref 0.27–4.2)
TSH SERPL DL<=0.05 MIU/L-ACNC: 3.96 MIU/ML (ref 0.27–4.2)
TSH SERPL DL<=0.05 MIU/L-ACNC: 7.2 MIU/ML (ref 0.27–4.2)
UROBILINOGEN UR QL STRIP: ABNORMAL
UROBILINOGEN UR QL: NORMAL
VALPROATE SERPL-MCNC: 43 MCG/ML (ref 50–125)
VLDLC SERPL CALC-MCNC: 18.8 MG/DL (ref 5–40)
VLDLC SERPL-MCNC: 17.6 MG/DL (ref 5–40)
VLDLC SERPL-MCNC: 22.6 MG/DL (ref 5–40)
WBC # BLD AUTO: 6.95 10*3/MM3 (ref 4.5–10.7)
WBC # BLD AUTO: 8.55 10*3/MM3 (ref 4.5–10.7)
WBC NRBC COR # BLD: 11.71 10*3/MM3 (ref 4.5–10.7)
WBC NRBC COR # BLD: 14.17 10*3/MM3 (ref 4.5–10.7)
WBC NRBC COR # BLD: 5.94 10*3/MM3 (ref 4.5–10.7)
WBC NRBC COR # BLD: 6.3 10*3/MM3 (ref 4.5–10.7)
WBC NRBC COR # BLD: 6.33 10*3/MM3 (ref 4.5–10.7)
WBC NRBC COR # BLD: 7.41 10*3/MM3 (ref 4.5–10.7)
WBC NRBC COR # BLD: 7.52 10*3/MM3 (ref 4.5–10.7)
WBC NRBC COR # BLD: 7.62 10*3/MM3 (ref 4.5–10.7)
WBC NRBC COR # BLD: 7.9 10*3/MM3 (ref 4.5–10.7)
WBC NRBC COR # BLD: 7.93 10*3/MM3 (ref 4.5–10.7)
WBC NRBC COR # BLD: 8.12 10*3/MM3 (ref 4.5–10.7)
WBC UR QL AUTO: ABNORMAL /HPF
WHOLE BLOOD HOLD SPECIMEN: NORMAL

## 2018-01-01 PROCEDURE — 99285 EMERGENCY DEPT VISIT HI MDM: CPT

## 2018-01-01 PROCEDURE — 71045 X-RAY EXAM CHEST 1 VIEW: CPT

## 2018-01-01 PROCEDURE — 25010000002 MORPHINE PER 10 MG: Performed by: INTERNAL MEDICINE

## 2018-01-01 PROCEDURE — A9270 NON-COVERED ITEM OR SERVICE: HCPCS | Performed by: INTERNAL MEDICINE

## 2018-01-01 PROCEDURE — 83880 ASSAY OF NATRIURETIC PEPTIDE: CPT | Performed by: NURSE PRACTITIONER

## 2018-01-01 PROCEDURE — 63710000001 APIXABAN 5 MG TABLET: Performed by: NURSE PRACTITIONER

## 2018-01-01 PROCEDURE — A9270 NON-COVERED ITEM OR SERVICE: HCPCS | Performed by: NURSE PRACTITIONER

## 2018-01-01 PROCEDURE — 99214 OFFICE O/P EST MOD 30 MIN: CPT | Performed by: INTERNAL MEDICINE

## 2018-01-01 PROCEDURE — 99284 EMERGENCY DEPT VISIT MOD MDM: CPT

## 2018-01-01 PROCEDURE — 83880 ASSAY OF NATRIURETIC PEPTIDE: CPT | Performed by: EMERGENCY MEDICINE

## 2018-01-01 PROCEDURE — 80048 BASIC METABOLIC PNL TOTAL CA: CPT | Performed by: NURSE PRACTITIONER

## 2018-01-01 PROCEDURE — 87150 DNA/RNA AMPLIFIED PROBE: CPT | Performed by: PHYSICIAN ASSISTANT

## 2018-01-01 PROCEDURE — 84484 ASSAY OF TROPONIN QUANT: CPT | Performed by: NURSE PRACTITIONER

## 2018-01-01 PROCEDURE — 0 TECHNETIUM SESTAMIBI: Performed by: INTERNAL MEDICINE

## 2018-01-01 PROCEDURE — 25010000002 INFLUENZA VAC SUBUNIT QUAD 0.5 ML SUSPENSION PREFILLED SYRINGE: Performed by: INTERNAL MEDICINE

## 2018-01-01 PROCEDURE — 93454 CORONARY ARTERY ANGIO S&I: CPT | Performed by: INTERNAL MEDICINE

## 2018-01-01 PROCEDURE — 93010 ELECTROCARDIOGRAM REPORT: CPT | Performed by: INTERNAL MEDICINE

## 2018-01-01 PROCEDURE — 85025 COMPLETE CBC W/AUTO DIFF WBC: CPT | Performed by: NURSE PRACTITIONER

## 2018-01-01 PROCEDURE — 84484 ASSAY OF TROPONIN QUANT: CPT | Performed by: PHYSICIAN ASSISTANT

## 2018-01-01 PROCEDURE — 87147 CULTURE TYPE IMMUNOLOGIC: CPT | Performed by: HOSPITALIST

## 2018-01-01 PROCEDURE — 63710000001 ASPIRIN 81 MG TABLET DELAYED-RELEASE: Performed by: INTERNAL MEDICINE

## 2018-01-01 PROCEDURE — 25010000002 ENOXAPARIN PER 10 MG: Performed by: NURSE PRACTITIONER

## 2018-01-01 PROCEDURE — 80053 COMPREHEN METABOLIC PANEL: CPT | Performed by: INTERNAL MEDICINE

## 2018-01-01 PROCEDURE — 25010000003 POTASSIUM CHLORIDE 10 MEQ/100ML SOLUTION: Performed by: INTERNAL MEDICINE

## 2018-01-01 PROCEDURE — 85025 COMPLETE CBC W/AUTO DIFF WBC: CPT | Performed by: EMERGENCY MEDICINE

## 2018-01-01 PROCEDURE — 80048 BASIC METABOLIC PNL TOTAL CA: CPT | Performed by: HOSPITALIST

## 2018-01-01 PROCEDURE — 81001 URINALYSIS AUTO W/SCOPE: CPT | Performed by: NURSE PRACTITIONER

## 2018-01-01 PROCEDURE — 99153 MOD SED SAME PHYS/QHP EA: CPT | Performed by: INTERNAL MEDICINE

## 2018-01-01 PROCEDURE — 99152 MOD SED SAME PHYS/QHP 5/>YRS: CPT | Performed by: INTERNAL MEDICINE

## 2018-01-01 PROCEDURE — 25010000002 FUROSEMIDE PER 20 MG: Performed by: NURSE PRACTITIONER

## 2018-01-01 PROCEDURE — 84443 ASSAY THYROID STIM HORMONE: CPT | Performed by: PSYCHIATRY & NEUROLOGY

## 2018-01-01 PROCEDURE — 81003 URINALYSIS AUTO W/O SCOPE: CPT | Performed by: PHYSICIAN ASSISTANT

## 2018-01-01 PROCEDURE — 85027 COMPLETE CBC AUTOMATED: CPT | Performed by: INTERNAL MEDICINE

## 2018-01-01 PROCEDURE — 84481 FREE ASSAY (FT-3): CPT | Performed by: NURSE PRACTITIONER

## 2018-01-01 PROCEDURE — 25010000002 LORAZEPAM PER 2 MG: Performed by: INTERNAL MEDICINE

## 2018-01-01 PROCEDURE — 97166 OT EVAL MOD COMPLEX 45 MIN: CPT

## 2018-01-01 PROCEDURE — C1894 INTRO/SHEATH, NON-LASER: HCPCS | Performed by: INTERNAL MEDICINE

## 2018-01-01 PROCEDURE — 80053 COMPREHEN METABOLIC PANEL: CPT | Performed by: NURSE PRACTITIONER

## 2018-01-01 PROCEDURE — 99213 OFFICE O/P EST LOW 20 MIN: CPT | Performed by: PHYSICIAN ASSISTANT

## 2018-01-01 PROCEDURE — 73030 X-RAY EXAM OF SHOULDER: CPT | Performed by: PHYSICIAN ASSISTANT

## 2018-01-01 PROCEDURE — 83735 ASSAY OF MAGNESIUM: CPT | Performed by: NURSE PRACTITIONER

## 2018-01-01 PROCEDURE — 25010000002 MORPHINE (PF) 10 MG/ML SOLUTION: Performed by: INTERNAL MEDICINE

## 2018-01-01 PROCEDURE — G0378 HOSPITAL OBSERVATION PER HR: HCPCS

## 2018-01-01 PROCEDURE — 25010000002 DIGOXIN PER 500 MCG: Performed by: NURSE PRACTITIONER

## 2018-01-01 PROCEDURE — 74177 CT ABD & PELVIS W/CONTRAST: CPT

## 2018-01-01 PROCEDURE — 85025 COMPLETE CBC W/AUTO DIFF WBC: CPT | Performed by: HOSPITALIST

## 2018-01-01 PROCEDURE — 84132 ASSAY OF SERUM POTASSIUM: CPT | Performed by: INTERNAL MEDICINE

## 2018-01-01 PROCEDURE — 25010000002 FENTANYL CITRATE (PF) 100 MCG/2ML SOLUTION: Performed by: INTERNAL MEDICINE

## 2018-01-01 PROCEDURE — G0008 ADMIN INFLUENZA VIRUS VAC: HCPCS | Performed by: INTERNAL MEDICINE

## 2018-01-01 PROCEDURE — 83690 ASSAY OF LIPASE: CPT | Performed by: PHYSICIAN ASSISTANT

## 2018-01-01 PROCEDURE — 99221 1ST HOSP IP/OBS SF/LOW 40: CPT | Performed by: SURGERY

## 2018-01-01 PROCEDURE — P9612 CATHETERIZE FOR URINE SPEC: HCPCS

## 2018-01-01 PROCEDURE — 25010000002 CEFTRIAXONE PER 250 MG: Performed by: SPECIALIST

## 2018-01-01 PROCEDURE — 63710000001 FUROSEMIDE 20 MG TABLET: Performed by: INTERNAL MEDICINE

## 2018-01-01 PROCEDURE — 99221 1ST HOSP IP/OBS SF/LOW 40: CPT | Performed by: INTERNAL MEDICINE

## 2018-01-01 PROCEDURE — 99224 PR SBSQ OBSERVATION CARE/DAY 15 MINUTES: CPT | Performed by: NURSE PRACTITIONER

## 2018-01-01 PROCEDURE — 87076 CULTURE ANAEROBE IDENT EACH: CPT | Performed by: PHYSICIAN ASSISTANT

## 2018-01-01 PROCEDURE — 25010000002 VANCOMYCIN 10 G RECONSTITUTED SOLUTION: Performed by: PHYSICIAN ASSISTANT

## 2018-01-01 PROCEDURE — 83036 HEMOGLOBIN GLYCOSYLATED A1C: CPT | Performed by: INTERNAL MEDICINE

## 2018-01-01 PROCEDURE — 36415 COLL VENOUS BLD VENIPUNCTURE: CPT

## 2018-01-01 PROCEDURE — 84145 PROCALCITONIN (PCT): CPT | Performed by: PHYSICIAN ASSISTANT

## 2018-01-01 PROCEDURE — 81001 URINALYSIS AUTO W/SCOPE: CPT | Performed by: PHYSICIAN ASSISTANT

## 2018-01-01 PROCEDURE — 78452 HT MUSCLE IMAGE SPECT MULT: CPT

## 2018-01-01 PROCEDURE — 84439 ASSAY OF FREE THYROXINE: CPT | Performed by: PSYCHIATRY & NEUROLOGY

## 2018-01-01 PROCEDURE — C1769 GUIDE WIRE: HCPCS | Performed by: INTERNAL MEDICINE

## 2018-01-01 PROCEDURE — 63710000001 PANTOPRAZOLE 40 MG TABLET DELAYED-RELEASE: Performed by: INTERNAL MEDICINE

## 2018-01-01 PROCEDURE — 25010000002 REGADENOSON 0.4 MG/5ML SOLUTION: Performed by: INTERNAL MEDICINE

## 2018-01-01 PROCEDURE — 93306 TTE W/DOPPLER COMPLETE: CPT

## 2018-01-01 PROCEDURE — 87086 URINE CULTURE/COLONY COUNT: CPT | Performed by: HOSPITALIST

## 2018-01-01 PROCEDURE — 25010000002 DEXTROSE 5%-0.45%NS WITH KCL 10 MEQ/L SOLUTION: Performed by: HOSPITALIST

## 2018-01-01 PROCEDURE — 93005 ELECTROCARDIOGRAM TRACING: CPT | Performed by: NURSE PRACTITIONER

## 2018-01-01 PROCEDURE — 80048 BASIC METABOLIC PNL TOTAL CA: CPT | Performed by: INTERNAL MEDICINE

## 2018-01-01 PROCEDURE — A9500 TC99M SESTAMIBI: HCPCS | Performed by: INTERNAL MEDICINE

## 2018-01-01 PROCEDURE — 63710000001 CHOLECALCIFEROL 1000 UNITS TABLET: Performed by: INTERNAL MEDICINE

## 2018-01-01 PROCEDURE — 80053 COMPREHEN METABOLIC PANEL: CPT | Performed by: EMERGENCY MEDICINE

## 2018-01-01 PROCEDURE — 80053 COMPREHEN METABOLIC PANEL: CPT | Performed by: PHYSICIAN ASSISTANT

## 2018-01-01 PROCEDURE — 82962 GLUCOSE BLOOD TEST: CPT

## 2018-01-01 PROCEDURE — 25010000002 FUROSEMIDE PER 20 MG: Performed by: HOSPITALIST

## 2018-01-01 PROCEDURE — 25010000002 FUROSEMIDE PER 20 MG: Performed by: EMERGENCY MEDICINE

## 2018-01-01 PROCEDURE — 0 IOPAMIDOL PER 1 ML: Performed by: INTERNAL MEDICINE

## 2018-01-01 PROCEDURE — 85025 COMPLETE CBC W/AUTO DIFF WBC: CPT | Performed by: INTERNAL MEDICINE

## 2018-01-01 PROCEDURE — 97162 PT EVAL MOD COMPLEX 30 MIN: CPT

## 2018-01-01 PROCEDURE — 99232 SBSQ HOSP IP/OBS MODERATE 35: CPT | Performed by: INTERNAL MEDICINE

## 2018-01-01 PROCEDURE — 84443 ASSAY THYROID STIM HORMONE: CPT | Performed by: NURSE PRACTITIONER

## 2018-01-01 PROCEDURE — 25010000002 PIPERACILLIN SOD-TAZOBACTAM PER 1 G: Performed by: INTERNAL MEDICINE

## 2018-01-01 PROCEDURE — 25010000003 POTASSIUM CHLORIDE 10 MEQ/100ML SOLUTION: Performed by: PHYSICIAN ASSISTANT

## 2018-01-01 PROCEDURE — 70450 CT HEAD/BRAIN W/O DYE: CPT

## 2018-01-01 PROCEDURE — 99214 OFFICE O/P EST MOD 30 MIN: CPT | Performed by: PHYSICIAN ASSISTANT

## 2018-01-01 PROCEDURE — 25010000002 IOPAMIDOL 61 % SOLUTION: Performed by: EMERGENCY MEDICINE

## 2018-01-01 PROCEDURE — 97110 THERAPEUTIC EXERCISES: CPT

## 2018-01-01 PROCEDURE — 93005 ELECTROCARDIOGRAM TRACING: CPT | Performed by: PSYCHIATRY & NEUROLOGY

## 2018-01-01 PROCEDURE — 63710000001 AMLODIPINE 2.5 MG TABLET: Performed by: NURSE PRACTITIONER

## 2018-01-01 PROCEDURE — 92610 EVALUATE SWALLOWING FUNCTION: CPT

## 2018-01-01 PROCEDURE — 63710000001 DIGOXIN 125 MCG TABLET: Performed by: INTERNAL MEDICINE

## 2018-01-01 PROCEDURE — 63710000001 ATORVASTATIN 10 MG TABLET: Performed by: NURSE PRACTITIONER

## 2018-01-01 PROCEDURE — 78452 HT MUSCLE IMAGE SPECT MULT: CPT | Performed by: INTERNAL MEDICINE

## 2018-01-01 PROCEDURE — 93018 CV STRESS TEST I&R ONLY: CPT | Performed by: INTERNAL MEDICINE

## 2018-01-01 PROCEDURE — 80164 ASSAY DIPROPYLACETIC ACD TOT: CPT | Performed by: SPECIALIST

## 2018-01-01 PROCEDURE — 63710000001 METOPROLOL TARTRATE 50 MG TABLET: Performed by: INTERNAL MEDICINE

## 2018-01-01 PROCEDURE — 25010000002 CEFTRIAXONE PER 250 MG: Performed by: HOSPITALIST

## 2018-01-01 PROCEDURE — 71046 X-RAY EXAM CHEST 2 VIEWS: CPT

## 2018-01-01 PROCEDURE — 80307 DRUG TEST PRSMV CHEM ANLYZR: CPT | Performed by: EMERGENCY MEDICINE

## 2018-01-01 PROCEDURE — 25010000002 HEPARIN (PORCINE) PER 1000 UNITS: Performed by: INTERNAL MEDICINE

## 2018-01-01 PROCEDURE — 25010000002 CEFEPIME PER 500 MG: Performed by: PHYSICIAN ASSISTANT

## 2018-01-01 PROCEDURE — 93005 ELECTROCARDIOGRAM TRACING: CPT | Performed by: INTERNAL MEDICINE

## 2018-01-01 PROCEDURE — 80061 LIPID PANEL: CPT | Performed by: INTERNAL MEDICINE

## 2018-01-01 PROCEDURE — 81001 URINALYSIS AUTO W/SCOPE: CPT | Performed by: PSYCHIATRY & NEUROLOGY

## 2018-01-01 PROCEDURE — 87040 BLOOD CULTURE FOR BACTERIA: CPT | Performed by: PHYSICIAN ASSISTANT

## 2018-01-01 PROCEDURE — 84439 ASSAY OF FREE THYROXINE: CPT | Performed by: INTERNAL MEDICINE

## 2018-01-01 PROCEDURE — 80061 LIPID PANEL: CPT | Performed by: NURSE PRACTITIONER

## 2018-01-01 PROCEDURE — 81001 URINALYSIS AUTO W/SCOPE: CPT | Performed by: EMERGENCY MEDICINE

## 2018-01-01 PROCEDURE — 90661 CCIIV3 VAC ABX FR 0.5 ML IM: CPT | Performed by: INTERNAL MEDICINE

## 2018-01-01 PROCEDURE — 25010000002 MIDAZOLAM PER 1 MG: Performed by: INTERNAL MEDICINE

## 2018-01-01 PROCEDURE — 81001 URINALYSIS AUTO W/SCOPE: CPT | Performed by: INTERNAL MEDICINE

## 2018-01-01 PROCEDURE — 93017 CV STRESS TEST TRACING ONLY: CPT

## 2018-01-01 PROCEDURE — 85025 COMPLETE CBC W/AUTO DIFF WBC: CPT | Performed by: PHYSICIAN ASSISTANT

## 2018-01-01 PROCEDURE — 93970 EXTREMITY STUDY: CPT

## 2018-01-01 PROCEDURE — 83735 ASSAY OF MAGNESIUM: CPT | Performed by: PHYSICIAN ASSISTANT

## 2018-01-01 PROCEDURE — 93005 ELECTROCARDIOGRAM TRACING: CPT | Performed by: PHYSICIAN ASSISTANT

## 2018-01-01 PROCEDURE — 83605 ASSAY OF LACTIC ACID: CPT | Performed by: PHYSICIAN ASSISTANT

## 2018-01-01 PROCEDURE — 84484 ASSAY OF TROPONIN QUANT: CPT | Performed by: EMERGENCY MEDICINE

## 2018-01-01 PROCEDURE — 90791 PSYCH DIAGNOSTIC EVALUATION: CPT

## 2018-01-01 PROCEDURE — 82140 ASSAY OF AMMONIA: CPT | Performed by: PHYSICIAN ASSISTANT

## 2018-01-01 PROCEDURE — 93306 TTE W/DOPPLER COMPLETE: CPT | Performed by: INTERNAL MEDICINE

## 2018-01-01 PROCEDURE — 99217 PR OBSERVATION CARE DISCHARGE MANAGEMENT: CPT | Performed by: NURSE PRACTITIONER

## 2018-01-01 PROCEDURE — 99213 OFFICE O/P EST LOW 20 MIN: CPT | Performed by: INTERNAL MEDICINE

## 2018-01-01 PROCEDURE — 93005 ELECTROCARDIOGRAM TRACING: CPT | Performed by: EMERGENCY MEDICINE

## 2018-01-01 RX ORDER — PROMETHAZINE HYDROCHLORIDE 6.25 MG/5ML
6.25 SYRUP ORAL EVERY 4 HOURS PRN
Status: DISCONTINUED | OUTPATIENT
Start: 2018-01-01 | End: 2018-12-06 | Stop reason: HOSPADM

## 2018-01-01 RX ORDER — MAGNESIUM SULFATE 1 G/100ML
1 INJECTION INTRAVENOUS AS NEEDED
Status: DISCONTINUED | OUTPATIENT
Start: 2018-01-01 | End: 2018-01-01 | Stop reason: HOSPADM

## 2018-01-01 RX ORDER — MELATONIN
5000 DAILY
Status: DISCONTINUED | OUTPATIENT
Start: 2018-01-01 | End: 2018-01-01 | Stop reason: HOSPADM

## 2018-01-01 RX ORDER — POTASSIUM CHLORIDE 750 MG/1
40 CAPSULE, EXTENDED RELEASE ORAL AS NEEDED
Status: DISCONTINUED | OUTPATIENT
Start: 2018-01-01 | End: 2018-01-01 | Stop reason: SDUPTHER

## 2018-01-01 RX ORDER — AMLODIPINE BESYLATE 2.5 MG/1
TABLET ORAL
Qty: 30 TABLET | Refills: 0 | Status: SHIPPED | OUTPATIENT
Start: 2018-01-01 | End: 2018-01-01

## 2018-01-01 RX ORDER — PROMETHAZINE HYDROCHLORIDE 25 MG/ML
6.25 INJECTION, SOLUTION INTRAMUSCULAR; INTRAVENOUS EVERY 4 HOURS PRN
Status: DISCONTINUED | OUTPATIENT
Start: 2018-01-01 | End: 2018-01-01 | Stop reason: HOSPADM

## 2018-01-01 RX ORDER — SODIUM CHLORIDE 0.9 % (FLUSH) 0.9 %
1-10 SYRINGE (ML) INJECTION AS NEEDED
Status: DISCONTINUED | OUTPATIENT
Start: 2018-01-01 | End: 2018-01-01

## 2018-01-01 RX ORDER — POTASSIUM CHLORIDE 7.45 MG/ML
10 INJECTION INTRAVENOUS ONCE
Status: COMPLETED | OUTPATIENT
Start: 2018-01-01 | End: 2018-01-01

## 2018-01-01 RX ORDER — ATORVASTATIN CALCIUM 20 MG/1
20 TABLET, FILM COATED ORAL DAILY
Qty: 30 TABLET | Refills: 11 | Status: SHIPPED | OUTPATIENT
Start: 2018-01-01 | End: 2018-01-01 | Stop reason: SDUPTHER

## 2018-01-01 RX ORDER — POTASSIUM CHLORIDE 20MEQ/15ML
10 LIQUID (ML) ORAL DAILY
Qty: 240 ML | Refills: 5 | Status: SHIPPED | OUTPATIENT
Start: 2018-01-01

## 2018-01-01 RX ORDER — DIPHENHYDRAMINE HYDROCHLORIDE 50 MG/ML
25 INJECTION INTRAMUSCULAR; INTRAVENOUS EVERY 6 HOURS PRN
Status: DISCONTINUED | OUTPATIENT
Start: 2018-01-01 | End: 2018-01-01 | Stop reason: HOSPADM

## 2018-01-01 RX ORDER — METOPROLOL TARTRATE 50 MG/1
50 TABLET, FILM COATED ORAL EVERY 12 HOURS SCHEDULED
Qty: 60 TABLET | Refills: 0 | Status: SHIPPED | OUTPATIENT
Start: 2018-01-01 | End: 2018-01-01 | Stop reason: ALTCHOICE

## 2018-01-01 RX ORDER — OMEPRAZOLE 20 MG/1
20 CAPSULE, DELAYED RELEASE ORAL DAILY
COMMUNITY

## 2018-01-01 RX ORDER — LORAZEPAM 2 MG/ML
0.5 INJECTION INTRAMUSCULAR
Status: CANCELLED | OUTPATIENT
Start: 2018-01-01 | End: 2018-12-11

## 2018-01-01 RX ORDER — MORPHINE SULFATE 20 MG/ML
20 SOLUTION ORAL
Status: DISCONTINUED | OUTPATIENT
Start: 2018-01-01 | End: 2018-01-01 | Stop reason: HOSPADM

## 2018-01-01 RX ORDER — SODIUM CHLORIDE 9 MG/ML
250 INJECTION, SOLUTION INTRAVENOUS ONCE AS NEEDED
Status: DISCONTINUED | OUTPATIENT
Start: 2018-01-01 | End: 2018-01-01 | Stop reason: HOSPADM

## 2018-01-01 RX ORDER — FUROSEMIDE 20 MG/1
TABLET ORAL
Qty: 30 TABLET | Refills: 1 | Status: SHIPPED | OUTPATIENT
Start: 2018-01-01 | End: 2018-01-01 | Stop reason: SDUPTHER

## 2018-01-01 RX ORDER — MORPHINE SULFATE 20 MG/ML
20 SOLUTION ORAL
Status: CANCELLED | OUTPATIENT
Start: 2018-01-01 | End: 2018-12-11

## 2018-01-01 RX ORDER — LORAZEPAM 2 MG/ML
1 CONCENTRATE ORAL
Status: CANCELLED | OUTPATIENT
Start: 2018-01-01 | End: 2018-12-11

## 2018-01-01 RX ORDER — PROMETHAZINE HYDROCHLORIDE 6.25 MG/5ML
12.5 SYRUP ORAL EVERY 4 HOURS PRN
Status: CANCELLED | OUTPATIENT
Start: 2018-01-01

## 2018-01-01 RX ORDER — MIDAZOLAM HYDROCHLORIDE 1 MG/ML
INJECTION INTRAMUSCULAR; INTRAVENOUS AS NEEDED
Status: DISCONTINUED | OUTPATIENT
Start: 2018-01-01 | End: 2018-01-01 | Stop reason: HOSPADM

## 2018-01-01 RX ORDER — DIVALPROEX SODIUM 125 MG/1
250 CAPSULE, COATED PELLETS ORAL 2 TIMES DAILY
Status: DISCONTINUED | OUTPATIENT
Start: 2018-01-01 | End: 2018-01-01 | Stop reason: HOSPADM

## 2018-01-01 RX ORDER — GLYCOPYRROLATE 0.2 MG/ML
0.4 INJECTION INTRAMUSCULAR; INTRAVENOUS
Status: DISCONTINUED | OUTPATIENT
Start: 2018-01-01 | End: 2018-12-06 | Stop reason: HOSPADM

## 2018-01-01 RX ORDER — GLYCOPYRROLATE 0.2 MG/ML
0.2 INJECTION INTRAMUSCULAR; INTRAVENOUS
Status: DISCONTINUED | OUTPATIENT
Start: 2018-01-01 | End: 2018-01-01 | Stop reason: HOSPADM

## 2018-01-01 RX ORDER — SODIUM CHLORIDE 9 MG/ML
100 INJECTION, SOLUTION INTRAVENOUS CONTINUOUS
Status: DISCONTINUED | OUTPATIENT
Start: 2018-01-01 | End: 2018-01-01

## 2018-01-01 RX ORDER — DIGOXIN 125 MCG
125 TABLET ORAL
Qty: 30 TABLET | Refills: 0 | Status: SHIPPED | OUTPATIENT
Start: 2018-01-01 | End: 2018-01-01 | Stop reason: SINTOL

## 2018-01-01 RX ORDER — PROMETHAZINE HYDROCHLORIDE 6.25 MG/5ML
6.25 SYRUP ORAL EVERY 4 HOURS PRN
Status: CANCELLED | OUTPATIENT
Start: 2018-01-01

## 2018-01-01 RX ORDER — PROMETHAZINE HYDROCHLORIDE 25 MG/1
12.5 TABLET ORAL EVERY 4 HOURS PRN
Status: CANCELLED | OUTPATIENT
Start: 2018-01-01

## 2018-01-01 RX ORDER — ATORVASTATIN CALCIUM 10 MG/1
TABLET, FILM COATED ORAL
Qty: 30 TABLET | Refills: 5 | Status: SHIPPED | OUTPATIENT
Start: 2018-01-01 | End: 2018-01-01

## 2018-01-01 RX ORDER — ACETAMINOPHEN 160 MG/5ML
650 SOLUTION ORAL EVERY 4 HOURS PRN
Status: CANCELLED | OUTPATIENT
Start: 2018-01-01

## 2018-01-01 RX ORDER — POTASSIUM CHLORIDE 1.5 G/1.77G
40 POWDER, FOR SOLUTION ORAL AS NEEDED
Status: DISCONTINUED | OUTPATIENT
Start: 2018-01-01 | End: 2018-01-01 | Stop reason: HOSPADM

## 2018-01-01 RX ORDER — GLYCOPYRROLATE 0.2 MG/ML
0.2 INJECTION INTRAMUSCULAR; INTRAVENOUS
Status: DISCONTINUED | OUTPATIENT
Start: 2018-01-01 | End: 2018-12-06 | Stop reason: HOSPADM

## 2018-01-01 RX ORDER — METOPROLOL TARTRATE 50 MG/1
TABLET, FILM COATED ORAL
Qty: 60 TABLET | Refills: 0 | OUTPATIENT
Start: 2018-01-01

## 2018-01-01 RX ORDER — ATORVASTATIN CALCIUM 10 MG/1
10 TABLET, FILM COATED ORAL NIGHTLY
Qty: 30 TABLET | Refills: 1 | Status: SHIPPED | OUTPATIENT
Start: 2018-01-01 | End: 2018-01-01 | Stop reason: SDUPTHER

## 2018-01-01 RX ORDER — FUROSEMIDE 20 MG/1
TABLET ORAL
Qty: 30 TABLET | Refills: 3 | Status: SHIPPED | OUTPATIENT
Start: 2018-01-01 | End: 2018-01-01 | Stop reason: SDUPTHER

## 2018-01-01 RX ORDER — LORAZEPAM 2 MG/ML
0.5 CONCENTRATE ORAL
Status: DISCONTINUED | OUTPATIENT
Start: 2018-01-01 | End: 2018-12-06 | Stop reason: HOSPADM

## 2018-01-01 RX ORDER — PROMETHAZINE HYDROCHLORIDE 25 MG/1
6.25 TABLET ORAL EVERY 4 HOURS PRN
Status: DISCONTINUED | OUTPATIENT
Start: 2018-01-01 | End: 2018-01-01 | Stop reason: HOSPADM

## 2018-01-01 RX ORDER — FUROSEMIDE 20 MG/1
20 TABLET ORAL DAILY
Qty: 30 TABLET | Refills: 2 | Status: SHIPPED | OUTPATIENT
Start: 2018-01-01 | End: 2018-01-01 | Stop reason: SDUPTHER

## 2018-01-01 RX ORDER — MORPHINE SULFATE 10 MG/ML
6 INJECTION INTRAMUSCULAR; INTRAVENOUS; SUBCUTANEOUS
Status: CANCELLED | OUTPATIENT
Start: 2018-01-01 | End: 2018-12-11

## 2018-01-01 RX ORDER — DIVALPROEX SODIUM 125 MG/1
250 CAPSULE, COATED PELLETS ORAL 2 TIMES DAILY
Qty: 60 CAPSULE | Refills: 0 | Status: SHIPPED | OUTPATIENT
Start: 2018-01-01

## 2018-01-01 RX ORDER — APIXABAN 5 MG/1
TABLET, FILM COATED ORAL
Qty: 60 TABLET | Refills: 0 | Status: SHIPPED | OUTPATIENT
Start: 2018-01-01 | End: 2018-01-01 | Stop reason: SDUPTHER

## 2018-01-01 RX ORDER — ACETAMINOPHEN 160 MG/5ML
650 SOLUTION ORAL EVERY 4 HOURS PRN
Status: DISCONTINUED | OUTPATIENT
Start: 2018-01-01 | End: 2018-01-01 | Stop reason: HOSPADM

## 2018-01-01 RX ORDER — AMLODIPINE BESYLATE 2.5 MG/1
TABLET ORAL
Qty: 30 TABLET | Refills: 3 | Status: SHIPPED | OUTPATIENT
Start: 2018-01-01 | End: 2018-01-01

## 2018-01-01 RX ORDER — ACETAMINOPHEN 160 MG/5ML
650 SOLUTION ORAL EVERY 4 HOURS PRN
Status: DISCONTINUED | OUTPATIENT
Start: 2018-01-01 | End: 2018-12-06 | Stop reason: HOSPADM

## 2018-01-01 RX ORDER — DIPHENHYDRAMINE HCL 25 MG
25 CAPSULE ORAL EVERY 6 HOURS PRN
Status: DISCONTINUED | OUTPATIENT
Start: 2018-01-01 | End: 2018-01-01 | Stop reason: HOSPADM

## 2018-01-01 RX ORDER — LORAZEPAM 2 MG/ML
2 INJECTION INTRAMUSCULAR
Status: CANCELLED | OUTPATIENT
Start: 2018-01-01 | End: 2018-12-11

## 2018-01-01 RX ORDER — PROPRANOLOL HYDROCHLORIDE 20 MG/1
TABLET ORAL
Qty: 60 TABLET | Refills: 1 | Status: SHIPPED | OUTPATIENT
Start: 2018-01-01 | End: 2018-01-01

## 2018-01-01 RX ORDER — LIDOCAINE HYDROCHLORIDE 10 MG/ML
0.1 INJECTION, SOLUTION EPIDURAL; INFILTRATION; INTRACAUDAL; PERINEURAL ONCE AS NEEDED
Status: DISCONTINUED | OUTPATIENT
Start: 2018-01-01 | End: 2018-01-01

## 2018-01-01 RX ORDER — NITROGLYCERIN 0.4 MG/1
0.4 TABLET SUBLINGUAL
Status: DISCONTINUED | OUTPATIENT
Start: 2018-01-01 | End: 2018-01-01 | Stop reason: SDUPTHER

## 2018-01-01 RX ORDER — METOPROLOL TARTRATE 50 MG/1
50 TABLET, FILM COATED ORAL ONCE
Status: COMPLETED | OUTPATIENT
Start: 2018-01-01 | End: 2018-01-01

## 2018-01-01 RX ORDER — ACETAMINOPHEN 325 MG/1
650 TABLET ORAL EVERY 4 HOURS PRN
Status: DISCONTINUED | OUTPATIENT
Start: 2018-01-01 | End: 2018-01-01 | Stop reason: HOSPADM

## 2018-01-01 RX ORDER — LORAZEPAM 2 MG/ML
2 INJECTION INTRAMUSCULAR
Status: DISCONTINUED | OUTPATIENT
Start: 2018-01-01 | End: 2018-12-06 | Stop reason: HOSPADM

## 2018-01-01 RX ORDER — FAMOTIDINE 10 MG/ML
20 INJECTION, SOLUTION INTRAVENOUS ONCE
Status: DISCONTINUED | OUTPATIENT
Start: 2018-01-01 | End: 2018-01-01

## 2018-01-01 RX ORDER — ACETAMINOPHEN 325 MG/1
650 TABLET ORAL EVERY 4 HOURS PRN
Status: CANCELLED | OUTPATIENT
Start: 2018-01-01

## 2018-01-01 RX ORDER — ACETAMINOPHEN 650 MG/1
650 SUPPOSITORY RECTAL EVERY 4 HOURS PRN
Status: DISCONTINUED | OUTPATIENT
Start: 2018-01-01 | End: 2018-01-01 | Stop reason: HOSPADM

## 2018-01-01 RX ORDER — FUROSEMIDE 20 MG/1
20 TABLET ORAL DAILY
Status: DISCONTINUED | OUTPATIENT
Start: 2018-01-01 | End: 2018-01-01 | Stop reason: HOSPADM

## 2018-01-01 RX ORDER — TRAMADOL HYDROCHLORIDE 50 MG/1
50 TABLET ORAL EVERY 8 HOURS PRN
Status: DISCONTINUED | OUTPATIENT
Start: 2018-01-01 | End: 2018-01-01 | Stop reason: HOSPADM

## 2018-01-01 RX ORDER — DIGOXIN 125 MCG
125 TABLET ORAL
Status: DISCONTINUED | OUTPATIENT
Start: 2018-01-01 | End: 2018-01-01 | Stop reason: HOSPADM

## 2018-01-01 RX ORDER — LORAZEPAM 2 MG/ML
1 CONCENTRATE ORAL
Status: DISCONTINUED | OUTPATIENT
Start: 2018-01-01 | End: 2018-12-06 | Stop reason: HOSPADM

## 2018-01-01 RX ORDER — MORPHINE SULFATE 20 MG/ML
5 SOLUTION ORAL
Status: DISCONTINUED | OUTPATIENT
Start: 2018-01-01 | End: 2018-01-01 | Stop reason: HOSPADM

## 2018-01-01 RX ORDER — FUROSEMIDE 10 MG/ML
40 INJECTION INTRAMUSCULAR; INTRAVENOUS EVERY 8 HOURS
Status: DISCONTINUED | OUTPATIENT
Start: 2018-01-01 | End: 2018-01-01

## 2018-01-01 RX ORDER — MORPHINE SULFATE 2 MG/ML
2 INJECTION, SOLUTION INTRAMUSCULAR; INTRAVENOUS
Status: DISCONTINUED | OUTPATIENT
Start: 2018-01-01 | End: 2018-01-01 | Stop reason: HOSPADM

## 2018-01-01 RX ORDER — NITROGLYCERIN 0.4 MG/1
TABLET SUBLINGUAL
Qty: 25 TABLET | Refills: 11 | Status: SHIPPED | OUTPATIENT
Start: 2018-01-01 | End: 2018-01-01

## 2018-01-01 RX ORDER — METOPROLOL TARTRATE 50 MG/1
50 TABLET, FILM COATED ORAL EVERY 12 HOURS SCHEDULED
Status: DISCONTINUED | OUTPATIENT
Start: 2018-01-01 | End: 2018-01-01 | Stop reason: HOSPADM

## 2018-01-01 RX ORDER — PROMETHAZINE HYDROCHLORIDE 12.5 MG/1
12.5 SUPPOSITORY RECTAL EVERY 4 HOURS PRN
Status: DISCONTINUED | OUTPATIENT
Start: 2018-01-01 | End: 2018-12-06 | Stop reason: HOSPADM

## 2018-01-01 RX ORDER — PROMETHAZINE HYDROCHLORIDE 12.5 MG/1
6.25 SUPPOSITORY RECTAL EVERY 4 HOURS PRN
Status: DISCONTINUED | OUTPATIENT
Start: 2018-01-01 | End: 2018-01-01 | Stop reason: HOSPADM

## 2018-01-01 RX ORDER — LORAZEPAM 2 MG/ML
1 CONCENTRATE ORAL
Status: DISCONTINUED | OUTPATIENT
Start: 2018-01-01 | End: 2018-01-01 | Stop reason: HOSPADM

## 2018-01-01 RX ORDER — ACETAMINOPHEN 650 MG/1
650 SUPPOSITORY RECTAL EVERY 4 HOURS PRN
Status: CANCELLED | OUTPATIENT
Start: 2018-01-01

## 2018-01-01 RX ORDER — MORPHINE SULFATE 2 MG/ML
2 INJECTION, SOLUTION INTRAMUSCULAR; INTRAVENOUS
Status: DISCONTINUED | OUTPATIENT
Start: 2018-01-01 | End: 2018-12-06 | Stop reason: HOSPADM

## 2018-01-01 RX ORDER — AMLODIPINE BESYLATE 2.5 MG/1
2.5 TABLET ORAL DAILY
COMMUNITY

## 2018-01-01 RX ORDER — PROMETHAZINE HYDROCHLORIDE 12.5 MG/1
12.5 SUPPOSITORY RECTAL EVERY 4 HOURS PRN
Status: DISCONTINUED | OUTPATIENT
Start: 2018-01-01 | End: 2018-01-01 | Stop reason: HOSPADM

## 2018-01-01 RX ORDER — APIXABAN 5 MG/1
TABLET, FILM COATED ORAL
Qty: 60 TABLET | Refills: 1 | Status: SHIPPED | OUTPATIENT
Start: 2018-01-01 | End: 2018-01-01 | Stop reason: SDUPTHER

## 2018-01-01 RX ORDER — FUROSEMIDE 10 MG/ML
40 INJECTION INTRAMUSCULAR; INTRAVENOUS EVERY 12 HOURS
Status: DISCONTINUED | OUTPATIENT
Start: 2018-01-01 | End: 2018-01-01

## 2018-01-01 RX ORDER — DIPHENHYDRAMINE HCL 25 MG
25 CAPSULE ORAL EVERY 6 HOURS PRN
Status: CANCELLED | OUTPATIENT
Start: 2018-01-01

## 2018-01-01 RX ORDER — FUROSEMIDE 20 MG/1
TABLET ORAL
Qty: 30 TABLET | Refills: 0 | Status: SHIPPED | OUTPATIENT
Start: 2018-01-01 | End: 2018-01-01

## 2018-01-01 RX ORDER — LORAZEPAM 2 MG/ML
2 CONCENTRATE ORAL
Status: DISCONTINUED | OUTPATIENT
Start: 2018-01-01 | End: 2018-12-06 | Stop reason: HOSPADM

## 2018-01-01 RX ORDER — DONEPEZIL HYDROCHLORIDE 5 MG/1
5 TABLET, FILM COATED ORAL NIGHTLY
Status: DISCONTINUED | OUTPATIENT
Start: 2018-01-01 | End: 2018-01-01 | Stop reason: HOSPADM

## 2018-01-01 RX ORDER — DONEPEZIL HYDROCHLORIDE 5 MG/1
5 TABLET, FILM COATED ORAL NIGHTLY
Qty: 30 TABLET | Refills: 0 | Status: SHIPPED | OUTPATIENT
Start: 2018-01-01

## 2018-01-01 RX ORDER — DIGOXIN 0.25 MG/ML
250 INJECTION INTRAMUSCULAR; INTRAVENOUS ONCE
Status: COMPLETED | OUTPATIENT
Start: 2018-01-01 | End: 2018-01-01

## 2018-01-01 RX ORDER — MORPHINE SULFATE 2 MG/ML
2 INJECTION, SOLUTION INTRAMUSCULAR; INTRAVENOUS
Status: CANCELLED | OUTPATIENT
Start: 2018-01-01 | End: 2018-12-14

## 2018-01-01 RX ORDER — ASPIRIN 81 MG/1
81 TABLET ORAL DAILY
Status: DISCONTINUED | OUTPATIENT
Start: 2018-01-01 | End: 2018-01-01 | Stop reason: HOSPADM

## 2018-01-01 RX ORDER — SODIUM CHLORIDE 0.9 % (FLUSH) 0.9 %
3-10 SYRINGE (ML) INJECTION AS NEEDED
Status: CANCELLED | OUTPATIENT
Start: 2018-01-01

## 2018-01-01 RX ORDER — MORPHINE SULFATE 20 MG/ML
20 SOLUTION ORAL
Status: DISCONTINUED | OUTPATIENT
Start: 2018-01-01 | End: 2018-12-06 | Stop reason: HOSPADM

## 2018-01-01 RX ORDER — DIVALPROEX SODIUM 125 MG/1
250 CAPSULE, COATED PELLETS ORAL 2 TIMES DAILY
Status: DISCONTINUED | OUTPATIENT
Start: 2018-01-01 | End: 2018-12-06 | Stop reason: HOSPADM

## 2018-01-01 RX ORDER — GLYCOPYRROLATE 0.2 MG/ML
0.4 INJECTION INTRAMUSCULAR; INTRAVENOUS
Status: DISCONTINUED | OUTPATIENT
Start: 2018-01-01 | End: 2018-01-01 | Stop reason: HOSPADM

## 2018-01-01 RX ORDER — LORAZEPAM 2 MG/ML
1 INJECTION INTRAMUSCULAR
Status: CANCELLED | OUTPATIENT
Start: 2018-01-01 | End: 2018-12-11

## 2018-01-01 RX ORDER — SODIUM CHLORIDE 0.9 % (FLUSH) 0.9 %
10 SYRINGE (ML) INJECTION AS NEEDED
Status: DISCONTINUED | OUTPATIENT
Start: 2018-01-01 | End: 2018-01-01 | Stop reason: HOSPADM

## 2018-01-01 RX ORDER — PROMETHAZINE HYDROCHLORIDE 12.5 MG/1
6.25 SUPPOSITORY RECTAL EVERY 4 HOURS PRN
Status: DISCONTINUED | OUTPATIENT
Start: 2018-01-01 | End: 2018-12-06 | Stop reason: HOSPADM

## 2018-01-01 RX ORDER — DIPHENOXYLATE HYDROCHLORIDE AND ATROPINE SULFATE 2.5; .025 MG/1; MG/1
1 TABLET ORAL
Status: DISCONTINUED | OUTPATIENT
Start: 2018-01-01 | End: 2018-12-06 | Stop reason: HOSPADM

## 2018-01-01 RX ORDER — ONDANSETRON 4 MG/1
4 TABLET, FILM COATED ORAL EVERY 8 HOURS PRN
Status: DISCONTINUED | OUTPATIENT
Start: 2018-01-01 | End: 2018-01-01 | Stop reason: HOSPADM

## 2018-01-01 RX ORDER — GLYCOPYRROLATE 0.2 MG/ML
0.2 INJECTION INTRAMUSCULAR; INTRAVENOUS
Status: CANCELLED | OUTPATIENT
Start: 2018-01-01

## 2018-01-01 RX ORDER — POTASSIUM CHLORIDE 1.5 G/1.77G
40 POWDER, FOR SOLUTION ORAL AS NEEDED
Status: DISCONTINUED | OUTPATIENT
Start: 2018-01-01 | End: 2018-01-01

## 2018-01-01 RX ORDER — FUROSEMIDE 40 MG/1
40 TABLET ORAL DAILY
Status: DISCONTINUED | OUTPATIENT
Start: 2018-01-01 | End: 2018-01-01 | Stop reason: HOSPADM

## 2018-01-01 RX ORDER — DIPHENOXYLATE HYDROCHLORIDE AND ATROPINE SULFATE 2.5; .025 MG/1; MG/1
1 TABLET ORAL
Status: DISCONTINUED | OUTPATIENT
Start: 2018-01-01 | End: 2018-01-01 | Stop reason: HOSPADM

## 2018-01-01 RX ORDER — METOPROLOL SUCCINATE 25 MG/1
25 TABLET, EXTENDED RELEASE ORAL DAILY
Qty: 30 TABLET | Refills: 3 | Status: SHIPPED | OUTPATIENT
Start: 2018-01-01 | End: 2018-01-01 | Stop reason: ALTCHOICE

## 2018-01-01 RX ORDER — POTASSIUM CHLORIDE 7.45 MG/ML
10 INJECTION INTRAVENOUS
Status: DISCONTINUED | OUTPATIENT
Start: 2018-01-01 | End: 2018-01-01

## 2018-01-01 RX ORDER — AMLODIPINE BESYLATE 2.5 MG/1
2.5 TABLET ORAL
Qty: 30 TABLET | Refills: 2 | Status: SHIPPED | OUTPATIENT
Start: 2018-01-01 | End: 2018-01-01 | Stop reason: SDUPTHER

## 2018-01-01 RX ORDER — ONDANSETRON 4 MG/1
4 TABLET, FILM COATED ORAL EVERY 6 HOURS PRN
Status: DISCONTINUED | OUTPATIENT
Start: 2018-01-01 | End: 2018-01-01 | Stop reason: HOSPADM

## 2018-01-01 RX ORDER — LORAZEPAM 2 MG/ML
2 CONCENTRATE ORAL
Status: CANCELLED | OUTPATIENT
Start: 2018-01-01 | End: 2018-12-11

## 2018-01-01 RX ORDER — OLANZAPINE 5 MG/1
5 TABLET ORAL NIGHTLY
Status: DISCONTINUED | OUTPATIENT
Start: 2018-01-01 | End: 2018-01-01 | Stop reason: HOSPADM

## 2018-01-01 RX ORDER — PROPRANOLOL HYDROCHLORIDE 20 MG/1
20 TABLET ORAL 2 TIMES DAILY
Status: DISCONTINUED | OUTPATIENT
Start: 2018-01-01 | End: 2018-01-01 | Stop reason: HOSPADM

## 2018-01-01 RX ORDER — MORPHINE SULFATE 2 MG/ML
4 INJECTION, SOLUTION INTRAMUSCULAR; INTRAVENOUS
Status: DISCONTINUED | OUTPATIENT
Start: 2018-01-01 | End: 2018-12-06 | Stop reason: HOSPADM

## 2018-01-01 RX ORDER — PROMETHAZINE HYDROCHLORIDE 6.25 MG/5ML
12.5 SYRUP ORAL EVERY 4 HOURS PRN
Status: DISCONTINUED | OUTPATIENT
Start: 2018-01-01 | End: 2018-12-06 | Stop reason: HOSPADM

## 2018-01-01 RX ORDER — MORPHINE SULFATE 2 MG/ML
4 INJECTION, SOLUTION INTRAMUSCULAR; INTRAVENOUS
Status: CANCELLED | OUTPATIENT
Start: 2018-01-01 | End: 2018-12-11

## 2018-01-01 RX ORDER — PROPRANOLOL HYDROCHLORIDE 20 MG/1
20 TABLET ORAL 2 TIMES DAILY
Qty: 60 TABLET | Refills: 3 | Status: SHIPPED | OUTPATIENT
Start: 2018-01-01 | End: 2018-01-01 | Stop reason: SDUPTHER

## 2018-01-01 RX ORDER — POTASSIUM CHLORIDE 750 MG/1
10 TABLET, FILM COATED, EXTENDED RELEASE ORAL DAILY
Qty: 90 TABLET | Refills: 1 | Status: SHIPPED | OUTPATIENT
Start: 2018-01-01 | End: 2018-01-01

## 2018-01-01 RX ORDER — SODIUM CHLORIDE 0.9 % (FLUSH) 0.9 %
10 SYRINGE (ML) INJECTION AS NEEDED
Status: CANCELLED | OUTPATIENT
Start: 2018-01-01

## 2018-01-01 RX ORDER — AMLODIPINE BESYLATE 2.5 MG/1
TABLET ORAL
Qty: 30 TABLET | Refills: 1 | Status: SHIPPED | OUTPATIENT
Start: 2018-01-01 | End: 2018-01-01 | Stop reason: SDUPTHER

## 2018-01-01 RX ORDER — ASPIRIN 81 MG/1
81 TABLET ORAL DAILY
Status: DISCONTINUED | OUTPATIENT
Start: 2018-01-01 | End: 2018-01-01

## 2018-01-01 RX ORDER — POTASSIUM CHLORIDE 7.45 MG/ML
10 INJECTION INTRAVENOUS
Status: DISCONTINUED | OUTPATIENT
Start: 2018-01-01 | End: 2018-01-01 | Stop reason: SDUPTHER

## 2018-01-01 RX ORDER — MAGNESIUM SULFATE HEPTAHYDRATE 40 MG/ML
2 INJECTION, SOLUTION INTRAVENOUS AS NEEDED
Status: DISCONTINUED | OUTPATIENT
Start: 2018-01-01 | End: 2018-01-01 | Stop reason: HOSPADM

## 2018-01-01 RX ORDER — MORPHINE SULFATE 20 MG/ML
5 SOLUTION ORAL
Status: DISCONTINUED | OUTPATIENT
Start: 2018-01-01 | End: 2018-12-06 | Stop reason: HOSPADM

## 2018-01-01 RX ORDER — SCOLOPAMINE TRANSDERMAL SYSTEM 1 MG/1
1 PATCH, EXTENDED RELEASE TRANSDERMAL
Status: DISCONTINUED | OUTPATIENT
Start: 2018-01-01 | End: 2018-12-06 | Stop reason: HOSPADM

## 2018-01-01 RX ORDER — PROMETHAZINE HYDROCHLORIDE 25 MG/1
6.25 TABLET ORAL EVERY 4 HOURS PRN
Status: DISCONTINUED | OUTPATIENT
Start: 2018-01-01 | End: 2018-12-06 | Stop reason: HOSPADM

## 2018-01-01 RX ORDER — OLANZAPINE 10 MG/1
10 TABLET, ORALLY DISINTEGRATING ORAL ONCE
Status: COMPLETED | OUTPATIENT
Start: 2018-01-01 | End: 2018-01-01

## 2018-01-01 RX ORDER — OLANZAPINE 10 MG/1
5 INJECTION, POWDER, LYOPHILIZED, FOR SOLUTION INTRAMUSCULAR EVERY 8 HOURS PRN
Status: DISCONTINUED | OUTPATIENT
Start: 2018-01-01 | End: 2018-01-01 | Stop reason: HOSPADM

## 2018-01-01 RX ORDER — PROPRANOLOL HYDROCHLORIDE 20 MG/1
20 TABLET ORAL EVERY 12 HOURS
Status: DISCONTINUED | OUTPATIENT
Start: 2018-01-01 | End: 2018-01-01 | Stop reason: SDUPTHER

## 2018-01-01 RX ORDER — MORPHINE SULFATE 20 MG/ML
5 SOLUTION ORAL
Status: CANCELLED | OUTPATIENT
Start: 2018-01-01 | End: 2018-12-14

## 2018-01-01 RX ORDER — PROMETHAZINE HYDROCHLORIDE 12.5 MG/1
12.5 SUPPOSITORY RECTAL EVERY 4 HOURS PRN
Status: CANCELLED | OUTPATIENT
Start: 2018-01-01

## 2018-01-01 RX ORDER — AMLODIPINE BESYLATE 2.5 MG/1
2.5 TABLET ORAL DAILY
Status: DISCONTINUED | OUTPATIENT
Start: 2018-01-01 | End: 2018-01-01

## 2018-01-01 RX ORDER — ACETAMINOPHEN 325 MG/1
650 TABLET ORAL EVERY 6 HOURS PRN
Status: DISCONTINUED | OUTPATIENT
Start: 2018-01-01 | End: 2018-01-01 | Stop reason: HOSPADM

## 2018-01-01 RX ORDER — ONDANSETRON 4 MG/1
4 TABLET, ORALLY DISINTEGRATING ORAL EVERY 6 HOURS PRN
Status: DISCONTINUED | OUTPATIENT
Start: 2018-01-01 | End: 2018-01-01 | Stop reason: HOSPADM

## 2018-01-01 RX ORDER — DONEPEZIL HYDROCHLORIDE 5 MG/1
5 TABLET, FILM COATED ORAL NIGHTLY
Status: DISCONTINUED | OUTPATIENT
Start: 2018-01-01 | End: 2018-12-06 | Stop reason: HOSPADM

## 2018-01-01 RX ORDER — PROMETHAZINE HYDROCHLORIDE 25 MG/ML
6.25 INJECTION, SOLUTION INTRAMUSCULAR; INTRAVENOUS EVERY 4 HOURS PRN
Status: CANCELLED | OUTPATIENT
Start: 2018-01-01

## 2018-01-01 RX ORDER — LORAZEPAM 2 MG/ML
1 INJECTION INTRAMUSCULAR
Status: DISCONTINUED | OUTPATIENT
Start: 2018-01-01 | End: 2018-01-01 | Stop reason: HOSPADM

## 2018-01-01 RX ORDER — AMLODIPINE BESYLATE 2.5 MG/1
2.5 TABLET ORAL
Status: DISCONTINUED | OUTPATIENT
Start: 2018-01-01 | End: 2018-01-01 | Stop reason: HOSPADM

## 2018-01-01 RX ORDER — DIVALPROEX SODIUM 125 MG/1
125 CAPSULE, COATED PELLETS ORAL 3 TIMES DAILY
Status: DISCONTINUED | OUTPATIENT
Start: 2018-01-01 | End: 2018-01-01

## 2018-01-01 RX ORDER — LORAZEPAM 2 MG/ML
2 CONCENTRATE ORAL
Status: DISCONTINUED | OUTPATIENT
Start: 2018-01-01 | End: 2018-01-01 | Stop reason: HOSPADM

## 2018-01-01 RX ORDER — DIVALPROEX SODIUM 125 MG/1
250 CAPSULE, COATED PELLETS ORAL 2 TIMES DAILY
Status: CANCELLED | OUTPATIENT
Start: 2018-01-01

## 2018-01-01 RX ORDER — DONEPEZIL HYDROCHLORIDE 5 MG/1
5 TABLET, FILM COATED ORAL NIGHTLY
Status: CANCELLED | OUTPATIENT
Start: 2018-01-01

## 2018-01-01 RX ORDER — LORAZEPAM 2 MG/ML
0.5 CONCENTRATE ORAL
Status: CANCELLED | OUTPATIENT
Start: 2018-01-01 | End: 2018-12-11

## 2018-01-01 RX ORDER — FUROSEMIDE 20 MG/1
20 TABLET ORAL DAILY
Status: DISCONTINUED | OUTPATIENT
Start: 2018-01-01 | End: 2018-01-01

## 2018-01-01 RX ORDER — PROMETHAZINE HYDROCHLORIDE 6.25 MG/5ML
6.25 SYRUP ORAL EVERY 4 HOURS PRN
Status: DISCONTINUED | OUTPATIENT
Start: 2018-01-01 | End: 2018-01-01 | Stop reason: HOSPADM

## 2018-01-01 RX ORDER — PANTOPRAZOLE SODIUM 40 MG/1
40 TABLET, DELAYED RELEASE ORAL EVERY MORNING
Status: DISCONTINUED | OUTPATIENT
Start: 2018-01-01 | End: 2018-01-01 | Stop reason: HOSPADM

## 2018-01-01 RX ORDER — DIPHENHYDRAMINE HYDROCHLORIDE 50 MG/ML
25 INJECTION INTRAMUSCULAR; INTRAVENOUS EVERY 6 HOURS PRN
Status: CANCELLED | OUTPATIENT
Start: 2018-01-01

## 2018-01-01 RX ORDER — POTASSIUM CHLORIDE 7.45 MG/ML
10 INJECTION INTRAVENOUS
Status: DISCONTINUED | OUTPATIENT
Start: 2018-01-01 | End: 2018-01-01 | Stop reason: HOSPADM

## 2018-01-01 RX ORDER — NITROGLYCERIN 0.4 MG/1
0.4 TABLET SUBLINGUAL
COMMUNITY

## 2018-01-01 RX ORDER — SCOLOPAMINE TRANSDERMAL SYSTEM 1 MG/1
1 PATCH, EXTENDED RELEASE TRANSDERMAL
Status: CANCELLED | OUTPATIENT
Start: 2018-01-01

## 2018-01-01 RX ORDER — HYDROXYZINE PAMOATE 25 MG/1
50 CAPSULE ORAL EVERY 4 HOURS PRN
Status: COMPLETED | OUTPATIENT
Start: 2018-01-01 | End: 2018-01-01

## 2018-01-01 RX ORDER — DIPHENHYDRAMINE HCL 25 MG
25 CAPSULE ORAL EVERY 6 HOURS PRN
Status: DISCONTINUED | OUTPATIENT
Start: 2018-01-01 | End: 2018-12-06 | Stop reason: HOSPADM

## 2018-01-01 RX ORDER — OLANZAPINE 2.5 MG/1
2.5 TABLET ORAL DAILY
Qty: 90 TABLET | Refills: 0 | Status: SHIPPED | OUTPATIENT
Start: 2018-01-01

## 2018-01-01 RX ORDER — LORAZEPAM 2 MG/ML
1 INJECTION INTRAMUSCULAR
Status: DISCONTINUED | OUTPATIENT
Start: 2018-01-01 | End: 2018-12-06 | Stop reason: HOSPADM

## 2018-01-01 RX ORDER — PROMETHAZINE HYDROCHLORIDE 25 MG/ML
12.5 INJECTION, SOLUTION INTRAMUSCULAR; INTRAVENOUS EVERY 4 HOURS PRN
Status: DISCONTINUED | OUTPATIENT
Start: 2018-01-01 | End: 2018-12-06 | Stop reason: HOSPADM

## 2018-01-01 RX ORDER — TRAMADOL HYDROCHLORIDE 50 MG/1
50 TABLET ORAL EVERY 8 HOURS PRN
Qty: 90 TABLET | Refills: 1 | Status: SHIPPED | OUTPATIENT
Start: 2018-01-01 | End: 2018-01-01

## 2018-01-01 RX ORDER — NOREPINEPHRINE BIT/0.9 % NACL 8 MG/250ML
.02-.3 INFUSION BOTTLE (ML) INTRAVENOUS
Status: DISCONTINUED | OUTPATIENT
Start: 2018-01-01 | End: 2018-01-01

## 2018-01-01 RX ORDER — OMEPRAZOLE 20 MG/1
CAPSULE, DELAYED RELEASE ORAL
Qty: 30 CAPSULE | Refills: 10 | Status: SHIPPED | OUTPATIENT
Start: 2018-01-01 | End: 2018-01-01

## 2018-01-01 RX ORDER — POTASSIUM CHLORIDE 1.5 G/1.77G
40 POWDER, FOR SOLUTION ORAL AS NEEDED
Status: DISCONTINUED | OUTPATIENT
Start: 2018-01-01 | End: 2018-01-01 | Stop reason: SDUPTHER

## 2018-01-01 RX ORDER — ASPIRIN 81 MG/1
81 TABLET ORAL DAILY
Qty: 30 TABLET | Refills: 0 | Status: SHIPPED | OUTPATIENT
Start: 2018-01-01 | End: 2018-01-01 | Stop reason: HOSPADM

## 2018-01-01 RX ORDER — PROMETHAZINE HYDROCHLORIDE 25 MG/ML
6.25 INJECTION, SOLUTION INTRAMUSCULAR; INTRAVENOUS EVERY 4 HOURS PRN
Status: DISCONTINUED | OUTPATIENT
Start: 2018-01-01 | End: 2018-12-06 | Stop reason: HOSPADM

## 2018-01-01 RX ORDER — SODIUM CHLORIDE, SODIUM LACTATE, POTASSIUM CHLORIDE, CALCIUM CHLORIDE 600; 310; 30; 20 MG/100ML; MG/100ML; MG/100ML; MG/100ML
125 INJECTION, SOLUTION INTRAVENOUS CONTINUOUS
Status: DISCONTINUED | OUTPATIENT
Start: 2018-01-01 | End: 2018-01-01

## 2018-01-01 RX ORDER — ONDANSETRON 2 MG/ML
4 INJECTION INTRAMUSCULAR; INTRAVENOUS EVERY 6 HOURS PRN
Status: DISCONTINUED | OUTPATIENT
Start: 2018-01-01 | End: 2018-01-01 | Stop reason: HOSPADM

## 2018-01-01 RX ORDER — ZIPRASIDONE MESYLATE 20 MG/ML
20 INJECTION, POWDER, LYOPHILIZED, FOR SOLUTION INTRAMUSCULAR EVERY 4 HOURS PRN
Status: DISCONTINUED | OUTPATIENT
Start: 2018-01-01 | End: 2018-01-01

## 2018-01-01 RX ORDER — FENTANYL CITRATE 50 UG/ML
INJECTION, SOLUTION INTRAMUSCULAR; INTRAVENOUS AS NEEDED
Status: DISCONTINUED | OUTPATIENT
Start: 2018-01-01 | End: 2018-01-01 | Stop reason: HOSPADM

## 2018-01-01 RX ORDER — PROMETHAZINE HYDROCHLORIDE 25 MG/1
12.5 TABLET ORAL EVERY 4 HOURS PRN
Status: DISCONTINUED | OUTPATIENT
Start: 2018-01-01 | End: 2018-12-06 | Stop reason: HOSPADM

## 2018-01-01 RX ORDER — PHENYLEPHRINE HCL IN 0.9% NACL 0.5 MG/5ML
SYRINGE (ML) INTRAVENOUS AS NEEDED
Status: DISCONTINUED | OUTPATIENT
Start: 2018-01-01 | End: 2018-01-01 | Stop reason: HOSPADM

## 2018-01-01 RX ORDER — ONDANSETRON 2 MG/ML
4 INJECTION INTRAMUSCULAR; INTRAVENOUS ONCE
Status: DISCONTINUED | OUTPATIENT
Start: 2018-01-01 | End: 2018-01-01

## 2018-01-01 RX ORDER — MORPHINE SULFATE 20 MG/ML
10 SOLUTION ORAL
Status: DISCONTINUED | OUTPATIENT
Start: 2018-01-01 | End: 2018-12-06 | Stop reason: HOSPADM

## 2018-01-01 RX ORDER — FUROSEMIDE 20 MG/1
20 TABLET ORAL DAILY
COMMUNITY

## 2018-01-01 RX ORDER — MORPHINE SULFATE 2 MG/ML
2 INJECTION, SOLUTION INTRAMUSCULAR; INTRAVENOUS
Status: DISCONTINUED | OUTPATIENT
Start: 2018-01-01 | End: 2018-01-01

## 2018-01-01 RX ORDER — ATORVASTATIN CALCIUM 20 MG/1
20 TABLET, FILM COATED ORAL DAILY
Status: DISCONTINUED | OUTPATIENT
Start: 2018-01-01 | End: 2018-01-01 | Stop reason: HOSPADM

## 2018-01-01 RX ORDER — HYDROMORPHONE HYDROCHLORIDE 1 MG/ML
0.5 INJECTION, SOLUTION INTRAMUSCULAR; INTRAVENOUS; SUBCUTANEOUS
Status: DISCONTINUED | OUTPATIENT
Start: 2018-01-01 | End: 2018-01-01

## 2018-01-01 RX ORDER — FUROSEMIDE 10 MG/ML
40 INJECTION INTRAMUSCULAR; INTRAVENOUS ONCE
Status: COMPLETED | OUTPATIENT
Start: 2018-01-01 | End: 2018-01-01

## 2018-01-01 RX ORDER — SODIUM CHLORIDE 0.9 % (FLUSH) 0.9 %
3 SYRINGE (ML) INJECTION EVERY 12 HOURS SCHEDULED
Status: CANCELLED | OUTPATIENT
Start: 2018-01-01

## 2018-01-01 RX ORDER — LIDOCAINE HYDROCHLORIDE 20 MG/ML
INJECTION, SOLUTION INFILTRATION; PERINEURAL AS NEEDED
Status: DISCONTINUED | OUTPATIENT
Start: 2018-01-01 | End: 2018-01-01 | Stop reason: HOSPADM

## 2018-01-01 RX ORDER — PROMETHAZINE HYDROCHLORIDE 6.25 MG/5ML
12.5 SYRUP ORAL EVERY 4 HOURS PRN
Status: DISCONTINUED | OUTPATIENT
Start: 2018-01-01 | End: 2018-01-01 | Stop reason: HOSPADM

## 2018-01-01 RX ORDER — MORPHINE SULFATE 10 MG/ML
6 INJECTION INTRAMUSCULAR; INTRAVENOUS; SUBCUTANEOUS
Status: DISCONTINUED | OUTPATIENT
Start: 2018-01-01 | End: 2018-12-06 | Stop reason: HOSPADM

## 2018-01-01 RX ORDER — PROMETHAZINE HYDROCHLORIDE 12.5 MG/1
6.25 SUPPOSITORY RECTAL EVERY 4 HOURS PRN
Status: CANCELLED | OUTPATIENT
Start: 2018-01-01

## 2018-01-01 RX ORDER — LORAZEPAM 2 MG/ML
0.5 INJECTION INTRAMUSCULAR
Status: DISCONTINUED | OUTPATIENT
Start: 2018-01-01 | End: 2018-01-01 | Stop reason: HOSPADM

## 2018-01-01 RX ORDER — SCOLOPAMINE TRANSDERMAL SYSTEM 1 MG/1
1 PATCH, EXTENDED RELEASE TRANSDERMAL
Status: DISCONTINUED | OUTPATIENT
Start: 2018-01-01 | End: 2018-01-01 | Stop reason: HOSPADM

## 2018-01-01 RX ORDER — GLYCOPYRROLATE 0.2 MG/ML
0.4 INJECTION INTRAMUSCULAR; INTRAVENOUS
Status: CANCELLED | OUTPATIENT
Start: 2018-01-01

## 2018-01-01 RX ORDER — DIGOXIN 125 MCG
TABLET ORAL
Qty: 30 TABLET | Refills: 0 | OUTPATIENT
Start: 2018-01-01

## 2018-01-01 RX ORDER — SODIUM CHLORIDE 9 MG/ML
75 INJECTION, SOLUTION INTRAVENOUS CONTINUOUS
Status: DISCONTINUED | OUTPATIENT
Start: 2018-01-01 | End: 2018-01-01

## 2018-01-01 RX ORDER — MORPHINE SULFATE 10 MG/ML
6 INJECTION INTRAMUSCULAR; INTRAVENOUS; SUBCUTANEOUS
Status: DISCONTINUED | OUTPATIENT
Start: 2018-01-01 | End: 2018-01-01 | Stop reason: HOSPADM

## 2018-01-01 RX ORDER — MORPHINE SULFATE 20 MG/ML
10 SOLUTION ORAL
Status: DISCONTINUED | OUTPATIENT
Start: 2018-01-01 | End: 2018-01-01 | Stop reason: HOSPADM

## 2018-01-01 RX ORDER — ALUMINA, MAGNESIA, AND SIMETHICONE 2400; 2400; 240 MG/30ML; MG/30ML; MG/30ML
15 SUSPENSION ORAL EVERY 6 HOURS PRN
Status: DISCONTINUED | OUTPATIENT
Start: 2018-01-01 | End: 2018-01-01 | Stop reason: HOSPADM

## 2018-01-01 RX ORDER — PROPRANOLOL HYDROCHLORIDE 20 MG/1
20 TABLET ORAL 2 TIMES DAILY
COMMUNITY

## 2018-01-01 RX ORDER — POTASSIUM CHLORIDE 750 MG/1
20 CAPSULE, EXTENDED RELEASE ORAL DAILY
Status: DISCONTINUED | OUTPATIENT
Start: 2018-01-01 | End: 2018-01-01 | Stop reason: HOSPADM

## 2018-01-01 RX ORDER — ASPIRIN 81 MG/1
TABLET ORAL
Qty: 30 TABLET | Refills: 0 | OUTPATIENT
Start: 2018-01-01

## 2018-01-01 RX ORDER — ATORVASTATIN CALCIUM 10 MG/1
10 TABLET, FILM COATED ORAL NIGHTLY
Status: DISCONTINUED | OUTPATIENT
Start: 2018-01-01 | End: 2018-01-01 | Stop reason: HOSPADM

## 2018-01-01 RX ORDER — PROMETHAZINE HYDROCHLORIDE 25 MG/1
12.5 TABLET ORAL EVERY 4 HOURS PRN
Status: DISCONTINUED | OUTPATIENT
Start: 2018-01-01 | End: 2018-01-01 | Stop reason: HOSPADM

## 2018-01-01 RX ORDER — LORAZEPAM 2 MG/ML
0.5 CONCENTRATE ORAL
Status: DISCONTINUED | OUTPATIENT
Start: 2018-01-01 | End: 2018-01-01 | Stop reason: HOSPADM

## 2018-01-01 RX ORDER — MORPHINE SULFATE 20 MG/ML
10 SOLUTION ORAL
Status: CANCELLED | OUTPATIENT
Start: 2018-01-01 | End: 2018-12-11

## 2018-01-01 RX ORDER — LORAZEPAM 2 MG/ML
2 INJECTION INTRAMUSCULAR
Status: DISCONTINUED | OUTPATIENT
Start: 2018-01-01 | End: 2018-01-01 | Stop reason: HOSPADM

## 2018-01-01 RX ORDER — POTASSIUM CHLORIDE 750 MG/1
40 CAPSULE, EXTENDED RELEASE ORAL AS NEEDED
Status: DISCONTINUED | OUTPATIENT
Start: 2018-01-01 | End: 2018-01-01 | Stop reason: HOSPADM

## 2018-01-01 RX ORDER — MORPHINE SULFATE 2 MG/ML
4 INJECTION, SOLUTION INTRAMUSCULAR; INTRAVENOUS
Status: DISCONTINUED | OUTPATIENT
Start: 2018-01-01 | End: 2018-01-01 | Stop reason: HOSPADM

## 2018-01-01 RX ORDER — POTASSIUM CHLORIDE 750 MG/1
40 CAPSULE, EXTENDED RELEASE ORAL ONCE
Status: COMPLETED | OUTPATIENT
Start: 2018-01-01 | End: 2018-01-01

## 2018-01-01 RX ORDER — PROMETHAZINE HYDROCHLORIDE 25 MG/ML
12.5 INJECTION, SOLUTION INTRAMUSCULAR; INTRAVENOUS EVERY 4 HOURS PRN
Status: CANCELLED | OUTPATIENT
Start: 2018-01-01

## 2018-01-01 RX ORDER — POTASSIUM CHLORIDE 7.45 MG/ML
10 INJECTION INTRAVENOUS ONCE
Status: DISCONTINUED | OUTPATIENT
Start: 2018-01-01 | End: 2018-01-01 | Stop reason: HOSPADM

## 2018-01-01 RX ORDER — DIPHENOXYLATE HYDROCHLORIDE AND ATROPINE SULFATE 2.5; .025 MG/1; MG/1
1 TABLET ORAL
Status: CANCELLED | OUTPATIENT
Start: 2018-01-01

## 2018-01-01 RX ORDER — LORAZEPAM 2 MG/ML
0.5 INJECTION INTRAMUSCULAR
Status: DISCONTINUED | OUTPATIENT
Start: 2018-01-01 | End: 2018-12-06 | Stop reason: HOSPADM

## 2018-01-01 RX ORDER — SODIUM CHLORIDE 0.9 % (FLUSH) 0.9 %
1-10 SYRINGE (ML) INJECTION AS NEEDED
Status: DISCONTINUED | OUTPATIENT
Start: 2018-01-01 | End: 2018-01-01 | Stop reason: HOSPADM

## 2018-01-01 RX ORDER — PROMETHAZINE HYDROCHLORIDE 25 MG/1
6.25 TABLET ORAL EVERY 4 HOURS PRN
Status: CANCELLED | OUTPATIENT
Start: 2018-01-01

## 2018-01-01 RX ORDER — POTASSIUM CHLORIDE 20 MEQ/1
20 TABLET, EXTENDED RELEASE ORAL DAILY
Qty: 5 TABLET | Refills: 0 | Status: SHIPPED | OUTPATIENT
Start: 2018-01-01 | End: 2018-01-01

## 2018-01-01 RX ORDER — OLANZAPINE 2.5 MG/1
2.5 TABLET ORAL DAILY
Status: DISCONTINUED | OUTPATIENT
Start: 2018-01-01 | End: 2018-01-01 | Stop reason: HOSPADM

## 2018-01-01 RX ORDER — MORPHINE SULFATE 20 MG/ML
5 SOLUTION ORAL
Status: DISCONTINUED | OUTPATIENT
Start: 2018-01-01 | End: 2018-01-01

## 2018-01-01 RX ORDER — DIPHENHYDRAMINE HYDROCHLORIDE 50 MG/ML
25 INJECTION INTRAMUSCULAR; INTRAVENOUS EVERY 6 HOURS PRN
Status: DISCONTINUED | OUTPATIENT
Start: 2018-01-01 | End: 2018-12-06 | Stop reason: HOSPADM

## 2018-01-01 RX ORDER — CEFTRIAXONE SODIUM 1 G/50ML
1 INJECTION, SOLUTION INTRAVENOUS EVERY 24 HOURS
Status: COMPLETED | OUTPATIENT
Start: 2018-01-01 | End: 2018-01-01

## 2018-01-01 RX ORDER — PROPRANOLOL HYDROCHLORIDE 20 MG/1
TABLET ORAL
Qty: 60 TABLET | Refills: 2 | Status: SHIPPED | OUTPATIENT
Start: 2018-01-01 | End: 2018-01-01 | Stop reason: SDUPTHER

## 2018-01-01 RX ORDER — PROMETHAZINE HYDROCHLORIDE 25 MG/ML
12.5 INJECTION, SOLUTION INTRAMUSCULAR; INTRAVENOUS EVERY 4 HOURS PRN
Status: DISCONTINUED | OUTPATIENT
Start: 2018-01-01 | End: 2018-01-01 | Stop reason: HOSPADM

## 2018-01-01 RX ORDER — SODIUM CHLORIDE 0.9 % (FLUSH) 0.9 %
10 SYRINGE (ML) INJECTION AS NEEDED
Status: DISCONTINUED | OUTPATIENT
Start: 2018-01-01 | End: 2018-12-06 | Stop reason: HOSPADM

## 2018-01-01 RX ORDER — NITROGLYCERIN 0.4 MG/1
0.4 TABLET SUBLINGUAL
Status: DISCONTINUED | OUTPATIENT
Start: 2018-01-01 | End: 2018-01-01 | Stop reason: HOSPADM

## 2018-01-01 RX ORDER — APIXABAN 5 MG/1
TABLET, FILM COATED ORAL
Qty: 60 TABLET | Refills: 1 | Status: SHIPPED | OUTPATIENT
Start: 2018-01-01 | End: 2018-01-01

## 2018-01-01 RX ORDER — POTASSIUM CHLORIDE 750 MG/1
40 CAPSULE, EXTENDED RELEASE ORAL AS NEEDED
Status: DISCONTINUED | OUTPATIENT
Start: 2018-01-01 | End: 2018-01-01

## 2018-01-01 RX ORDER — DOXYCYCLINE HYCLATE 100 MG/1
100 CAPSULE ORAL 2 TIMES DAILY
Qty: 14 CAPSULE | Refills: 0 | Status: SHIPPED | OUTPATIENT
Start: 2018-01-01 | End: 2018-01-01

## 2018-01-01 RX ORDER — ATORVASTATIN CALCIUM 20 MG/1
20 TABLET, FILM COATED ORAL DAILY
Qty: 30 TABLET | Refills: 6 | Status: SHIPPED | OUTPATIENT
Start: 2018-01-01

## 2018-01-01 RX ORDER — ACETAMINOPHEN 325 MG/1
650 TABLET ORAL EVERY 4 HOURS PRN
Status: DISCONTINUED | OUTPATIENT
Start: 2018-01-01 | End: 2018-12-06 | Stop reason: HOSPADM

## 2018-01-01 RX ORDER — ACETAMINOPHEN 650 MG/1
650 SUPPOSITORY RECTAL EVERY 4 HOURS PRN
Status: DISCONTINUED | OUTPATIENT
Start: 2018-01-01 | End: 2018-12-06 | Stop reason: HOSPADM

## 2018-01-01 RX ADMIN — DONEPEZIL HYDROCHLORIDE 5 MG: 5 TABLET, FILM COATED ORAL at 20:55

## 2018-01-01 RX ADMIN — FUROSEMIDE 40 MG: 10 INJECTION, SOLUTION INTRAMUSCULAR; INTRAVENOUS at 20:55

## 2018-01-01 RX ADMIN — ASPIRIN 81 MG: 81 TABLET ORAL at 10:40

## 2018-01-01 RX ADMIN — POTASSIUM CHLORIDE 40 MEQ: 750 CAPSULE, EXTENDED RELEASE ORAL at 11:29

## 2018-01-01 RX ADMIN — LORAZEPAM 1 MG: 2 INJECTION INTRAMUSCULAR; INTRAVENOUS at 20:24

## 2018-01-01 RX ADMIN — FUROSEMIDE 40 MG: 10 INJECTION, SOLUTION INTRAMUSCULAR; INTRAVENOUS at 03:37

## 2018-01-01 RX ADMIN — MORPHINE SULFATE 4 MG: 10 INJECTION INTRAVENOUS at 20:24

## 2018-01-01 RX ADMIN — METOPROLOL TARTRATE 50 MG: 50 TABLET, FILM COATED ORAL at 23:24

## 2018-01-01 RX ADMIN — POTASSIUM CHLORIDE 10 MEQ: 7.46 INJECTION, SOLUTION INTRAVENOUS at 19:03

## 2018-01-01 RX ADMIN — APIXABAN 2.5 MG: 2.5 TABLET, FILM COATED ORAL at 20:47

## 2018-01-01 RX ADMIN — DIVALPROEX SODIUM 125 MG: 125 CAPSULE, COATED PELLETS ORAL at 21:06

## 2018-01-01 RX ADMIN — APIXABAN 2.5 MG: 2.5 TABLET, FILM COATED ORAL at 20:35

## 2018-01-01 RX ADMIN — TRAMADOL HYDROCHLORIDE 50 MG: 50 TABLET, FILM COATED ORAL at 21:42

## 2018-01-01 RX ADMIN — TRAMADOL HYDROCHLORIDE 50 MG: 50 TABLET, FILM COATED ORAL at 20:49

## 2018-01-01 RX ADMIN — ASPIRIN 81 MG: 81 TABLET ORAL at 12:21

## 2018-01-01 RX ADMIN — DONEPEZIL HYDROCHLORIDE 5 MG: 5 TABLET, FILM COATED ORAL at 20:36

## 2018-01-01 RX ADMIN — ATORVASTATIN CALCIUM 10 MG: 10 TABLET, FILM COATED ORAL at 19:12

## 2018-01-01 RX ADMIN — OLANZAPINE 2.5 MG: 2.5 TABLET, FILM COATED ORAL at 08:56

## 2018-01-01 RX ADMIN — APIXABAN 2.5 MG: 2.5 TABLET, FILM COATED ORAL at 09:01

## 2018-01-01 RX ADMIN — POTASSIUM CHLORIDE 10 MEQ: 7.46 INJECTION, SOLUTION INTRAVENOUS at 21:20

## 2018-01-01 RX ADMIN — METOPROLOL TARTRATE 50 MG: 50 TABLET, FILM COATED ORAL at 20:19

## 2018-01-01 RX ADMIN — APIXABAN 5 MG: 5 TABLET, FILM COATED ORAL at 21:18

## 2018-01-01 RX ADMIN — TAZOBACTAM SODIUM AND PIPERACILLIN SODIUM 3.38 G: 375; 3 INJECTION, SOLUTION INTRAVENOUS at 05:32

## 2018-01-01 RX ADMIN — ATORVASTATIN CALCIUM 20 MG: 20 TABLET, FILM COATED ORAL at 08:59

## 2018-01-01 RX ADMIN — MORPHINE SULFATE 2 MG: 10 INJECTION INTRAVENOUS at 14:35

## 2018-01-01 RX ADMIN — CEFEPIME HYDROCHLORIDE 2 G: 2 INJECTION, POWDER, FOR SOLUTION INTRAVENOUS at 18:32

## 2018-01-01 RX ADMIN — ATORVASTATIN CALCIUM 20 MG: 20 TABLET, FILM COATED ORAL at 10:10

## 2018-01-01 RX ADMIN — SODIUM CHLORIDE 100 ML/HR: 9 INJECTION, SOLUTION INTRAVENOUS at 08:52

## 2018-01-01 RX ADMIN — ASPIRIN 81 MG: 81 TABLET ORAL at 08:51

## 2018-01-01 RX ADMIN — OLANZAPINE 5 MG: 5 TABLET ORAL at 00:22

## 2018-01-01 RX ADMIN — DIVALPROEX SODIUM 250 MG: 125 CAPSULE, COATED PELLETS ORAL at 20:47

## 2018-01-01 RX ADMIN — DIVALPROEX SODIUM 125 MG: 125 CAPSULE, COATED PELLETS ORAL at 17:00

## 2018-01-01 RX ADMIN — SODIUM CHLORIDE 100 ML/HR: 9 INJECTION, SOLUTION INTRAVENOUS at 14:15

## 2018-01-01 RX ADMIN — METOPROLOL TARTRATE 50 MG: 50 TABLET, FILM COATED ORAL at 08:01

## 2018-01-01 RX ADMIN — OLANZAPINE 5 MG: 5 TABLET ORAL at 20:36

## 2018-01-01 RX ADMIN — PROPRANOLOL HYDROCHLORIDE 20 MG: 20 TABLET ORAL at 09:47

## 2018-01-01 RX ADMIN — ENOXAPARIN SODIUM 80 MG: 80 INJECTION SUBCUTANEOUS at 20:55

## 2018-01-01 RX ADMIN — TRAMADOL HYDROCHLORIDE 50 MG: 50 TABLET, FILM COATED ORAL at 21:07

## 2018-01-01 RX ADMIN — FUROSEMIDE 40 MG: 40 TABLET ORAL at 08:51

## 2018-01-01 RX ADMIN — MORPHINE SULFATE 2 MG: 10 INJECTION INTRAVENOUS at 06:17

## 2018-01-01 RX ADMIN — MORPHINE SULFATE 6 MG: 10 INJECTION INTRAVENOUS at 16:30

## 2018-01-01 RX ADMIN — ATORVASTATIN CALCIUM 20 MG: 20 TABLET, FILM COATED ORAL at 09:40

## 2018-01-01 RX ADMIN — APIXABAN 5 MG: 5 TABLET, FILM COATED ORAL at 19:10

## 2018-01-01 RX ADMIN — DIVALPROEX SODIUM 250 MG: 125 CAPSULE, COATED PELLETS ORAL at 08:58

## 2018-01-01 RX ADMIN — DIGOXIN 250 MCG: 0.25 INJECTION INTRAMUSCULAR; INTRAVENOUS at 14:44

## 2018-01-01 RX ADMIN — METOPROLOL TARTRATE 50 MG: 50 TABLET, FILM COATED ORAL at 10:40

## 2018-01-01 RX ADMIN — FUROSEMIDE 20 MG: 20 TABLET ORAL at 09:07

## 2018-01-01 RX ADMIN — VANCOMYCIN HYDROCHLORIDE 1250 MG: 10 INJECTION, POWDER, LYOPHILIZED, FOR SOLUTION INTRAVENOUS at 19:26

## 2018-01-01 RX ADMIN — OLANZAPINE 2.5 MG: 2.5 TABLET, FILM COATED ORAL at 11:34

## 2018-01-01 RX ADMIN — FUROSEMIDE 40 MG: 10 INJECTION, SOLUTION INTRAMUSCULAR; INTRAVENOUS at 18:19

## 2018-01-01 RX ADMIN — POTASSIUM CHLORIDE 10 MEQ: 7.46 INJECTION, SOLUTION INTRAVENOUS at 06:43

## 2018-01-01 RX ADMIN — TRAMADOL HYDROCHLORIDE 50 MG: 50 TABLET, FILM COATED ORAL at 20:28

## 2018-01-01 RX ADMIN — PROPRANOLOL HYDROCHLORIDE 20 MG: 20 TABLET ORAL at 09:56

## 2018-01-01 RX ADMIN — DIGOXIN 125 MCG: 0.12 TABLET ORAL at 13:03

## 2018-01-01 RX ADMIN — PROPRANOLOL HYDROCHLORIDE 20 MG: 20 TABLET ORAL at 21:18

## 2018-01-01 RX ADMIN — OLANZAPINE 2.5 MG: 2.5 TABLET, FILM COATED ORAL at 10:10

## 2018-01-01 RX ADMIN — ENOXAPARIN SODIUM 80 MG: 80 INJECTION SUBCUTANEOUS at 12:21

## 2018-01-01 RX ADMIN — APIXABAN 5 MG: 5 TABLET, FILM COATED ORAL at 09:56

## 2018-01-01 RX ADMIN — POTASSIUM CHLORIDE 40 MEQ: 750 CAPSULE, EXTENDED RELEASE ORAL at 07:13

## 2018-01-01 RX ADMIN — OLANZAPINE 5 MG: 5 TABLET ORAL at 20:35

## 2018-01-01 RX ADMIN — MORPHINE SULFATE 2 MG: 10 INJECTION INTRAVENOUS at 14:13

## 2018-01-01 RX ADMIN — FUROSEMIDE 40 MG: 10 INJECTION, SOLUTION INTRAMUSCULAR; INTRAVENOUS at 05:37

## 2018-01-01 RX ADMIN — CEFTRIAXONE SODIUM 1 G: 1 INJECTION, SOLUTION INTRAVENOUS at 16:47

## 2018-01-01 RX ADMIN — DIVALPROEX SODIUM 250 MG: 125 CAPSULE, COATED PELLETS ORAL at 10:34

## 2018-01-01 RX ADMIN — TRAMADOL HYDROCHLORIDE 50 MG: 50 TABLET, FILM COATED ORAL at 21:18

## 2018-01-01 RX ADMIN — LORAZEPAM 0.5 MG: 2 INJECTION INTRAMUSCULAR; INTRAVENOUS at 16:52

## 2018-01-01 RX ADMIN — ENOXAPARIN SODIUM 70 MG: 80 INJECTION SUBCUTANEOUS at 14:04

## 2018-01-01 RX ADMIN — APIXABAN 5 MG: 5 TABLET, FILM COATED ORAL at 21:06

## 2018-01-01 RX ADMIN — SODIUM CHLORIDE 100 ML/HR: 9 INJECTION, SOLUTION INTRAVENOUS at 03:26

## 2018-01-01 RX ADMIN — POTASSIUM CHLORIDE 40 MEQ: 1.5 POWDER, FOR SOLUTION ORAL at 09:29

## 2018-01-01 RX ADMIN — REGADENOSON 0.4 MG: 0.08 INJECTION, SOLUTION INTRAVENOUS at 09:15

## 2018-01-01 RX ADMIN — AMLODIPINE BESYLATE 2.5 MG: 2.5 TABLET ORAL at 13:03

## 2018-01-01 RX ADMIN — OLANZAPINE 5 MG: 10 INJECTION, POWDER, FOR SOLUTION INTRAMUSCULAR at 04:47

## 2018-01-01 RX ADMIN — DONEPEZIL HYDROCHLORIDE 5 MG: 5 TABLET, FILM COATED ORAL at 00:23

## 2018-01-01 RX ADMIN — CEFTRIAXONE SODIUM 1 G: 1 INJECTION, SOLUTION INTRAVENOUS at 15:37

## 2018-01-01 RX ADMIN — OLANZAPINE 5 MG: 5 TABLET ORAL at 21:06

## 2018-01-01 RX ADMIN — APIXABAN 2.5 MG: 2.5 TABLET, FILM COATED ORAL at 00:22

## 2018-01-01 RX ADMIN — APIXABAN 5 MG: 5 TABLET, FILM COATED ORAL at 20:25

## 2018-01-01 RX ADMIN — DONEPEZIL HYDROCHLORIDE 5 MG: 5 TABLET, FILM COATED ORAL at 21:06

## 2018-01-01 RX ADMIN — OLANZAPINE 5 MG: 5 TABLET ORAL at 20:26

## 2018-01-01 RX ADMIN — OLANZAPINE 2.5 MG: 2.5 TABLET, FILM COATED ORAL at 09:54

## 2018-01-01 RX ADMIN — FUROSEMIDE 40 MG: 10 INJECTION, SOLUTION INTRAMUSCULAR; INTRAVENOUS at 10:40

## 2018-01-01 RX ADMIN — TECHNETIUM TC 99M SESTAMIBI 1 DOSE: 1 INJECTION INTRAVENOUS at 06:30

## 2018-01-01 RX ADMIN — ATORVASTATIN CALCIUM 20 MG: 20 TABLET, FILM COATED ORAL at 09:56

## 2018-01-01 RX ADMIN — METOPROLOL TARTRATE 50 MG: 50 TABLET, FILM COATED ORAL at 19:12

## 2018-01-01 RX ADMIN — SODIUM CHLORIDE, POTASSIUM CHLORIDE, SODIUM LACTATE AND CALCIUM CHLORIDE 125 ML/HR: 600; 310; 30; 20 INJECTION, SOLUTION INTRAVENOUS at 13:38

## 2018-01-01 RX ADMIN — OLANZAPINE 5 MG: 5 TABLET ORAL at 21:39

## 2018-01-01 RX ADMIN — DIVALPROEX SODIUM 250 MG: 125 CAPSULE, COATED PELLETS ORAL at 18:06

## 2018-01-01 RX ADMIN — DIVALPROEX SODIUM 125 MG: 125 CAPSULE, COATED PELLETS ORAL at 18:00

## 2018-01-01 RX ADMIN — SODIUM CHLORIDE, POTASSIUM CHLORIDE, SODIUM LACTATE AND CALCIUM CHLORIDE 125 ML/HR: 600; 310; 30; 20 INJECTION, SOLUTION INTRAVENOUS at 01:52

## 2018-01-01 RX ADMIN — APIXABAN 2.5 MG: 2.5 TABLET, FILM COATED ORAL at 10:08

## 2018-01-01 RX ADMIN — POTASSIUM CHLORIDE 20 MEQ: 750 CAPSULE, EXTENDED RELEASE ORAL at 08:51

## 2018-01-01 RX ADMIN — FUROSEMIDE 40 MG: 10 INJECTION, SOLUTION INTRAMUSCULAR; INTRAVENOUS at 06:37

## 2018-01-01 RX ADMIN — PANTOPRAZOLE SODIUM 40 MG: 40 TABLET, DELAYED RELEASE ORAL at 07:02

## 2018-01-01 RX ADMIN — POTASSIUM CHLORIDE 10 MEQ: 7.46 INJECTION, SOLUTION INTRAVENOUS at 20:13

## 2018-01-01 RX ADMIN — SODIUM CHLORIDE, POTASSIUM CHLORIDE, SODIUM LACTATE AND CALCIUM CHLORIDE 125 ML/HR: 600; 310; 30; 20 INJECTION, SOLUTION INTRAVENOUS at 19:07

## 2018-01-01 RX ADMIN — VITAMIN D, TAB 1000IU (100/BT) 5000 UNITS: 25 TAB at 09:07

## 2018-01-01 RX ADMIN — DIVALPROEX SODIUM 250 MG: 125 CAPSULE, COATED PELLETS ORAL at 20:35

## 2018-01-01 RX ADMIN — APIXABAN 5 MG: 5 TABLET, FILM COATED ORAL at 09:48

## 2018-01-01 RX ADMIN — DIVALPROEX SODIUM 250 MG: 125 CAPSULE, COATED PELLETS ORAL at 09:01

## 2018-01-01 RX ADMIN — PANTOPRAZOLE SODIUM 40 MG: 40 TABLET, DELAYED RELEASE ORAL at 10:39

## 2018-01-01 RX ADMIN — ASPIRIN 81 MG: 81 TABLET ORAL at 08:32

## 2018-01-01 RX ADMIN — DIVALPROEX SODIUM 250 MG: 125 CAPSULE, COATED PELLETS ORAL at 08:36

## 2018-01-01 RX ADMIN — POTASSIUM CHLORIDE 10 MEQ: 7.46 INJECTION, SOLUTION INTRAVENOUS at 17:49

## 2018-01-01 RX ADMIN — POTASSIUM CHLORIDE 40 MEQ: 750 CAPSULE, EXTENDED RELEASE ORAL at 06:37

## 2018-01-01 RX ADMIN — ASPIRIN 81 MG: 81 TABLET ORAL at 22:26

## 2018-01-01 RX ADMIN — POTASSIUM CHLORIDE 40 MEQ: 750 CAPSULE, EXTENDED RELEASE ORAL at 01:08

## 2018-01-01 RX ADMIN — PROPRANOLOL HYDROCHLORIDE 20 MG: 20 TABLET ORAL at 20:35

## 2018-01-01 RX ADMIN — ATORVASTATIN CALCIUM 20 MG: 20 TABLET, FILM COATED ORAL at 09:01

## 2018-01-01 RX ADMIN — DIVALPROEX SODIUM 250 MG: 125 CAPSULE, COATED PELLETS ORAL at 20:54

## 2018-01-01 RX ADMIN — DIVALPROEX SODIUM 250 MG: 125 CAPSULE, COATED PELLETS ORAL at 10:12

## 2018-01-01 RX ADMIN — VITAMIN D, TAB 1000IU (100/BT) 5000 UNITS: 25 TAB at 09:00

## 2018-01-01 RX ADMIN — METOPROLOL TARTRATE 50 MG: 50 TABLET, FILM COATED ORAL at 20:55

## 2018-01-01 RX ADMIN — DIVALPROEX SODIUM 125 MG: 125 CAPSULE, COATED PELLETS ORAL at 21:39

## 2018-01-01 RX ADMIN — POTASSIUM CHLORIDE 40 MEQ: 750 CAPSULE, EXTENDED RELEASE ORAL at 15:16

## 2018-01-01 RX ADMIN — DIVALPROEX SODIUM 250 MG: 125 CAPSULE, COATED PELLETS ORAL at 10:00

## 2018-01-01 RX ADMIN — PROPRANOLOL HYDROCHLORIDE 20 MG: 20 TABLET ORAL at 08:56

## 2018-01-01 RX ADMIN — METOPROLOL TARTRATE 50 MG: 50 TABLET, FILM COATED ORAL at 02:54

## 2018-01-01 RX ADMIN — HYDROXYZINE PAMOATE 50 MG: 25 CAPSULE ORAL at 20:27

## 2018-01-01 RX ADMIN — DONEPEZIL HYDROCHLORIDE 5 MG: 5 TABLET, FILM COATED ORAL at 20:47

## 2018-01-01 RX ADMIN — INFLUENZA A VIRUS A/SINGAPORE/GP1908/2015 IVR-180 (H1N1) ANTIGEN (MDCK CELL DERIVED, PROPIOLACTONE INACTIVATED), INFLUENZA A VIRUS A/NORTH CAROLINA/04/2016 (H3N2) HEMAGGLUTININ ANTIGEN (MDCK CELL DERIVED, PROPIOLACTONE INACTIVATED), INFLUENZA B VIRUS B/IOWA/06/2017 HEMAGGLUTININ ANTIGEN (MDCK CELL DERIVED, PROPIOLACTONE INACTIVATED), INFLUENZA B VIRUS B/SINGAPORE/INFTT-16-0610/2016 HEMAGGLUTININ ANTIGEN (MDCK CELL DERIVED, PROPIOLACTONE INACTIVATED) 0.5 ML: 15; 15; 15; 15 INJECTION, SUSPENSION INTRAMUSCULAR at 16:18

## 2018-01-01 RX ADMIN — SODIUM CHLORIDE, POTASSIUM CHLORIDE, SODIUM LACTATE AND CALCIUM CHLORIDE 500 ML: 600; 310; 30; 20 INJECTION, SOLUTION INTRAVENOUS at 16:03

## 2018-01-01 RX ADMIN — PROPRANOLOL HYDROCHLORIDE 20 MG: 20 TABLET ORAL at 09:40

## 2018-01-01 RX ADMIN — APIXABAN 5 MG: 5 TABLET, FILM COATED ORAL at 11:12

## 2018-01-01 RX ADMIN — POTASSIUM CHLORIDE 100 ML/HR: 149 INJECTION, SOLUTION, CONCENTRATE INTRAVENOUS at 21:52

## 2018-01-01 RX ADMIN — ENOXAPARIN SODIUM 80 MG: 80 INJECTION SUBCUTANEOUS at 20:19

## 2018-01-01 RX ADMIN — APIXABAN 2.5 MG: 2.5 TABLET, FILM COATED ORAL at 09:43

## 2018-01-01 RX ADMIN — PROPRANOLOL HYDROCHLORIDE 20 MG: 20 TABLET ORAL at 10:10

## 2018-01-01 RX ADMIN — PROPRANOLOL HYDROCHLORIDE 20 MG: 20 TABLET ORAL at 20:25

## 2018-01-01 RX ADMIN — MORPHINE SULFATE 4 MG: 10 INJECTION INTRAVENOUS at 19:02

## 2018-01-01 RX ADMIN — FUROSEMIDE 20 MG: 20 TABLET ORAL at 09:47

## 2018-01-01 RX ADMIN — HYDROXYZINE PAMOATE 50 MG: 25 CAPSULE ORAL at 17:27

## 2018-01-01 RX ADMIN — VITAMIN D, TAB 1000IU (100/BT) 5000 UNITS: 25 TAB at 09:57

## 2018-01-01 RX ADMIN — VITAMIN D, TAB 1000IU (100/BT) 5000 UNITS: 25 TAB at 08:32

## 2018-01-01 RX ADMIN — SODIUM CHLORIDE 100 ML/HR: 9 INJECTION, SOLUTION INTRAVENOUS at 17:46

## 2018-01-01 RX ADMIN — FUROSEMIDE 20 MG: 20 TABLET ORAL at 08:32

## 2018-01-01 RX ADMIN — VITAMIN D, TAB 1000IU (100/BT) 5000 UNITS: 25 TAB at 22:26

## 2018-01-01 RX ADMIN — ATORVASTATIN CALCIUM 20 MG: 20 TABLET, FILM COATED ORAL at 09:48

## 2018-01-01 RX ADMIN — VITAMIN D, TAB 1000IU (100/BT) 5000 UNITS: 25 TAB at 10:09

## 2018-01-01 RX ADMIN — TECHNETIUM TC 99M SESTAMIBI 1 DOSE: 1 INJECTION INTRAVENOUS at 09:15

## 2018-01-01 RX ADMIN — PANTOPRAZOLE SODIUM 40 MG: 40 TABLET, DELAYED RELEASE ORAL at 06:38

## 2018-01-01 RX ADMIN — SODIUM CHLORIDE, POTASSIUM CHLORIDE, SODIUM LACTATE AND CALCIUM CHLORIDE 1500 ML: 600; 310; 30; 20 INJECTION, SOLUTION INTRAVENOUS at 17:00

## 2018-01-01 RX ADMIN — METOPROLOL TARTRATE 50 MG: 50 TABLET, FILM COATED ORAL at 08:32

## 2018-01-01 RX ADMIN — IOPAMIDOL 85 ML: 612 INJECTION, SOLUTION INTRAVENOUS at 18:18

## 2018-01-01 RX ADMIN — ATORVASTATIN CALCIUM 20 MG: 20 TABLET, FILM COATED ORAL at 09:07

## 2018-01-01 RX ADMIN — POTASSIUM CHLORIDE 10 MEQ: 7.46 INJECTION, SOLUTION INTRAVENOUS at 21:51

## 2018-01-01 RX ADMIN — APIXABAN 5 MG: 5 TABLET, FILM COATED ORAL at 21:39

## 2018-01-01 RX ADMIN — PANTOPRAZOLE SODIUM 40 MG: 40 TABLET, DELAYED RELEASE ORAL at 06:17

## 2018-01-01 RX ADMIN — APIXABAN 2.5 MG: 2.5 TABLET, FILM COATED ORAL at 08:59

## 2018-01-01 RX ADMIN — DIGOXIN 125 MCG: 0.12 TABLET ORAL at 14:02

## 2018-01-01 RX ADMIN — DIVALPROEX SODIUM 250 MG: 125 CAPSULE, COATED PELLETS ORAL at 20:36

## 2018-01-01 RX ADMIN — ENOXAPARIN SODIUM 70 MG: 80 INJECTION SUBCUTANEOUS at 13:03

## 2018-01-01 RX ADMIN — ASPIRIN 81 MG: 81 TABLET ORAL at 08:01

## 2018-01-01 RX ADMIN — NOREPINEPHRINE BITARTRATE 0.02 MCG/KG/MIN: 8 SOLUTION at 19:59

## 2018-01-01 RX ADMIN — CEFTRIAXONE SODIUM 1 G: 1 INJECTION, SOLUTION INTRAVENOUS at 17:45

## 2018-01-01 RX ADMIN — METOPROLOL TARTRATE 50 MG: 50 TABLET, FILM COATED ORAL at 12:23

## 2018-01-01 RX ADMIN — FUROSEMIDE 40 MG: 10 INJECTION, SOLUTION INTRAMUSCULAR; INTRAVENOUS at 15:16

## 2018-01-01 RX ADMIN — OLANZAPINE 10 MG: 10 TABLET, ORALLY DISINTEGRATING ORAL at 17:02

## 2018-01-01 RX ADMIN — DIVALPROEX SODIUM 250 MG: 125 CAPSULE, COATED PELLETS ORAL at 09:43

## 2018-01-01 RX ADMIN — MORPHINE SULFATE 4 MG: 10 INJECTION INTRAVENOUS at 12:40

## 2018-01-01 RX ADMIN — VITAMIN D, TAB 1000IU (100/BT) 5000 UNITS: 25 TAB at 09:47

## 2018-01-01 RX ADMIN — DONEPEZIL HYDROCHLORIDE 5 MG: 5 TABLET, FILM COATED ORAL at 20:35

## 2018-01-01 RX ADMIN — PROPRANOLOL HYDROCHLORIDE 20 MG: 20 TABLET ORAL at 08:59

## 2018-01-01 RX ADMIN — TAZOBACTAM SODIUM AND PIPERACILLIN SODIUM 3.38 G: 375; 3 INJECTION, SOLUTION INTRAVENOUS at 23:45

## 2018-01-01 RX ADMIN — ENOXAPARIN SODIUM 70 MG: 80 INJECTION SUBCUTANEOUS at 23:24

## 2018-01-01 RX ADMIN — FUROSEMIDE 20 MG: 20 TABLET ORAL at 22:25

## 2018-01-01 RX ADMIN — DIVALPROEX SODIUM 125 MG: 125 CAPSULE, COATED PELLETS ORAL at 10:02

## 2018-01-01 RX ADMIN — DIVALPROEX SODIUM 125 MG: 125 CAPSULE, COATED PELLETS ORAL at 09:47

## 2018-01-01 RX ADMIN — FUROSEMIDE 20 MG: 20 TABLET ORAL at 09:56

## 2018-01-01 RX ADMIN — HYDROXYZINE PAMOATE 50 MG: 25 CAPSULE ORAL at 21:18

## 2018-01-01 RX ADMIN — MORPHINE SULFATE 4 MG: 10 INJECTION INTRAVENOUS at 08:37

## 2018-01-01 RX ADMIN — MORPHINE SULFATE 2 MG: 10 INJECTION INTRAVENOUS at 11:13

## 2018-01-01 RX ADMIN — OLANZAPINE 5 MG: 5 TABLET ORAL at 20:48

## 2018-01-01 RX ADMIN — SODIUM CHLORIDE 75 ML/HR: 9 INJECTION, SOLUTION INTRAVENOUS at 12:31

## 2018-01-01 RX ADMIN — DONEPEZIL HYDROCHLORIDE 5 MG: 5 TABLET, FILM COATED ORAL at 21:39

## 2018-01-01 RX ADMIN — OLANZAPINE 2.5 MG: 2.5 TABLET, FILM COATED ORAL at 08:59

## 2018-01-01 RX ADMIN — MORPHINE SULFATE 2 MG: 10 INJECTION INTRAVENOUS at 17:30

## 2018-01-01 RX ADMIN — METOPROLOL TARTRATE 50 MG: 50 TABLET, FILM COATED ORAL at 08:52

## 2018-01-01 RX ADMIN — VITAMIN D, TAB 1000IU (100/BT) 5000 UNITS: 25 TAB at 08:57

## 2018-01-01 RX ADMIN — POTASSIUM CHLORIDE 100 ML/HR: 149 INJECTION, SOLUTION, CONCENTRATE INTRAVENOUS at 11:54

## 2018-01-01 RX ADMIN — PROPRANOLOL HYDROCHLORIDE 20 MG: 20 TABLET ORAL at 21:06

## 2018-01-01 RX ADMIN — VITAMIN D, TAB 1000IU (100/BT) 5000 UNITS: 25 TAB at 09:40

## 2018-01-01 RX ADMIN — METOPROLOL TARTRATE 50 MG: 50 TABLET, FILM COATED ORAL at 22:25

## 2018-01-01 RX ADMIN — DIVALPROEX SODIUM 250 MG: 125 CAPSULE, COATED PELLETS ORAL at 00:22

## 2018-01-01 RX ADMIN — PROPRANOLOL HYDROCHLORIDE 20 MG: 20 TABLET ORAL at 11:13

## 2018-01-01 RX ADMIN — ENOXAPARIN SODIUM 80 MG: 80 INJECTION SUBCUTANEOUS at 08:46

## 2018-01-01 RX ADMIN — CEFTRIAXONE SODIUM 1 G: 1 INJECTION, SOLUTION INTRAVENOUS at 17:01

## 2018-01-01 RX ADMIN — MORPHINE SULFATE 2 MG: 10 INJECTION INTRAVENOUS at 16:18

## 2018-01-01 RX ADMIN — ENOXAPARIN SODIUM 80 MG: 80 INJECTION SUBCUTANEOUS at 10:40

## 2018-04-04 PROBLEM — J90 PLEURAL EFFUSION, BILATERAL: Status: ACTIVE | Noted: 2018-01-01

## 2018-04-04 PROBLEM — I48.91 NEW ONSET ATRIAL FIBRILLATION (HCC): Status: ACTIVE | Noted: 2018-01-01

## 2018-04-04 PROBLEM — I50.9 ACUTE CONGESTIVE HEART FAILURE (HCC): Status: ACTIVE | Noted: 2018-01-01

## 2018-04-04 PROBLEM — J96.01 ACUTE RESPIRATORY FAILURE WITH HYPOXIA (HCC): Status: ACTIVE | Noted: 2018-01-01

## 2018-04-05 PROBLEM — E87.6 HYPOKALEMIA: Status: ACTIVE | Noted: 2018-01-01

## 2018-04-06 PROBLEM — R94.39 ABNORMAL STRESS TEST: Status: ACTIVE | Noted: 2018-01-01

## 2018-04-09 PROBLEM — I25.10 CAD (CORONARY ARTERY DISEASE): Status: ACTIVE | Noted: 2018-01-01

## 2018-04-09 NOTE — DISCHARGE INSTRUCTIONS
[facility]  [address] [place] [address]    Coronary Angiogram (Radial/Ulnar Approach) After Care    Refer to this sheet in the next few weeks. These instructions provide you with information on caring for yourself after your procedure. Your caregiver may also give you more specific instructions. Your treatment has been planned according to current medical practices, but problems sometimes occur. Call your caregiver if you have any problems or questions after your procedure.    Home Care Instructions:  · You may shower the day after the procedure. Remove the bandage (dressing) and gently wash the site with plain soap and water. Gently pat the site dry. You may apply a band aid daily for 2 days if desired.    · Do not apply powder or lotion to the site.  · Do not submerge the affected site in water for 3 to 5 days or until the site is completely healed.   · Do not lift, push or pull anything over 10 pounds for 2 days after your procedure.  · Inspect the site at least twice daily. You may notice some bruising at the site and it may be tender for 1 to 2 weeks.     · Increase your fluid intake for the next 2 days.    · Keep arm elevated for 24 hours. For the remainder of the day, keep your arm in “Pledge of Allegiance” position when up and about.     · You may drive 24 hours after the procedure unless otherwise instructed by your caregiver.  · Do not operate machinery or power tools for 24 hours.  · A responsible adult should be with you for the first 24 hours after you arrive home. Do not make any important legal decisions or sign legal papers for 24 hours.  Do not drink alcohol for 24 hours.    · Metformin or any medications containing Metformin should not be taken for 48 hours after your procedure.      Call Your Doctor if:   · You have unusual pain at the radial/ulnar (wrist) site.  · You have redness, warmth, swelling, or pain at the radial/ulnar (wrist) site.  · You have drainage (other  than a small amount of blood on the dressing).  · You have chills or a fever > 101.  · Your arm becomes pale or dark, cool, tingly, or numb.  · You have heavy bleeding from the site, hold pressure on the site for 20 minutes.  If the bleeding stops, apply a fresh bandage and call your cardiologist.  However, if you continue to have bleeding, call 911.

## 2018-04-09 NOTE — PLAN OF CARE
Problem: Patient Care Overview  Goal: Plan of Care Review  Outcome: Ongoing (interventions implemented as appropriate)   04/09/18 4766   Coping/Psychosocial   Plan of Care Reviewed With patient   Plan of Care Review   Progress no change   OTHER   Outcome Summary Patient denies complaints. Wants to go home. Vitals stable. Family at bedside. Will continue to monitor.      Goal: Individualization and Mutuality  Outcome: Ongoing (interventions implemented as appropriate)    Goal: Discharge Needs Assessment  Outcome: Ongoing (interventions implemented as appropriate)    Goal: Interprofessional Rounds/Family Conf  Outcome: Ongoing (interventions implemented as appropriate)      Problem: Cardiac Catheterization (Diagnostic/Interventional) (Adult)  Goal: Signs and Symptoms of Listed Potential Problems Will be Absent, Minimized or Managed (Cardiac Catheterization)  Outcome: Ongoing (interventions implemented as appropriate)    Goal: Anesthesia/Sedation Recovery  Outcome: Ongoing (interventions implemented as appropriate)      Problem: Fall Risk (Adult)  Goal: Identify Related Risk Factors and Signs and Symptoms  Outcome: Ongoing (interventions implemented as appropriate)    Goal: Absence of Fall  Outcome: Ongoing (interventions implemented as appropriate)      Problem: Skin Injury Risk (Adult)  Goal: Identify Related Risk Factors and Signs and Symptoms  Outcome: Outcome(s) achieved Date Met: 04/09/18    Goal: Skin Health and Integrity  Outcome: Ongoing (interventions implemented as appropriate)

## 2018-04-10 NOTE — DISCHARGE SUMMARY
[facility]  Physician Discharge Summary    Patient Identification:  Name: Marie Delgado  Age: 88 y.o.  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  Catheter showed severe multivessel CAD elutin% proximal LAD, 90% diagonal at its origin and 90% LCx.  She tolerated the procedure without complications    She was seen in consultation by cardiothoracic surgery.  Thoracic surgery did not feel she would be an optimal surgical candidate with her advanced age and patient stated she did not 1 to proceed with cardiac surgery.  Family was in agreement.    Antianginal therapy was optimized.    At time of discharge, patient's awake, alert resting quietly.  She denies any chest pain, tightness or shortness of breath.  I reviewed discharge instructions with the patient, her sister and nephew including: Changes to home medications, importance of compliance with medications and treatments, fluid restriction, daily weights, activity restrictions, cath site care and follow-up appointments.  Rationale for/use of sublingual nitroglycerin was reviewed. In the setting of atrial fibrillation, I also discussed with the patient/family the risks and benefits of anticoagulation therapy which include increased risk of bleeding and decreased risk of systemic embolization such as stroke.   She was advised to  follow-up with her PCP in one week for ongoing medical care including management of abnormal TSH and dyslipidemia.  Repeat LFTs and lipid panel recommended in 8-12 weeks since his statin has been started. They were told to call our office tomorrow to set up a follow-up appointment 2-3 weeks  All questions were answered.  Patient/family verbalize understanding and agree with treatment plan. .         Consults:   CARDIAC REHAB EVALUATION AND ENROLLMENT  IP CONSULT TO CARDIOTHORACIC SURGERY    Discharge Exam:  General:  Appears in no acute distress  Eyes: PERTL,  HEENT:  No JVD.  Oral mucosa moist, no pallor or cyanosis  Respiratory: Respirations regular and unlabored at rest and room air. BBS with air entry in all fields. Minimal right basilar crackles, no rubs or wheezes auscultated  Cardiovascular: S1S2 irregular rate and rhythm. No murmur. No pretibial pitting edema  Gastrointestinal: Abdomen soft, non tender. Bowel sounds present.  Musculoskeletal: VÁSQUEZ x4. No abnormal movements  Extremities: Right radial procedure site without oozing, palpable hematoma or thrill.  Right brachial 1-to/right radial 1-right ulnar 1.  RUE pink, warm with one-2 second capillary refill No digital clubbing or cyanosis  Skin: Color pink. Skin warm and dry to touch.   Neuro: AAO x3 CN II-XII grossly intact  Psych: Mood and affect normal, pleasant and cooperative     Telemetry:        ECG 4-10-18:            LABS:  Results from last 7 days  Lab Units 04/10/18  0513   SODIUM mmol/L 143   POTASSIUM mmol/L 3.4*   BUN mg/dL 16   CREATININE mg/dL 0.78   CALCIUM mg/dL 9.3       Results from last 7 days  Lab Units 04/10/18  0513 04/04/18  0217   WBC 10*3/mm3 7.41 7.90   HEMOGLOBIN g/dL 12.9 13.7   HEMATOCRIT % 42.9 44.1   PLATELETS 10*3/mm3 168 203       Results from last 7 days  Lab Units 04/10/18  0513   CHOLESTEROL mg/dL 157   TRIGLYCERIDES mg/dL 113   HDL CHOL mg/dL 35*   LDL CHOL mg/dL 99     Disposition:  home    Discharge Medications:     Marie Delgado   Home Medication Instructions LUCY:[id number]    Printed on:04/10/18 3337   Medication Information                      amLODIPine (NORVASC) 2.5 MG tablet  Take 1 tablet by mouth Daily.             apixaban (ELIQUIS) 5 MG tablet tablet  Take 1 tablet by mouth Every 12 (Twelve) Hours.             atorvastatin (LIPITOR) 10 MG tablet  Take 1 tablet by mouth Every Night.             Cholecalciferol (VITAMIN D3) 5000 UNITS tablet  Take 5,000 Units by mouth daily.             diclofenac (VOLTAREN) 1 % gel gel  Apply 4 g topically 4 (Four) Times a Day As Needed (knee pain). Rub in well             digoxin (LANOXIN) 125 MCG tablet  Take 1 tablet by mouth Daily.             furosemide (LASIX) 20 MG tablet  Take 1 tablet by mouth Daily. Pt. reportedly             metoprolol tartrate (LOPRESSOR) 50 MG tablet  Take 1 tablet by mouth Every 12 (Twelve) Hours.             omeprazole (PRILOSEC) 20 MG capsule  Take 1 capsule by mouth Daily. For GERD             ondansetron (ZOFRAN) 4 MG tablet  Take 1 tablet by mouth Every 8 (Eight) Hours As Needed for Nausea or Vomiting. 1-2 tabs PO Q 8 hours PRN nausea                   Discharge Home Instructions:  1. Weigh self daily.  Keep record.  Call for  >/= 4lb gain </= 3 days, shortness of breath or swelling   2.  1500cc/day fluid restriction with no added salt diet  3. Return to ER per EMS for any recurrent symptoms including, but not limited to: chest pain/pressure/tightness, weakness, Shortness of breath, dizziness, palpitations, near syncope or syncope  4.Routine post cardiac catheterization discharge home care instructions:      No submerging procedure site below water for 7-10 days.      No lifting objects greater than 1 lbs for 3 days.    . Monitor puncture site for bleeding and/or knots; If bleeding should occur at the wrist site, apply pressure and return to the ER.      You may apply a DRY Band-Aid over the puncture site if needed. Do not apply any lotions,  "salves or ointments to site.      No driving for 3 days.      Return to ER for recurrent symptoms.      No smoking.      Take all medications as prescribed.  4. Follow up with PCP in one week for ongoing medical care including management of abnormal TSH and dyslipidemia.  Repeat LFTs and lipid panel recommended in 8-12 weeks since his statin has been started   5. Follow up with Dr Noyola in 2-3 weeks    Signed:  KENNETH Sanchez  4/10/2018  6:07 PM      EMR Dragon/Transcription:   \"Dictated utilizing Dragon dictation\".     "

## 2018-04-10 NOTE — CONSULTS
Patient Care Team:  Queenie Alas PA-C as PCP - General  Queenie Alas PA-C as PCP - Family Medicine    Chief complaint: Shortness of breath    Subjective   Ms. Delgado is a pleasant 89 yo female, with a PMH of HTN, new onset atrial fibrillation, CAD and valvular heart disease with acute exacerbation of heart failure.  We were consulted to evaluate her for cardiac surgery.  The patient was admitted to the hospital last week with exacerbated heart failure, new onset atrial fibrillation, SOB, severe pulmonary edema with hypoxia.  She was diuresed and her symptoms resolved.  She was sent home and returned for a cardiac cath today.  The information for this consult was difficult to obtain, most of the information was gathered by the chart and the patient's family who was at the bedside, she was anxious to go home and appeared to be aggravated with questioning.          History of Present Illness    Review of Systems   Constitutional: Positive for activity change and fatigue.   HENT: Negative.    Eyes: Negative.    Respiratory: Positive for cough and shortness of breath.    Cardiovascular: Positive for palpitations and leg swelling. Negative for chest pain.   Gastrointestinal: Negative.    Endocrine: Negative.    Genitourinary: Negative.    Musculoskeletal: Negative.    Skin: Negative.    Allergic/Immunologic: Negative.    Neurological: Negative.    Hematological: Negative.    Psychiatric/Behavioral: Negative.         Past Medical History:   Diagnosis Date   • Arthritis    • Basal cell adenocarcinoma     on her nose   • CHF (congestive heart failure)    • Hyperlipidemia    • Hypertension    • Onychomycosis    • Vitamin D deficiency disease      Past Surgical History:   Procedure Laterality Date   • APPENDECTOMY     • CARDIAC CATHETERIZATION N/A 4/9/2018    Procedure: Left Heart Cath;  Surgeon: Elias Noyola MD;  Location: [facility] INVASIVE LOCATION;  Service: Cardiovascular   • CARDIAC CATHETERIZATION N/A  4/9/2018    Procedure: Coronary angiography;  Surgeon: Elias Noyola MD;  Location: [facility] INVASIVE LOCATION;  Service: Cardiovascular   • CATARACT EXTRACTION     • CORNEAL TRANSPLANT     • HEMORROIDECTOMY     • TONSILLECTOMY       Family History   Problem Relation Age of Onset   • Cancer Sister    • Diabetes Maternal Grandmother    • Diabetes Paternal Grandfather      Social History   Substance Use Topics   • Smoking status: Never Smoker   • Smokeless tobacco: Never Used   • Alcohol use No     Prescriptions Prior to Admission   Medication Sig Dispense Refill Last Dose   • aspirin 81 MG EC tablet Take 1 tablet by mouth Daily. 30 tablet 0 4/8/2018 at Unknown time   • diclofenac (VOLTAREN) 1 % gel gel Apply 4 g topically 4 (Four) Times a Day As Needed (knee pain). Rub in well 3 tube 11 4/8/2018 at Unknown time   • digoxin (LANOXIN) 125 MCG tablet Take 1 tablet by mouth Daily. 30 tablet 0 4/8/2018 at Unknown time   • Furosemide (LASIX PO) Take  by mouth. Pt. reportedly   4/8/2018 at Unknown time   • metoprolol tartrate (LOPRESSOR) 50 MG tablet Take 1 tablet by mouth Every 12 (Twelve) Hours. 60 tablet 0 4/9/2018 at Unknown time   • omeprazole (PRILOSEC) 20 MG capsule Take 1 capsule by mouth Daily. For GERD 30 capsule 11 4/9/2018 at Unknown time   • ondansetron (ZOFRAN) 4 MG tablet Take 1 tablet by mouth Every 8 (Eight) Hours As Needed for Nausea or Vomiting. 1-2 tabs PO Q 8 hours PRN nausea 20 tablet 0 4/8/2018 at Unknown time   • Cholecalciferol (VITAMIN D3) 5000 UNITS tablet Take 5,000 Units by mouth daily.   Unknown at Unknown time       amLODIPine 2.5 mg Oral Q24H   aspirin 81 mg Oral Daily   atorvastatin 10 mg Oral Nightly   vitamin D3 5,000 Units Oral Daily   digoxin 125 mcg Oral Daily   enoxaparin 1 mg/kg Subcutaneous Q12H   furosemide 20 mg Oral Daily   metoprolol tartrate 50 mg Oral Q12H   pantoprazole 40 mg Oral QAM     Allergies:  Codeine    Objective      Vital Signs  Temp:  [97.6 °F (36.4  "°C)-98 °F (36.7 °C)] 98 °F (36.7 °C)  Heart Rate:  [61-85] 85  Resp:  [16-20] 18  BP: (109-142)/(69-98) 135/91    Flowsheet Rows    Flowsheet Row First Filed Value   Admission Height 160 cm (63\") Documented at 04/09/2018 1226   Admission Weight 72.6 kg (160 lb) Documented at 04/09/2018 1226        160 cm (62.99\")    Physical Exam   Constitutional: She is oriented to person, place, and time. She appears well-developed and well-nourished. No distress.   HENT:   Head: Normocephalic and atraumatic.   Nose: Nose normal.   Mouth/Throat: No oropharyngeal exudate.   Eyes: Conjunctivae and EOM are normal. Pupils are equal, round, and reactive to light. No scleral icterus.   Neck: Normal range of motion. Neck supple. No JVD present. No thyromegaly present.   Cardiovascular: Normal rate.    Murmur heard.  Pulmonary/Chest: Effort normal and breath sounds normal. No respiratory distress.   Abdominal: Soft. Bowel sounds are normal.   Musculoskeletal: Normal range of motion.   Neurological: She is alert and oriented to person, place, and time. She has normal reflexes.   Skin: Skin is warm and dry. There is pallor.   Psychiatric: She has a normal mood and affect. Her behavior is normal. Judgment and thought content normal.       Results Review:   Lab Results (last 24 hours)     Procedure Component Value Units Date/Time    Basic Metabolic Panel [[id number]]  (Abnormal) Collected:  04/10/18 0513    Specimen:  Blood Updated:  04/10/18 0604     Glucose 106 (H) mg/dL      BUN 16 mg/dL      Creatinine 0.78 mg/dL      Sodium 143 mmol/L      Potassium 3.4 (L) mmol/L      Chloride 103 mmol/L      CO2 27.2 mmol/L      Calcium 9.3 mg/dL      eGFR Non African Amer 70 mL/min/1.73      BUN/Creatinine Ratio 20.5     Anion Gap 12.8 mmol/L     Narrative:       The MDRD GFR formula is only valid for adults with stable renal function between ages 18 and 70.    Lipid Panel [[id number]]  (Abnormal) Collected:  04/10/18 0513    Specimen:  Blood Updated:  " 04/10/18 0604     Total Cholesterol 157 mg/dL      Triglycerides 113 mg/dL      HDL Cholesterol 35 (L) mg/dL      LDL Cholesterol  99 mg/dL      VLDL Cholesterol 22.6 mg/dL      LDL/HDL Ratio 2.84    Narrative:       Cholesterol Reference Ranges  (U.S. Department of Health and Human Services ATP III Classifications)    Desirable          <200 mg/dL  Borderline High    200-239 mg/dL  High Risk          >240 mg/dL      Triglyceride Reference Ranges  (U.S. Department of Health and Human Services ATP III Classifications)    Normal           <150 mg/dL  Borderline High  150-199 mg/dL  High             200-499 mg/dL  Very High        >500 mg/dL    HDL Reference Ranges  (U.S. Department of Health and Human Services ATP III Classifcations)    Low     <40 mg/dl (major risk factor for CHD)  High    >60 mg/dl ('negative' risk factor for CHD)        LDL Reference Ranges  (U.S. Department of Health and Human Services ATP III Classifcations)    Optimal          <100 mg/dL  Near Optimal     100-129 mg/dL  Borderline High  130-159 mg/dL  High             160-189 mg/dL  Very High        >189 mg/dL    Hemoglobin A1c [[id number]]  (Normal) Collected:  04/10/18 0513    Specimen:  Blood Updated:  04/10/18 0545     Hemoglobin A1C 5.51 %     Narrative:       Hemoglobin A1C Ranges:    Increased Risk for Diabetes  5.7% to 6.4%  Diabetes                     >= 6.5%  Diabetic Goal                < 7.0%    CBC (No Diff) [[id number]]  (Abnormal) Collected:  04/10/18 0513    Specimen:  Blood Updated:  04/10/18 0542     WBC 7.41 10*3/mm3      RBC 4.35 10*6/mm3      Hemoglobin 12.9 g/dL      Hematocrit 42.9 %      MCV 98.6 (H) fL      MCH 29.7 pg      MCHC 30.1 (L) g/dL      RDW 14.3 (H) %      RDW-SD 51.4 fl      MPV 12.1 (H) fL      Platelets 168 10*3/mm3               Assessment/Plan     Principal Problem:    Abnormal stress test  Active Problems:    CAD (coronary artery disease)      Assessment & Plan    -CAD  -Moderate-to-severe mitral valve  regurgitation is present  -Mild to moderate tricuspid valve regurgitation is present  -EF 50%  -Acute   -Atrial fibrillation- on digoxin and metoprolol - not on any long term AC has been on full dose lovenox  -Left BBB  -HLD  -HTN  -memory issues/mild dementia   -mobility issues/deconditioned- uses cane  -impaired ADLs - lives with sister    Discussed options with the patient. I do not believe she would be an optimal surgical candidate with her advanced age.  She does not want to proceed with cardiac surgery, the family also agreed.    Thank you for the opportunity in participating in the care of this patient.    KENNETH Cook  04/10/18  1:13 PM

## 2018-04-10 NOTE — PROGRESS NOTES
Discharge Planning Assessment  [facility]     Patient Name: Marie Delgado  MRN: [id number]  Today's Date: 4/10/2018    Admit Date: 4/9/2018          Discharge Needs Assessment     Row Name 04/10/18 2723       Living Environment    Lives With sibling(s);other (see comments)    Name(s) of Who Lives With Patient SisterLeigh ( Ana carrasco) [phone] and great nephew Partha Phillips ([phone])    Current Living Arrangements home/apartment/condo    Family Caregiver if Needed sibling(s)            Discharge Plan     Row Name 04/10/18 0477       Plan    Plan home     Plan Comments moon notice signed . Patient does not have a POA. Face sheet, PCP and pharmacy verified. Patient lives with her sister, Leigh Phillips ([phone]) and her great nephew Partha Phillips in a house . Patient uses a cane. No HH, No rehab. Patient and family may consider HH at discharge. Mosque list of HH agencies  left in room.  Family suggested [facility]. Called CCP SUSI Garcia to ask if patient may qualify for [facility] services. She is willing to talk with patient  and family  if needed. CCP to follow for decision regarding HH agencies.  Patient signed her moon notice. Sister and nephew, both in room. Copy left with patient. Copy  placed in HIM scan box........ ANTONIO Peterson    Final Discharge Disposition Code --        Destination     No service coordination in this encounter.      Durable Medical Equipment     No service coordination in this encounter.      Dialysis/Infusion     No service coordination in this encounter.      Home Medical Care     No service coordination in this encounter.      Social Care     No service coordination in this encounter.                Demographic Summary     Row Name 04/10/18 1639       General Information    Admission Type observation    Arrived From observation    Required Notices Provided Observation Status Notice    Referral Source admission list    Reason for Consult discharge planning     Preferred Language English     Used During This Interaction no            Functional Status     Row Name 04/10/18 1634       Functional Status, IADL    Medications assistive person    Meal Preparation assistive person    Housekeeping assistive person    Laundry assistive person    Shopping completely dependent       Employment/    Employment Status retired            Psychosocial    No documentation.           Abuse/Neglect    No documentation.           Legal    No documentation.           Substance Abuse    No documentation.           Patient Forms    No documentation.         ANTONIO Peterson

## 2018-04-10 NOTE — PROGRESS NOTES
-[facility]  Cardiology Progress Note    Patient Identification:  Name: Marie Delgado  Age: 88 y.o.  Sex: female  :  1929  MRN: [id number]                 Follow Up / Chief Complaint: CAD,  A. fib, CHF, hypertension, dyslipidemia       Interval History:  88-year-old female who recently presented with A. fib RVR of unknown duration, CHF.  Had abnormal stress test-> cath=> multivessel CAD      Subjective:  Denies chest pain, pressure or dizziness.  Denies shortness of breath or cough.    Objective:  Af  with cvr 80's  -140 / 90's  sats 93% on room air      Past Medical History:  Past Medical History:   Diagnosis Date   • Arthritis    • Basal cell adenocarcinoma     on her nose   • CHF (congestive heart failure)    • Hyperlipidemia    • Hypertension    • Onychomycosis    • Vitamin D deficiency disease      Past Surgical History:  Past Surgical History:   Procedure Laterality Date   • APPENDECTOMY     • CATARACT EXTRACTION     • CORNEAL TRANSPLANT     • HEMORROIDECTOMY     • TONSILLECTOMY          Social History:   Social History   Substance Use Topics   • Smoking status: Never Smoker   • Smokeless tobacco: Never Used   • Alcohol use No      Family History:  Family History   Problem Relation Age of Onset   • Cancer Sister    • Diabetes Maternal Grandmother    • Diabetes Paternal Grandfather           Allergies:  Allergies   Allergen Reactions   • Codeine Nausea Only     Scheduled Meds:    aspirin 81 mg Daily   vitamin D3 5,000 Units Daily   digoxin 125 mcg Daily   furosemide 20 mg Daily   metoprolol tartrate 50 mg Q12H   pantoprazole 40 mg QAM           INTAKE AND OUTPUT:    Intake/Output Summary (Last 24 hours) at 04/10/18 0703  Last data filed at 04/10/18 0741   Gross per 24 hour   Intake              540 ml   Output              150 ml   Net              390 ml       Review of Systems:   GI: No nausea or vomiting  Cardiac: No chest pain or tightness  Pulmonary: No shortness of breath or  cough    Constitutional:  Temp:  [97.6 °F (36.4 °C)-99.3 °F (37.4 °C)] 97.8 °F (36.6 °C)  Heart Rate:  [61-85] 85  Resp:  [16-20] 18  BP: (109-142)/(69-98) 134/96    Physical Exam:  General:  Appears in no acute distress  Eyes: PERTL,  HEENT:  No JVD.  Oral mucosa moist, no pallor or cyanosis  Respiratory: Respirations regular and unlabored at rest and room air. BBS with air entry in all fields. Minimal right basilar crackles, no rubs or wheezes auscultated  Cardiovascular: S1S2 irregular rate and rhythm. No murmur. No pretibial pitting edema  Gastrointestinal: Abdomen soft, non tender. Bowel sounds present.  Musculoskeletal: VÁSQEUZ x4. No abnormal movements  Extremities: Right radial procedure site without oozing, palpable hematoma or thrill.  Right brachial 1-to/right radial 1-right ulnar 1.  RUE pink, warm with one-2 second capillary refill No digital clubbing or cyanosis  Skin: Color pink. Skin warm and dry to touch.   Neuro: AAO x3 CN II-XII grossly intact  Psych: Mood and affect normal, pleasant and cooperative          Cardiographics  Telemetry:       ECG 4-10-18:       Echocardiogram:   · Moderate-to-severe mitral valve regurgitation is present  · Mild to moderate tricuspid valve regurgitation is present.  · Left atrial cavity size is mildly dilated.  · Calculated EF = 50%.  · There is no evidence of pericardial effusion.     Cath 4-9-18:  Conclusion        · Successful right and left coronary angiogram  · Normal left main  · Multiple proximal 90% stenosis of the left anterior descending artery with origin of the diagonal branch 90% stenosis  · Circumflex nondominant with 90% stenosis  · Dominant RCA with minimum irregularity         Lab Review     Results from last 7 days  Lab Units 04/04/18  1208 04/04/18  0848 04/04/18  0217   TROPONIN T ng/mL <0.010 <0.010 <0.010     Results from last 7 days  Lab Units 04/04/18  0217   MAGNESIUM mg/dL 1.8     Results from last 7 days  Lab Units 04/10/18  0513   SODIUM  "mmol/L 143   POTASSIUM mmol/L 3.4*   BUN mg/dL 16   CREATININE mg/dL 0.78   CALCIUM mg/dL 9.3     Results from last 7 days  Lab Units 04/10/18  0513 04/04/18  0217   WBC 10*3/mm3 7.41 7.90   HEMOGLOBIN g/dL 12.9 13.7   HEMATOCRIT % 42.9 44.1   PLATELETS 10*3/mm3 168 203             Assessment:  - CAD  - atrial fibrillation  - Elevated [id number]  - Non-rheumatic valvular heart disease   - hypokalemia  - Hypertension   - Dyslipidemia  - Abnormal TSH       Plan:  - Multivessel CAD - CTS evaluation pending.  No angina.  On aspirin, beta blocker. Add statin, Norvasc    - atrial fibrillation - rate controlled on Lopressor and digoxin.  Currently on Lovenox bridge therapy    - Elevated [id number] - on Lovenox bridge therapy    Continue optimized antianginal therapy pending CTS recommendations.  No evidence of fluid overload.  If patient opts for medical therapy, will start on Eliquis    Labs/tests ordered occasional adjustment      )4/10/2018  KENNETH Sanchez      EMR Dragon/Transcription:   \"Dictated utilizing Dragon dictation\".     "

## 2018-04-10 NOTE — PLAN OF CARE
Problem: Patient Care Overview  Goal: Plan of Care Review  Outcome: Ongoing (interventions implemented as appropriate)   04/10/18 0546   Coping/Psychosocial   Plan of Care Reviewed With patient   Plan of Care Review   Progress no change   OTHER   Outcome Summary S/p cath via right radial and right groin, sites soft, drsgs CDI, pt denies pain, discomfort or soa, needs frequent reminders not to use right hand, refuses to use cane with left hand, VSS     Goal: Discharge Needs Assessment  Outcome: Ongoing (interventions implemented as appropriate)      Problem: Cardiac Catheterization (Diagnostic/Interventional) (Adult)  Goal: Signs and Symptoms of Listed Potential Problems Will be Absent, Minimized or Managed (Cardiac Catheterization)  Outcome: Ongoing (interventions implemented as appropriate)      Problem: Fall Risk (Adult)  Goal: Identify Related Risk Factors and Signs and Symptoms  Outcome: Ongoing (interventions implemented as appropriate)      Problem: Skin Injury Risk (Adult)  Goal: Skin Health and Integrity  Outcome: Ongoing (interventions implemented as appropriate)

## 2018-04-11 NOTE — PROGRESS NOTES
Case Management Discharge Note    Final Note: Pt d/c home 4/10/18 with family transporting.  Family was given [facility] information prior to d/c by CSW.      Destination     No service coordination in this encounter.      Durable Medical Equipment     No service coordination in this encounter.      Dialysis/Infusion     No service coordination in this encounter.      Home Medical Care     No service coordination in this encounter.      Social Care     No service coordination in this encounter.        Other:  (PRIVATE AUTO)    Final Discharge Disposition Code: 01 - home or self-care

## 2018-04-19 PROBLEM — E03.9 ACQUIRED HYPOTHYROIDISM: Status: ACTIVE | Noted: 2018-01-01

## 2018-04-19 NOTE — PROGRESS NOTES
Transitional Care Follow Up Visit  Subjective     Marie Delgado is a 88 y.o. female who presents for a transitional care management visit.    Within 48 business hours after discharge our office contacted her via telephone to coordinate her care and needs.      I reviewed and discussed the details of that call along with the discharge summary, hospital problems, inpatient lab results, inpatient diagnostic studies, and consultation reports with Marie.     Current outpatient and discharge medications have been reconciled for the patient.    No flowsheet data found.  Risk for Readmission (LACE) Score: 4 (4/10/2018  6:00 AM)    History of Present Illness   Course During Hospital Stay:         HOSPITAL COURSE  This is a 88-year-old female was brought by her family because of her shortness breath and found to have a new onset atrial fibrillation along with heart failure.  She initially underwent echocardiogram which showed ejection fraction to be 50%.  However because of new onset of atrial fibrillation and CHF she underwent stress echo which shows inducible ischemia with ejection fraction of 28%.  Patient was started on beta blocker, aspirin and Lanoxin.  She is insistent that she wants to go home and come back for cardiac catheterization as an outpatient.  She has been educated about the risk factors and the family is also been notified.  Dr. Noyola has cleared her to be discharged and patient will follow-up with him for heart catheterization on Monday  To have cardiac cath Monday, 4/9/2018, afternoon  Come to Out patient surgery by Noon 4/9/18  Nothing to eat or drink after Midnight 4/8/2018  OK to have morning medications with sips of water on       Monday morning       Her Grandson Mick needs FMLA;  To help GM; may need to miss 1-2 days a month to get her to appointments;  See cannot drive    Then had heart cath  Discharge Diagnoses:   - Multivessel CAD - nonsurgical candidate  - atrial fibrillation - rate  control  - Elevated [id number] -> Eliquis  - EF 50%  - Non-rheumatic valvular heart disease -> mod-severe MR  - Hypertension   - Dyslipidemia  - Abnormal TSH  At time of discharge, patient's awake, alert resting quietly.  She denies any chest pain, tightness or shortness of breath.  I reviewed discharge instructions with the patient, her sister and nephew including: Changes to home medications, importance of compliance with medications and treatments, fluid restriction, daily weights, activity restrictions, cath site care and follow-up appointments.  Rationale for/use of sublingual nitroglycerin was reviewed. In the setting of atrial fibrillation, I also discussed with the patient/family the risks and benefits of anticoagulation therapy which include increased risk of bleeding and decreased risk of systemic embolization such as stroke.   She was advised to follow-up with her PCP in one week for ongoing medical care including management of abnormal TSH and dyslipidemia.  Repeat LFTs and lipid panel recommended in 8-12 weeks since his statin has been started. They were told to call our office tomorrow to set up a follow-up appointment 2-3 weeks  All questions were answered.  Patient/family verbalize understanding and agree with treatment plan    Has f/u May 9 with DR Noyola    I will check electrolytes today    I need to check lipids, thyroid, CMP in 8 weeks    The following portions of the patient's history were reviewed and updated as appropriate: allergies, current medications, past family history, past medical history, past social history, past surgical history and problem list.    Review of Systems   Constitutional: Positive for fatigue. Negative for activity change, appetite change and unexpected weight change.   HENT: Negative for nosebleeds and trouble swallowing.    Eyes: Negative for pain and visual disturbance.   Respiratory: Negative for chest tightness, shortness of breath and wheezing.     Cardiovascular: Negative for chest pain and palpitations.   Gastrointestinal: Negative for abdominal pain and blood in stool.   Endocrine: Negative.    Genitourinary: Negative for difficulty urinating and hematuria.   Musculoskeletal: Positive for arthralgias and joint swelling.   Skin: Negative for color change, rash and wound.   Allergic/Immunologic: Negative.    Neurological: Negative for syncope and speech difficulty.   Hematological: Negative for adenopathy.   Psychiatric/Behavioral: Negative for agitation and confusion.   All other systems reviewed and are negative.      Objective   Physical Exam   Constitutional: She is oriented to person, place, and time. She appears well-developed and well-nourished. No distress.   HENT:   Head: Normocephalic and atraumatic.   Eyes: Conjunctivae and EOM are normal. Pupils are equal, round, and reactive to light. Right eye exhibits no discharge. Left eye exhibits no discharge. No scleral icterus.   Neck: Normal range of motion. Neck supple. No tracheal deviation present. No thyromegaly present.   Cardiovascular: Normal rate, regular rhythm, normal heart sounds, intact distal pulses and normal pulses.  Exam reveals no gallop.    No murmur heard.  1+ pedal edema;  Venous stasis skin thickening tib/fib; brown   Pulmonary/Chest: Effort normal and breath sounds normal. No respiratory distress. She has no wheezes. She has no rales.   Abdominal: There is no tenderness.   Musculoskeletal: Normal range of motion. She exhibits deformity.   Right second toe overlaps first   Lymphadenopathy:     She has no cervical adenopathy.   Neurological: She is alert and oriented to person, place, and time.   Skin: Skin is warm. No rash noted. No erythema. No pallor.   Fungal toenails and thick, overgrown, irreg   Psychiatric: She has a normal mood and affect. Her behavior is normal. Judgment and thought content normal.   Nursing note and vitals reviewed.      Assessment/Plan   Marie was seen  today for follow-up and coronary artery disease.    Diagnoses and all orders for this visit:    Hospital discharge follow-up    History of heart disease    Venous insufficiency    Pedal edema    Personal history of atrial fibrillation    Hypokalemia    Mixed hyperlipidemia    Essential hypertension    Toenail deformity

## 2018-04-19 NOTE — PATIENT INSTRUCTIONS
Low glycemic index diet  Exercise 30 minutes most days of the week  Make sure you get results on any labs or tests we ordered today  We discussed medications and how to take them as prescribed  Sleep 6-8 hours each night if possible  If you have not signed up for [employer], please activate your code ASAP from your After Visit Summary today    LDL goal <100  LDL goal if heart disease <70  HDL goal >60  Triglyceride goal <150  BP goal =<130/80  Fasting glucose <100    Labs today and 8 weeks

## 2018-04-20 NOTE — TELEPHONE ENCOUNTER
----- Message from Ana BEST Trujillo sent at 4/20/2018  1:02 PM EDT -----  Regarding: PT HAS DIARRHEA WHAT CAN SHE TAKE? PLEASE ADVISE  KRISHNA GILLETTE (SISTER) [phone]        Informed pt to call pcp

## 2018-04-23 NOTE — H&P
[facility]  Cardiology History & Physical Note     Patient Identification:  Name: Marie Delgado  Age: 88 y.o.  Sex: female  :  1929  MRN: [id number]                    Follow Up / Chief Complaint: Abnormal EKG, A. fib, CHF, hypertension, dyslipidemia        Interval History:  88-year-old female with no known history of CAD or previous ischemic workup who presents with atrial fibrillation with RVR of unknown duration and subsequent heart failure with fluid overload.  She denies any angina.      Subjective:  Denies chest pain, tightness or pressure.  Denies shortness of breath.     Patient's sister and niece are at bedside.I reviewed stress test results with it's sensitivity/specificity of approximately 85% with the patient/family.  Treatment options including cardiac catheterization were reviewed. Diagnostic heart catheterization/PCI/stent procedure risks (including but not limited to: allergy,arrhythmia,bleeding,CVA,MI,renal dysfunction,emergent CABG and death) were explained to patient and all questions were answered. Patient voices understanding and wishes to proceed with diagnostic heart catheterization        Objective:     Afib with cvr 90's- 120,  no pauses  -115 / 80s     >5.9L net fluid deficit  BUN 20 (18), Cr 0.98  BNP  7057 -> 3314        Past Medical History:  Medical History        Past Medical History:   Diagnosis Date   • Arthritis     • Basal cell adenocarcinoma     • Hyperlipidemia     • Hypertension     • Onychomycosis     • Vitamin D deficiency disease           Past Surgical History:  Surgical History   Past Surgical History:   Procedure Laterality Date   • CATARACT EXTRACTION       • CORNEAL TRANSPLANT       • HEMORROIDECTOMY                Social History:        Social History   Substance Use Topics   • Smoking status: Never Smoker   • Smokeless tobacco: Never Used   • Alcohol use No      Family History:        Family History   Problem Relation Age of Onset   •  Cancer Sister     • Diabetes Maternal Grandmother     • Diabetes Paternal Grandfather              Allergies:       Allergies   Allergen Reactions   • Codeine Nausea Only      Scheduled Meds:     aspirin 81 mg Daily   digoxin 250 mcg Once   [START ON 4/7/2018] digoxin 125 mcg Daily   enoxaparin 1 mg/kg Q12H   furosemide 40 mg Q8H   metoprolol tartrate 50 mg Q12H   pantoprazole 40 mg QAM               INTAKE AND OUTPUT:     Intake/Output Summary (Last 24 hours) at 04/06/18 1433  Last data filed at 04/06/18 1200    Gross per 24 hour   Intake              430 ml   Output             2175 ml   Net            -1745 ml         Review of Systems:   GI: No nausea or vomiting  Cardiac: No chest pain or tightness  Pulmonary: Less short of breath today, no cough     Constitutional:  Temp:  [96.8 °F (36 °C)-98 °F (36.7 °C)] 97.8 °F (36.6 °C)  Heart Rate:  [] 119  Resp:  [20-44] 20  BP: (105-117)/(70-89) 107/89     Physical Exam:  General:  Awake, alert resting quietly. Appears in no acute distress  Eyes: PERTL,  HEENT:  No JVD.  Oral mucosa dry, no mucosal pallor or cyanosis  Respiratory: Respirations regular and unlabored at rest on room air. BBS with improved air entry in all fields. Few fine L>R basilar crackles auscultated  Cardiovascular: S1S2 irregular rate and rhythm. No rub or gallop.  No pretibial pitting edema  Gastrointestinal: Abdomen soft, flat, non tender. Bowel sounds present.   Musculoskeletal: VÁSQUEZ x4. No abnormal movements  Extremities: No digital clubbing or cyanosis  Skin: Color pink. Skin warm and dry to touch.   Neuro: AAO x3 CN II-XII grossly intact  Psych: Mood and affect normal, pleasant and cooperative              Cardiographics  Telemetry:           Echocardiogram:   · Moderate-to-severe mitral valve regurgitation is present  · Mild to moderate tricuspid valve regurgitation is present.  · Left atrial cavity size is mildly dilated.  · Calculated EF = 50%.  · There is no evidence of pericardial  "effusion.     Lab Review      Results from last 7 days  Lab Units 04/04/18  1208 04/04/18  0848 04/04/18  0217   TROPONIN T ng/mL <0.010 <0.010 <0.010         Results from last 7 days  Lab Units 04/04/18  0217   MAGNESIUM mg/dL 1.8         Results from last 7 days  Lab Units 04/06/18  0358   SODIUM mmol/L 144   POTASSIUM mmol/L 3.4*   BUN mg/dL 20   CREATININE mg/dL 0.98   CALCIUM mg/dL 9.3         Results from last 7 days  Lab Units 04/04/18  0217   WBC 10*3/mm3 7.90   HEMOGLOBIN g/dL 13.7   HEMATOCRIT % 44.1   PLATELETS 10*3/mm3 203                Assessment:  - new onset atrial fibrillation-unknown duration  - Elevated [id number]  - Non-rheumatic valvular heart disease   - Acute heart failure/fluid overload  - hypokalemia  - Hypertension   - Dyslipidemia  - Abnormal TSH           Plan:  - new onset atrial fibrillation rvr - unknown duration. VR running  on Lopressor     - elevated [id number] -> 4. Currently on full dose Lovenox..  Will need long-term anticoagulation following cardia cath     - Acute heart failure -> fluid overload.  Clinically improved  MI ruled out, but stress test markedly abnormal.  Patient is agreeable to proceed with cardiac catheterization on Monday.  EF 50%      - Non-rheumatic valvular heart disease - mod-severe MR, mod TR.  Good diuretic response and clinically improved on IV Lasix and Lopressor.     - Hypertension - 110-117/70s-80's      Stress test markedly abnormal.  Results discussed with patient, sister and niece.  Patient is agreeable to cardiac catheterization.  Will be scheduled for  Monday.  Adequately diuresed-> change to oral Lasix tomorrow.  Add digoxin to improve ventricular rate Continue full dose Lovenox.       Labs/tests ordered: medictation adjustment,  BNP, BMP        )4/6/2018  Elias Noyola MD      Note reviewed. No changes to  H&P    EMR Dragon/Transcription:   \"Dictated utilizing Dragon dictation\".        "

## 2018-05-01 NOTE — TELEPHONE ENCOUNTER
HR low to mid 40s  /60's     Concerned that HR is too low. C/o feeling occassional dizziness. Was supposed to get metoprolol this evening instructed to hold tonight's dosage and await further instruction from Dr LC Rosas

## 2018-05-07 NOTE — TELEPHONE ENCOUNTER
Patient nephew reports large bruise on chest going into shoulder. Denies any injury. Was started on eliquis 1month ago while in the hospital.  Denies any dark stools or blood in stool or any other signs of bleeding.

## 2018-05-09 NOTE — TELEPHONE ENCOUNTER
Per Dr Noyola we don't stop blood thinners for bruising due to risk, unless other symptoms occur blood in stool, urine, etc.

## 2018-05-09 NOTE — PROGRESS NOTES
Subjective:       Marie Delgado is a 88 y.o. female who here for follow up    CC  HOSP FOLLOW UP    LT HAND AND HEAD SHAKING    LEG SWELLING  HPI   88-year-old female with multivessel coronary artery disease not surgical candidate, here for the follow-up has been complaining of shortness of breath on minimum exertion along with the bilateral leg swelling mild to moderate in intensity without any chest pains or tightness in chest  Discharge Diagnoses:   - Multivessel CAD - nonsurgical candidate  - atrial fibrillation - rate control  - Elevated [id number] -> Eliquis  - EF 50%  - Non-rheumatic valvular heart disease -> mod-severe MR  - Hypertension   - Dyslipidemia  - Abnormal TSH     Problem List Items Addressed This Visit     None      Visit Diagnoses    None.     Component Results     Component Value Flag Ref Range Units Status   Glucose 96   65 - 99 mg/dL Final   BUN 19   8 - 23 mg/dL Final   Creatinine 1.02   H  0.57 - 1.00 mg/dL Final   eGFR Non African Am 51   L  >60 mL/min/1.73 Final   Comment:   The MDRD GFR formula is only valid for adults with stable   renal function between ages 18 and 70.    eGFR African Am 62   >60 mL/min/1.73 Final   BUN/Creatinine Ratio 18.6   7.0 - 25.0  Final   Sodium 146   H  136 - 145 mmol/L Final   Potassium 3.6   3.5 - 5.2 mmol/L Final   Chloride 106   98 - 107 mmol/L Final   Total CO2 24.4   22.0 - 29.0 mmol/L Final   Calcium 9.7            .    The following portions of the patient's history were reviewed and updated as appropriate: allergies, current medications, past family history, past medical history, past social history, past surgical history and problem list.    Past Medical History:   Diagnosis Date   • Arthritis    • Basal cell adenocarcinoma     on her nose   • CHF (congestive heart failure)    • Hyperlipidemia    • Hypertension    • Onychomycosis    • Vitamin D deficiency disease     reports that she has never smoked. She has never used smokeless tobacco. She reports  "that she does not drink alcohol or use drugs.  Family History   Problem Relation Age of Onset   • Cancer Sister    • Diabetes Maternal Grandmother    • Diabetes Paternal Grandfather        Review of Systems  Constitutional: No wt loss, fever, fatigue  Gastrointestinal: No nausea, abdominal pain  Behavioral/Psych: No insomnia or anxiety   Cardiovascular Leg swelling as well as shaking  Objective:       Physical Exam           Physical Exam  /77 (BP Location: Left arm, Patient Position: Sitting)   Pulse 98   Ht 157.5 cm (62\")   Wt 69.4 kg (153 lb)   BMI 27.98 kg/m²     General appearance: NAD, conversant   Eyes: anicteric sclerae, moist conjunctivae; no lid-lag; PERRLA   HENT: Atraumatic; oropharynx clear with moist mucous membranes and no mucosal ulcerations;  normal hard and soft palate   Neck: Trachea midline; FROM, supple, no thyromegaly or lymphadenopathy   Lungs: CTA, with normal respiratory effort and no intercostal retractions   CV: S1-S2 regular, sys murmurs, no rub, no gallop   Abdomen: Soft, non-tender; no masses or HSM   Extremities: No peripheral edema or extremity lymphadenopathy  Skin: Normal temperature, turgor and texture; no rash, ulcers or subcutaneous nodules   Psych: Appropriate affect, alert and oriented to person, place and time           Cardiographics  @Procedures    Echocardiogram:        Current Outpatient Prescriptions:   •  amLODIPine (NORVASC) 2.5 MG tablet, Take 1 tablet by mouth Daily., Disp: 30 tablet, Rfl: 2  •  apixaban (ELIQUIS) 5 MG tablet tablet, Take 1 tablet by mouth Every 12 (Twelve) Hours., Disp: 60 tablet, Rfl: 2  •  atorvastatin (LIPITOR) 10 MG tablet, Take 1 tablet by mouth Every Night., Disp: 30 tablet, Rfl: 1  •  Cholecalciferol (VITAMIN D3) 5000 UNITS tablet, Take 5,000 Units by mouth daily., Disp: , Rfl:   •  furosemide (LASIX) 20 MG tablet, Take 1 tablet by mouth Daily. Pt. reportedly, Disp: 30 tablet, Rfl: 2  •  metoprolol succinate XL (TOPROL-XL) 25 MG 24 " hr tablet, Take 1 tablet by mouth Daily. (Patient taking differently: Take 12.5 mg by mouth 2 (Two) Times a Day.), Disp: 30 tablet, Rfl: 3  •  nitroglycerin (NITROSTAT) 0.4 MG SL tablet, 1 under the tongue as needed for chest pain, may repeat q5mins for up three doses, Disp: 25 tablet, Rfl: 11  •  omeprazole (PRILOSEC) 20 MG capsule, Take 1 capsule by mouth Daily. For GERD, Disp: 30 capsule, Rfl: 11    Current Facility-Administered Medications:   •  nitroglycerin (NITROSTAT) SL tablet 0.4 mg, 0.4 mg, Sublingual, Q5 Min PRN, Criss Leger, APRN   Assessment:        Patient Active Problem List   Diagnosis   • Acid reflux   • Hypertension   • Arthritis   • Hyperlipidemia   • Venous insufficiency   • Onychomycosis   • Pedal edema   • Vitamin D deficiency disease   • New onset atrial fibrillation   • Acute congestive heart failure   • Acute respiratory failure with hypoxia   • Pleural effusion, bilateral   • Hypokalemia   • Abnormal stress test   • CAD (coronary artery disease)   • Acquired hypothyroidism               Plan:            ICD-10-CM ICD-9-CM   1. Essential hypertension I10 401.9   2. Mixed hyperlipidemia E78.2 272.2   3. Coronary artery disease involving native coronary artery of native heart without angina pectoris I25.10 414.01     1. Essential hypertension  Blood pressure is running on the low 1    2. Mixed hyperlipidemia  Counseling    3. Coronary artery disease involving native coronary artery of native heart without angina pectoris  Significant coronary artery disease no angina pectoris       DC METOPROLOL AND START INDERAL  20 MG PO BID, due to the shakiness of the hands as well as had    MAY REDUCE LASIX TO 20 MG HALF DAILY    SEE IN 3 MONTHS    Pros and cons of this new medication / change medication has been explained to  the patient    Possible side effects has been explained    Associated need of the blood  Work has been explained    Need for the compliance of the medication has been  explained        COUNSELING:    Marie oR was given to patient for the following topics: diagnostic results, risk factor reductions, impressions, risks and benefits of treatment options and importance of treatment compliance .       SMOKING COUNSELING:    Counseling given: Not Answered      EMR Dragon/Transcription disclaimer:   Much of this encounter note is an electronic transcription/translation of spoken language to printed text. The electronic translation of spoken language may permit erroneous, or at times, nonsensical words or phrases to be inadvertently transcribed; Although I have reviewed the note for such errors, some may still exist.

## 2018-06-05 NOTE — TELEPHONE ENCOUNTER
Pt's sister called about a bruise on her shoulder and arm. She did not injury it in any way. She is on blood thinner and the cardio told her to have the PCP look at this. The only appt available is . Please advise if this is okay.

## 2018-06-07 NOTE — PATIENT INSTRUCTIONS
Low glycemic index diet  Exercise 30 minutes most days of the week  Make sure you get results on any labs or tests we ordered today  We discussed medications and how to take them as prescribed  Sleep 6-8 hours each night if possible  If you have not signed up for [employer], please activate your code ASAP from your After Visit Summary today    LDL goal <100  LDL goal if heart disease <70  HDL goal >60  Triglyceride goal <150  BP goal =<130/80  Fasting glucose <100  Warned patient that this medication can cause drowsiness and impair them operating machinery, including driving a car.  Caution is advised.

## 2018-06-07 NOTE — PROGRESS NOTES
Subjective   Marie Delgado is a 89 y.o. female.     History of Present Illness   Shoulder Pain  Patient complains of right shoulder pain. The symptoms began a week ago.  Pain is a result of no known event. Pain is located clavicle, deltoid, biceps, triceps. region. Discomfort is described as aching and throbbing. Symptoms are exacerbated by: laying on that shoulder, pressure applied to area and movement. Evaluation to date: none. Therapy to date includes: nothing specific.  Had bruising on shoulder and is on Eliquis    X-Ray  Interpretation report in house X-rays that I personally viewed    Relevant Clinical Issues/Diagnoses/Indications:  Pain and bruise        Clinical Findings:  No fx OA changes          Comparative Data:  none          Date of Previous X-ray:  Change on current X-ray  The patient has read and signed the [facility] Controlled Substance Contract.  I will continue to see patient for regular follow up appointments.  They are well controlled on their medication.  CARMITA has been reviewed by me and is updated every 3 months. The patient is aware of the potential for addiction and dependence.      Will Rx Ultram  The following portions of the patient's history were reviewed and updated as appropriate: allergies, current medications, past family history, past medical history, past social history, past surgical history and problem list.    Review of Systems   Constitutional: Positive for appetite change. Negative for activity change and unexpected weight change.   HENT: Positive for dental problem, postnasal drip and rhinorrhea. Negative for nosebleeds and trouble swallowing.    Eyes: Positive for itching. Negative for pain and visual disturbance.   Respiratory: Negative for chest tightness, shortness of breath and wheezing.    Cardiovascular: Negative for chest pain and palpitations.   Gastrointestinal: Negative for abdominal pain and blood in stool.   Endocrine: Negative.    Genitourinary: Negative  for difficulty urinating and hematuria.   Musculoskeletal: Positive for arthralgias. Negative for joint swelling.   Skin: Negative for color change and rash.   Allergic/Immunologic: Negative.    Neurological: Negative for syncope and speech difficulty.   Hematological: Negative for adenopathy.   Psychiatric/Behavioral: Positive for agitation and decreased concentration. Negative for confusion.   All other systems reviewed and are negative.      Objective   Physical Exam   Constitutional: She is oriented to person, place, and time. She appears well-developed and well-nourished. No distress.   HENT:   Head: Normocephalic and atraumatic.   Eyes: Conjunctivae and EOM are normal. Pupils are equal, round, and reactive to light. Right eye exhibits no discharge. Left eye exhibits no discharge. No scleral icterus.   Neck: Normal range of motion. Neck supple. No tracheal deviation present.   Cardiovascular: Normal rate, regular rhythm, normal heart sounds and normal pulses.  Exam reveals no gallop.    No murmur heard.  1+ pedal edema;  Venous stasis skin thickening tib/fib; brown   Pulmonary/Chest: Effort normal and breath sounds normal. No respiratory distress. She has no wheezes. She has no rales.   Abdominal: There is no tenderness.   Musculoskeletal: Normal range of motion. She exhibits tenderness (sore detoid and humerus; worse with ROM; ?bursa swellin right deltoid) and deformity.   Right second toe overlaps first   Neurological: She is alert and oriented to person, place, and time.   Skin: Skin is warm. No rash noted. No erythema. No pallor.   Fungal toenails and thick, overgrown, irreg   Psychiatric: Her behavior is normal. Judgment and thought content normal.   Nursing note and vitals reviewed.      Assessment/Plan   Marie was seen today for arm pain.    Diagnoses and all orders for this visit:    Acute pain of right shoulder

## 2018-06-15 NOTE — TELEPHONE ENCOUNTER
Mrs Phillips states pt does not need to go to ER due to this has been going on for a while and thinks its due to medication.  Pt will be seen in office on Monday 6/18

## 2018-06-15 NOTE — TELEPHONE ENCOUNTER
Pt daughter called states pt still having episodes of her head and hands shaking.  At last office visit Dr PLATT switched her from metoprolol to inderal due to shaking.    No other symptoms.    I will discuss with Dr PLATT on Monday when he returns to office. Instructed Mrs. Phillips if Pt was to get worse or had others symptoms to have her seen in ER.

## 2018-06-27 NOTE — TELEPHONE ENCOUNTER
"Pt nephew called states pt was having \" shaking \"spells again.   After discussing with Mrs. Phillips she will take pt to be seen in ER.   "

## 2018-08-18 NOTE — ED PROVIDER NOTES
EMERGENCY DEPARTMENT ENCOUNTER    Room Number:  18/18  Date seen:  8/18/2018  Time seen: 11:46 PM  PCP: Queenie Alas PA-C   Cardiologist- Dr Noyola  History provided by- patient, family    HPI:  Chief complaint: dyspnea   Context:Marie Delgado is a 89 y.o. female who states that she has hx of CAD, atrial fibrillation (on eliquis), and CHF (on lasix). At baseline, she is not on any supplemental O2. She presents to the ED with c/o dyspnea that started this morning. It is exacerbated by exertion. She denies chest pain, cough, acute change in chronic BLE swelling, abd pain, N/V/D, abd swelling, bladder dysfunction, bowel dysfunction, fevers, chills, and recent medication changes. Per family, in 6/2018, pt followed up with Dr Noyola (cardiologist) for her CAD which is being medically managed. Pt has no other complaints at this time.    Timing: intermittent  Duration: started this morning  Location: respiratory   Radiation: none  Quality: shortness of breath  Intensity/Severity: moderate  Associated Symptoms: none  Aggravating Factors: exertion  Alleviating Factors: rest  Previous Episodes: none mentioned  Treatment before arrival: none mentioned    MEDICAL RECORD REVIEW  Cardiac cath report from 4/9/18-   · Successful right and left coronary angiogram  · Normal left main  · Multiple proximal 90% stenosis of the left anterior descending artery with origin of the diagonal branch 90% stenosis  · Circumflex nondominant with 90% stenosis  · Dominant RCA with minimum irregularity    Pt followed up with Dr Noyola (cardiologist) on 6/18/18 for CAD.     4 months ago, BNP was 2068.     ALLERGIES  Codeine    PAST MEDICAL HISTORY  Active Ambulatory Problems     Diagnosis Date Noted   • Acid reflux 02/10/2016   • Hypertension 02/10/2016   • Arthritis 02/10/2016   • Hyperlipidemia 02/10/2016   • Venous insufficiency 08/17/2016   • Onychomycosis 08/17/2016   • Pedal edema 08/17/2016   • Vitamin D deficiency disease  07/25/2017   • New onset atrial fibrillation (CMS/[facility]) 04/04/2018   • Acute congestive heart failure (CMS/[facility]) 04/04/2018   • Acute respiratory failure with hypoxia (CMS/[facility]) 04/04/2018   • Pleural effusion, bilateral 04/04/2018   • Hypokalemia 04/05/2018   • Abnormal stress test 04/04/2018   • CAD (coronary artery disease) 04/09/2018   • Acquired hypothyroidism 04/19/2018     Resolved Ambulatory Problems     Diagnosis Date Noted   • No Resolved Ambulatory Problems     Past Medical History:   Diagnosis Date   • Arthritis    • Basal cell adenocarcinoma    • CHF (congestive heart failure) (CMS/[facility])    • Hyperlipidemia    • Hypertension    • Onychomycosis    • Vitamin D deficiency disease        PAST SURGICAL HISTORY  Past Surgical History:   Procedure Laterality Date   • APPENDECTOMY     • CARDIAC CATHETERIZATION N/A 4/9/2018    Procedure: Left Heart Cath;  Surgeon: Elias Noyola MD;  Location:  [facility];  Service: Cardiovascular   • CARDIAC CATHETERIZATION N/A 4/9/2018    Procedure: Coronary angiography;  Surgeon: Elias Noyola MD;  Location:  [facility];  Service: Cardiovascular   • CATARACT EXTRACTION     • CORNEAL TRANSPLANT     • HEMORROIDECTOMY     • TONSILLECTOMY         FAMILY HISTORY  Family History   Problem Relation Age of Onset   • Cancer Sister    • Diabetes Maternal Grandmother    • Diabetes Paternal Grandfather        SOCIAL HISTORY  Social History     Social History   • Marital status: Single     Spouse name: N/A   • Number of children: N/A   • Years of education: N/A     Occupational History   • Not on file.     Social History Main Topics   • Smoking status: Never Smoker   • Smokeless tobacco: Never Used   • Alcohol use No   • Drug use: No   • Sexual activity: Defer     Other Topics Concern   • Not on file     Social History Narrative   • No narrative on file       REVIEW OF SYSTEMS  Review of Systems   Constitutional: Negative for chills and  fever.   HENT: Negative for congestion, rhinorrhea and sore throat.    Eyes: Negative for pain.   Respiratory: Positive for shortness of breath. Negative for cough.    Cardiovascular: Negative for chest pain and palpitations. Leg swelling: chronic BLE swelling, no acute change.   Gastrointestinal: Negative for abdominal pain, diarrhea, nausea and vomiting.   Endocrine: Negative.    Genitourinary: Negative for difficulty urinating.   Musculoskeletal: Negative for myalgias.   Skin: Negative.    Neurological: Negative for speech difficulty, weakness, numbness and headaches.   Psychiatric/Behavioral: Negative.        PHYSICAL EXAM  ED Triage Vitals   Temp Heart Rate Resp BP SpO2   08/17/18 2327 08/17/18 2327 08/17/18 2327 08/18/18 0013 08/17/18 2327   98.1 °F (36.7 °C) 92 19 104/83 94 %      Temp src Heart Rate Source Patient Position BP Location FiO2 (%)   08/17/18 2327 -- 08/18/18 0013 08/18/18 0013 --   Tympanic  Lying Left arm      Physical Exam   Constitutional: She is oriented to person, place, and time. No distress.   HENT:   Head: Normocephalic.   Mouth/Throat: Mucous membranes are normal.   Eyes: Pupils are equal, round, and reactive to light. EOM are normal.   Neck: Normal range of motion. Neck supple.   Cardiovascular: Normal rate and normal heart sounds.  An irregularly irregular rhythm present.   Pulmonary/Chest: Effort normal and breath sounds normal. No respiratory distress. She has no decreased breath sounds. She has no wheezes. She has no rhonchi.   Crackles in bilateral lung bases   Abdominal: Soft. There is no tenderness. There is no rebound and no guarding.   Very rounded abdomen   Musculoskeletal: Normal range of motion. She exhibits edema (1+ BLE edema).   Neurological: She is alert and oriented to person, place, and time. She has normal sensation.   Skin: Skin is warm and dry.   Psychiatric: Mood and affect normal.   Nursing note and vitals reviewed.      LAB RESULTS  Recent Results (from the past  24 hour(s))   Comprehensive Metabolic Panel    Collection Time: 08/18/18 12:11 AM   Result Value Ref Range    Glucose 105 (H) 65 - 99 mg/dL    BUN 10 8 - 23 mg/dL    Creatinine 0.88 0.57 - 1.00 mg/dL    Sodium 143 136 - 145 mmol/L    Potassium 2.9 (L) 3.5 - 5.2 mmol/L    Chloride 101 98 - 107 mmol/L    CO2 29.0 22.0 - 29.0 mmol/L    Calcium 8.6 8.6 - 10.5 mg/dL    Total Protein 6.4 6.0 - 8.5 g/dL    Albumin 3.60 3.50 - 5.20 g/dL    ALT (SGPT) 7 1 - 33 U/L    AST (SGOT) 8 1 - 32 U/L    Alkaline Phosphatase 92 39 - 117 U/L    Total Bilirubin 0.6 0.1 - 1.2 mg/dL    eGFR Non African Amer 61 >60 mL/min/1.73    Globulin 2.8 gm/dL    A/G Ratio 1.3 g/dL    BUN/Creatinine Ratio 11.4 7.0 - 25.0    Anion Gap 13.0 mmol/L   Urinalysis With Microscopic If Indicated (No Culture) - Urine, Clean Catch    Collection Time: 08/18/18 12:11 AM   Result Value Ref Range    Color, UA Yellow Yellow, Straw    Appearance, UA Cloudy (A) Clear    pH, UA 6.5 5.0 - 8.0    Specific Gravity, UA 1.009 1.005 - 1.030    Glucose, UA Negative Negative    Ketones, UA Negative Negative    Bilirubin, UA Negative Negative    Blood, UA Negative Negative    Protein, UA Negative Negative    Leuk Esterase, UA Large (3+) (A) Negative    Nitrite, UA Negative Negative    Urobilinogen, UA 1.0 E.U./dL 0.2 - 1.0 E.U./dL   BNP    Collection Time: 08/18/18 12:11 AM   Result Value Ref Range    proBNP 2,043.0 (H) 0.0-1,800.0 pg/mL   Troponin    Collection Time: 08/18/18 12:11 AM   Result Value Ref Range    Troponin T <0.010 0.000 - 0.030 ng/mL   CBC Auto Differential    Collection Time: 08/18/18 12:11 AM   Result Value Ref Range    WBC 7.52 4.50 - 10.70 10*3/mm3    RBC 4.29 3.90 - 5.20 10*6/mm3    Hemoglobin 13.1 11.9 - 15.5 g/dL    Hematocrit 41.4 35.6 - 45.5 %    MCV 96.5 80.5 - 98.2 fL    MCH 30.5 26.9 - 32.0 pg    MCHC 31.6 (L) 32.4 - 36.3 g/dL    RDW 12.6 11.7 - 13.0 %    RDW-SD 44.1 37.0 - 54.0 fl    MPV 11.5 6.0 - 12.0 fL    Platelets 198 140 - 500 10*3/mm3     Neutrophil % 48.2 42.7 - 76.0 %    Lymphocyte % 42.0 19.6 - 45.3 %    Monocyte % 7.8 5.0 - 12.0 %    Eosinophil % 1.5 0.3 - 6.2 %    Basophil % 0.5 0.0 - 1.5 %    Immature Grans % 0.0 0.0 - 0.5 %    Neutrophils, Absolute 3.62 1.90 - 8.10 10*3/mm3    Lymphocytes, Absolute 3.16 0.90 - 4.80 10*3/mm3    Monocytes, Absolute 0.59 0.20 - 1.20 10*3/mm3    Eosinophils, Absolute 0.11 0.00 - 0.70 10*3/mm3    Basophils, Absolute 0.04 0.00 - 0.20 10*3/mm3    Immature Grans, Absolute 0.00 0.00 - 0.03 10*3/mm3   Urinalysis, Microscopic Only - Urine, Clean Catch    Collection Time: 08/18/18 12:11 AM   Result Value Ref Range    RBC, UA 3-5 (A) None Seen, 0-2 /HPF    WBC, UA Too Numerous to Count (A) None Seen, 0-2 /HPF    Bacteria, UA None Seen None Seen /HPF    Squamous Epithelial Cells, UA 3-6 (A) None Seen, 0-2 /HPF    Hyaline Casts, UA 3-6 None Seen /LPF    Methodology Automated Microscopy        I ordered the above labs and reviewed the results    RADIOLOGY  XR Chest 2 View (Preliminary result)   Result time 08/18/18 00:53:07   Preliminary result by Supa Jeff MD (08/18/18 00:53:07)                Impression:    No acute findings.                   Narrative:    CHEST PA AND LATERAL.     HISTORY: Worsening shortness of breath.     COMPARISON: 4/4/2018.     FINDINGS:  Cardiomediastinal silhouette is within normal limits. Tortuous thoracic  aorta. Elevation of the right hemidiaphragm.     There is no consolidation or effusion. Minimal scarring in the right mid  to lower lung zone.                      I ordered the above noted radiological studies and reviewed the images on the PACS system.        MEDICATIONS GIVEN IN ER  Medications   sodium chloride 0.9 % flush 10 mL (not administered)   potassium chloride (MICRO-K) CR capsule 40 mEq (40 mEq Oral Given 8/18/18 0108)       EKG  Interpreted by ED Physician (see Dr Geiger's note for EKG interpretation)     PROCEDURES  Procedures    COURSE & MEDICAL DECISION MAKING  Pertinent  Labs and Imaging studies that were ordered and reviewed are noted above.  Results were reviewed/discussed with the patient and they were also made aware of online access.  Pt also made aware that some labs, such as cultures, will not be resulted during ER visit and follow up with PMD is necessary.     PROGRESS AND CONSULTS    Progress Notes:       2353- Ordered CXR, blood work, UA, BNP, troponin, and EKG for further evaluation.     0048- Potassium is decreased at 2.9. Ordered KCl for hypokalemia.     0101- Reviewed pt's history and workup with Dr. Geiger.  After a bedside evaluation, Dr. Geiger agrees with the plan of care.    0105- Rechecked pt. She is resting comfortably and is in no acute distress/no respiratory distress. Discussed with pt and family about all pertinent results including normal WBC count, decreased potassium of 2.9, elevated but unchanged BNP compared to previous, normal renal function, negative troponin, stable EKG findings, and CXR findings (no acute process). Informed pt of plan to prescribe potassium supplement for hypokalemia. Instructed to f/u closely with PMD for recheck. RTER warnings given. Pt and family understand and agree with plan. Addressed all questions.    The patient's history, physical exam, and lab findings were discussed with the physician, who also performed a face to face history and physical exam.  I discussed all results and noted any abnormalities with patient.  Discussed absoute need to recheck abnormalities with their family physician.  I answered any of the patient's questions.  Discussed plan for discharge, as there is no emergent indication for admission.  Pt is agreeable and understands need for follow up and repeat testing.  Pt is aware that discharge does not mean that nothing is wrong but it indicates no emergency is present and they must continue care with their family physician.  Pt is discharged with instructions to follow up with primary care doctor to have  "their blood pressure rechecked.       Disposition vitals:  /83 (BP Location: Left arm, Patient Position: Lying)   Pulse 92   Temp 98.1 °F (36.7 °C) (Tympanic)   Resp 19   Ht 162.6 cm (64\")   Wt 61.2 kg (135 lb)   SpO2 94%   BMI 23.17 kg/m²       DIAGNOSIS  Final diagnoses:   Dyspnea, unspecified type   Hypokalemia       FOLLOW UP   Queenie Alas, PALeahC  [address] [address]  [phone]    Schedule an appointment as soon as possible for a visit in 3 days        RX     Medication List      New Prescriptions    potassium chloride 20 MEQ CR tablet  Commonly known as:  [id number],[id number]  Take 1 tablet by mouth Daily for 5 days.            Documentation assistance provided by prakash Wiggins for KENNETH Traylor.  Information recorded by the scribe was done at my direction and has been verified and validated by me.  Electronically signed by Niki Wiggins on 8/17/2018 at time 1:08 AM       Niki Wiggins  08/18/18 0113       Geeta Carballo APRN  08/18/18 0215    "

## 2018-08-18 NOTE — TELEPHONE ENCOUNTER
Saw her ER notes and take the KCl and fill my 10 meq KCl after finish ER meq; BMP in one week ordered

## 2018-08-18 NOTE — ED PROVIDER NOTES
Pt presents to the ED c/o SOA. Pt's family states pt has tremors and several other sx's every day. Pt takes Lasix. Pt denies abd pain. On exam, lungs clear and unremarkable, heart is irregular, no leg swelling.     Notified pt of unremarkable EKG, CXR, and low potassium levels. Discussed plan to discharge pt home after potassium pills administered. Pt understands and agrees with the plan, all questions answered.      Attestation:  The STALIN and I have discussed this patient's history, physical exam, and treatment plan.  I have reviewed the documentation and personally had a face to face interaction with the patient. I affirm the documentation and agree with the treatment and plan.  The attached note describes my personal findings.      Documentation assistance provided by prakash Higgins for Dr. Geiger. Information recorded by the magane was done at my direction and has been verified and validated by me.     Corinne Hernandez  08/18/18 0112       Esdras Geiger MD  08/20/18 [id number]

## 2018-09-10 NOTE — TELEPHONE ENCOUNTER
Pt sister is calling concern about pt mental status. She states that she is telling her that someone is coming to get her and now that she today her sister is will be worse. I told her to take her to the ER. She states that she does not think that she will go. I also suggest that she call Dr Vernon fields

## 2018-09-17 PROBLEM — R31.21 ASYMPTOMATIC MICROSCOPIC HEMATURIA: Status: ACTIVE | Noted: 2018-01-01

## 2018-09-17 NOTE — PATIENT INSTRUCTIONS
Low glycemic index diet  Exercise 30 minutes most days of the week  Make sure you get results on any labs or tests we ordered today  We discussed medications and how to take them as prescribed  Sleep 6-8 hours each night if possible  If you have not signed up for [employer], please activate your code ASAP from your After Visit Summary today    LDL goal <100  LDL goal if heart disease <70  HDL goal >60  Triglyceride goal <150  BP goal =<130/80  Fasting glucose <100

## 2018-09-17 NOTE — PROGRESS NOTES
Subjective   Marie Delgado is a 89 y.o. female.     History of Present Illness   Marie Delgado 89 y.o. female who presents today for routine follow up check and medication refills.  she has a history of   Patient Active Problem List   Diagnosis   • Acid reflux   • Hypertension   • Arthritis   • Hyperlipidemia   • Venous insufficiency   • Onychomycosis   • Pedal edema   • Vitamin D deficiency disease   • New onset atrial fibrillation (CMS/HCC)   • Acute congestive heart failure (CMS/HCC)   • Acute respiratory failure with hypoxia (CMS/HCC)   • Pleural effusion, bilateral   • Hypokalemia   • Abnormal stress test   • CAD (coronary artery disease)   • Acquired hypothyroidism   • Asymptomatic microscopic hematuria   .  Since the last visit, she has overall felt tired.  She has trouble eating and swallowing and to have swallow study and need to get this.  Bad toenails and toes; see poditary .  she has been compliant with current medications have reviewed them.  The patient denies medication side effects.  Not depressed  Declines weight today  She has had some hallucinations and on and off; declines CT head and neuropsych eval for about a month.  I will get urine and check for UTI  Results for orders placed or performed during the hospital encounter of 08/17/18   Comprehensive Metabolic Panel   Result Value Ref Range    Glucose 105 (H) 65 - 99 mg/dL    BUN 10 8 - 23 mg/dL    Creatinine 0.88 0.57 - 1.00 mg/dL    Sodium 143 136 - 145 mmol/L    Potassium 2.9 (L) 3.5 - 5.2 mmol/L    Chloride 101 98 - 107 mmol/L    CO2 29.0 22.0 - 29.0 mmol/L    Calcium 8.6 8.6 - 10.5 mg/dL    Total Protein 6.4 6.0 - 8.5 g/dL    Albumin 3.60 3.50 - 5.20 g/dL    ALT (SGPT) 7 1 - 33 U/L    AST (SGOT) 8 1 - 32 U/L    Alkaline Phosphatase 92 39 - 117 U/L    Total Bilirubin 0.6 0.1 - 1.2 mg/dL    eGFR Non African Amer 61 >60 mL/min/1.73    Globulin 2.8 gm/dL    A/G Ratio 1.3 g/dL    BUN/Creatinine Ratio 11.4 7.0 - 25.0    Anion Gap 13.0 mmol/L    Urinalysis With Microscopic If Indicated (No Culture) - Urine, Clean Catch   Result Value Ref Range    Color, UA Yellow Yellow, Straw    Appearance, UA Cloudy (A) Clear    pH, UA 6.5 5.0 - 8.0    Specific Gravity, UA 1.009 1.005 - 1.030    Glucose, UA Negative Negative    Ketones, UA Negative Negative    Bilirubin, UA Negative Negative    Blood, UA Negative Negative    Protein, UA Negative Negative    Leuk Esterase, UA Large (3+) (A) Negative    Nitrite, UA Negative Negative    Urobilinogen, UA 1.0 E.U./dL 0.2 - 1.0 E.U./dL   BNP   Result Value Ref Range    proBNP 2,043.0 (H) 0.0-1,800.0 pg/mL   Troponin   Result Value Ref Range    Troponin T <0.010 0.000 - 0.030 ng/mL   CBC Auto Differential   Result Value Ref Range    WBC 7.52 4.50 - 10.70 10*3/mm3    RBC 4.29 3.90 - 5.20 10*6/mm3    Hemoglobin 13.1 11.9 - 15.5 g/dL    Hematocrit 41.4 35.6 - 45.5 %    MCV 96.5 80.5 - 98.2 fL    MCH 30.5 26.9 - 32.0 pg    MCHC 31.6 (L) 32.4 - 36.3 g/dL    RDW 12.6 11.7 - 13.0 %    RDW-SD 44.1 37.0 - 54.0 fl    MPV 11.5 6.0 - 12.0 fL    Platelets 198 140 - 500 10*3/mm3    Neutrophil % 48.2 42.7 - 76.0 %    Lymphocyte % 42.0 19.6 - 45.3 %    Monocyte % 7.8 5.0 - 12.0 %    Eosinophil % 1.5 0.3 - 6.2 %    Basophil % 0.5 0.0 - 1.5 %    Immature Grans % 0.0 0.0 - 0.5 %    Neutrophils, Absolute 3.62 1.90 - 8.10 10*3/mm3    Lymphocytes, Absolute 3.16 0.90 - 4.80 10*3/mm3    Monocytes, Absolute 0.59 0.20 - 1.20 10*3/mm3    Eosinophils, Absolute 0.11 0.00 - 0.70 10*3/mm3    Basophils, Absolute 0.04 0.00 - 0.20 10*3/mm3    Immature Grans, Absolute 0.00 0.00 - 0.03 10*3/mm3   Urinalysis, Microscopic Only - Urine, Clean Catch   Result Value Ref Range    RBC, UA 3-5 (A) None Seen, 0-2 /HPF    WBC, UA Too Numerous to Count (A) None Seen, 0-2 /HPF    Bacteria, UA None Seen None Seen /HPF    Squamous Epithelial Cells, UA 3-6 (A) None Seen, 0-2 /HPF    Hyaline Casts, UA 3-6 None Seen /LPF    Methodology Automated Microscopy      I see blood in  urine and declines work up urologist    She is seeing neuro and went to Dr Cesar 2 weeks ago.  Entire body shakes;   She is annoyed from the tremor; head to feet  Need to get her in with another neurologist  She still needs to see podiatrist for toenails  I will make referral  Not taking the KCl  She is not tolerant to pill size  Need cardiac appt  No new edema  I do have her on thyroid Rx  She is on Propanolol already  Went to ER 7-25-18 for this and K+ 2.9  Pills too big and not taking  She is alert and oriented     Urine + infection and will treat UTI; not enough urine for cx; see me one week if cont. confusion  The following portions of the patient's history were reviewed and updated as appropriate: allergies, current medications, past family history, past medical history, past social history, past surgical history and problem list.    Review of Systems   Constitutional: Positive for activity change and appetite change.   HENT: Positive for hearing loss and trouble swallowing.    Respiratory: Positive for cough and choking.    Genitourinary: Positive for urgency.   Musculoskeletal: Positive for gait problem.   Neurological: Positive for weakness and headaches.   Psychiatric/Behavioral: Positive for confusion, decreased concentration and dysphoric mood. The patient is nervous/anxious.        Objective   Physical Exam   Constitutional: She is oriented to person, place, and time. She appears well-developed and well-nourished. No distress.   HENT:   Head: Normocephalic and atraumatic.   Eyes: Pupils are equal, round, and reactive to light. Conjunctivae and EOM are normal. Right eye exhibits no discharge. Left eye exhibits no discharge. No scleral icterus.   Neck: Normal range of motion. Neck supple. No tracheal deviation present. No thyromegaly present.   Cardiovascular: Normal pulses.  Exam reveals no gallop.    No murmur heard.  Trace pedal edema = bilat  irreg irreg   Pulmonary/Chest: Effort normal and breath  sounds normal. No respiratory distress. She has no wheezes. She has no rales.   Abdominal: There is no tenderness.   Musculoskeletal: Normal range of motion. She exhibits deformity.   Right second toe overlaps first  Ambulates with cane   Lymphadenopathy:     She has no cervical adenopathy.   Neurological: She is alert and oriented to person, place, and time. Coordination abnormal.   Does have tremors from head to lower ext; this is not constant and seems to occur when I am observing her  No present altered awareness   Skin: Skin is warm. No rash noted. No erythema. No pallor.   Fungal toenails and thick, overgrown, irreg   Psychiatric: She has a normal mood and affect.   Nursing note and vitals reviewed.      Assessment/Plan   Marie was seen today for altered mental status.    Diagnoses and all orders for this visit:    Tremor  -     Ambulatory Referral to Neurology  -     Cancel: Comprehensive Metabolic Panel  -     Comprehensive Metabolic Panel  -     CBC & Differential    Hypokalemia  -     Cancel: Comprehensive Metabolic Panel  -     Comprehensive Metabolic Panel  -     CBC & Differential    Acquired hypothyroidism  -     Cancel: Comprehensive Metabolic Panel  -     Comprehensive Metabolic Panel  -     CBC & Differential    Essential hypertension  -     Cancel: Comprehensive Metabolic Panel  -     Comprehensive Metabolic Panel  -     CBC & Differential    Deformity of toe, unspecified laterality  -     Ambulatory Referral to Podiatry  -     Cancel: Comprehensive Metabolic Panel  -     Comprehensive Metabolic Panel  -     CBC & Differential    Altered mental status, unspecified altered mental status type  -     Urinalysis With Microscopic - Urine, Clean Catch  -     Urine Culture - Urine, Urine, Clean Catch  -     POC Urinalysis Dipstick, Automated  -     Comprehensive Metabolic Panel  -     CBC & Differential    Acute cystitis without hematuria    Other orders  -     potassium chloride (KAYCIEL) 20 MEQ/15ML  (10%) solution; Take 7.5 mL by mouth Daily.  -     doxycycline (VIBRAMYCIN) 100 MG capsule; Take 1 capsule by mouth 2 (Two) Times a Day. For infection

## 2018-11-11 PROBLEM — I50.9 CHF (CONGESTIVE HEART FAILURE) (HCC): Status: ACTIVE | Noted: 2018-01-01

## 2018-11-11 PROBLEM — I48.20 CHRONIC ATRIAL FIBRILLATION (HCC): Status: ACTIVE | Noted: 2018-01-01

## 2018-11-11 PROBLEM — F29 PSYCHOSIS (HCC): Status: ACTIVE | Noted: 2018-01-01

## 2018-11-11 NOTE — ED NOTES
"Attempted to obtain urine specimen and patient refuses. States that \"I am about to get out of here and I am NOT giving a urine specimen. My  is coming to get me\". Pt's  states that she is not .      Lavern Minor RN  11/11/18 4358    "

## 2018-11-11 NOTE — CONSULTS
"See previous nursing note regarding pt unwilling to participate in interview.      Biopsychosocial information obtained from sister and POA, Leigh.  Pt has lived with Leigh x 30 yr.  Pt raised Leigh when mother  at Leigh's childbirth.  Leigh states pt came out of convent to raise Leigh after their mother's death.      Leigh reports pt is having conversations day & night with her \".\"  Pt is stating God  them.  Today pt was determined to leave home to go to [place] to her 'spouse' large home.  She threw a hard household object at Leigh's dtr.  Leigh had to call a nephew () to get help for EMS to get pt to ER.  Pt doesn't have diagnosis of dementia but is \"forgetful\" over last few years.  Leigh has taken over the household.  Leigh states pt mentions \"didn't know how sick I was\" since being told 2018 that she was not a candidate for heart surgery, Leigh suspects some depression over her health is part of the issue currently.      Leigh never , no children, worked as  and cook at a restaurant.  No inpt psych.  Pt was admitted to [facility] for few days approx 50 yr ago to come off diazepam.  No ETOH, no CD issues otherwise.      Leigh has an appt  For pt with a psych MD for 1st appt but can't think of name of MD.  Leigh has promised pt she will never put her in a nursing home.      Explained to Leigh that even with medications added pt may be to difficult to manage at home on some days.  Leigh is agreeable to sign pt into psych unit.  Dr Nathan had ordered catheterization to get urine for UA.      Leigh describes some jerking movements, rolling of her head that neurologist believes is intentional.  Pt has stated she can control the movements to Leigh.  No movements noted when I was with pt.      Pt is continent of bowel, bladder.  Takes pills whole with applesauce.  No skin issues.  Leigh wants only antibiotics for life saving care, Leigh states no CPR, no intubation.  [facility] " has not been consulted.      Call placed to Dr Chopra at 5:50pm, mailbox full.

## 2018-11-11 NOTE — NURSING NOTE
"Pt knew name, year, stated month was \"October.\"  Knows at [facility].  Very irritable.  \"I don't know why I'm here, I want to go home.\"  Sister at bedside, lives with sister, nephew, 2 dogs by her report.  Denies SI/HI.  States she feels safe at home.  Interview not completed d/t pt irritability.  D/w Dr Nathan, he suggested waiting medical test results.      Pt records reviewed, pt has significant heart disease that is non-operable in April, 2018.  During that admission family asked about [facility], unclear if [facility] was pursued.    "

## 2018-11-11 NOTE — ED PROVIDER NOTES
[facility] EMERGENCY DEPARTMENT ENCOUNTER    CHIEF COMPLAINT  Chief Complaint: hallucinations  History given by: patient's great grand nephew, patient  History limited by: poor historian  [facility] Room Number: 42/42  PMD: Queenie Alas PA-C      HPI:  Pt is a 89 y.o. female who presents to the ED via EMS from home. Pt's great grand nephew is bedside and reports pt lives w/ him and he has noitced hallucinations that began about a month ago, but worsened over the past two weeks. Today, pt had increased agitation and kept claiming that her  was coming to pick her up. The pt has never been  per the family member and had even packed her suitcase this morning to leave. The pt's family have been trying to get pt seen by a psychiatrist, but have been unable to get in. Pt's family deny recent changes in medications or recent illness. Pt denies  complaints, n/v, fever, chills, HA, and other complaints. Pt has no hx of hallucinations or delusions.    Onset: gradual  Duration: one month, worsening today  Severity: moderate  Associated symptoms: none  Previous treatment: denies    PAST MEDICAL HISTORY  Active Ambulatory Problems     Diagnosis Date Noted   • Acid reflux 02/10/2016   • Hypertension 02/10/2016   • Arthritis 02/10/2016   • Hyperlipidemia 02/10/2016   • Venous insufficiency 08/17/2016   • Onychomycosis 08/17/2016   • Pedal edema 08/17/2016   • Vitamin D deficiency disease 07/25/2017   • New onset atrial fibrillation (CMS/HCC) 04/04/2018   • Acute congestive heart failure (CMS/HCC) 04/04/2018   • Acute respiratory failure with hypoxia (CMS/HCC) 04/04/2018   • Pleural effusion, bilateral 04/04/2018   • Hypokalemia 04/05/2018   • Abnormal stress test 04/04/2018   • CAD (coronary artery disease) 04/09/2018   • Acquired hypothyroidism 04/19/2018   • Asymptomatic microscopic hematuria 09/17/2018     Resolved Ambulatory Problems     Diagnosis Date Noted   • No Resolved Ambulatory Problems     Past Medical History:    Diagnosis Date   • Arthritis    • Basal cell adenocarcinoma    • CHF (congestive heart failure) (CMS/HCC)    • Hyperlipidemia    • Hypertension    • Onychomycosis    • Vitamin D deficiency disease        PAST SURGICAL HISTORY  Past Surgical History:   Procedure Laterality Date   • APPENDECTOMY     • CATARACT EXTRACTION     • CORNEAL TRANSPLANT     • HEMORROIDECTOMY     • TONSILLECTOMY         FAMILY HISTORY  Family History   Problem Relation Age of Onset   • Cancer Sister    • Diabetes Maternal Grandmother    • Diabetes Paternal Grandfather        SOCIAL HISTORY  Social History     Socioeconomic History   • Marital status: Single     Spouse name: Not on file   • Number of children: Not on file   • Years of education: Not on file   • Highest education level: Not on file   Social Needs   • Financial resource strain: Not on file   • Food insecurity - worry: Not on file   • Food insecurity - inability: Not on file   • Transportation needs - medical: Not on file   • Transportation needs - non-medical: Not on file   Occupational History   • Not on file   Tobacco Use   • Smoking status: Never Smoker   • Smokeless tobacco: Never Used   Substance and Sexual Activity   • Alcohol use: No   • Drug use: No   • Sexual activity: Defer   Other Topics Concern   • Not on file   Social History Narrative   • Not on file       ALLERGIES  Codeine    REVIEW OF SYSTEMS  Review of Systems   Constitutional: Negative for fever.   HENT: Negative for sore throat.    Eyes: Negative.    Respiratory: Negative for cough and shortness of breath.    Cardiovascular: Negative for chest pain.   Gastrointestinal: Negative for abdominal pain, diarrhea and vomiting.   Genitourinary: Negative for dysuria.   Musculoskeletal: Negative for neck pain.   Skin: Negative for rash.   Allergic/Immunologic: Negative.    Neurological: Negative for weakness, numbness and headaches.   Hematological: Negative.    Psychiatric/Behavioral: Positive for agitation (per  family) and hallucinations (per family).   All other systems reviewed and are negative.      PHYSICAL EXAM  ED Triage Vitals   Temp Heart Rate Resp BP SpO2   11/11/18 1314 11/11/18 1311 11/11/18 1311 11/11/18 1311 11/11/18 1311   98.2 °F (36.8 °C) 84 16 124/75 97 %      Temp src Heart Rate Source Patient Position BP Location FiO2 (%)   -- -- -- -- --              Physical Exam   Constitutional: She is oriented to person, place, and time. No distress.   HENT:   Head: Normocephalic and atraumatic.   Eyes: EOM are normal. Pupils are equal, round, and reactive to light.   Neck: Normal range of motion. Neck supple.   Cardiovascular: Normal rate, regular rhythm and normal heart sounds.   Pulmonary/Chest: Effort normal and breath sounds normal. No respiratory distress.   Abdominal: Soft. There is no tenderness. There is no rebound and no guarding.   Musculoskeletal: Normal range of motion. She exhibits no edema.   Neurological: She is alert and oriented to person, place, and time. She has normal sensation and normal strength. She displays facial symmetry and normal speech.   Oriented to person and place only   Skin: Skin is warm and dry. No rash noted.   Psychiatric: Mood and affect normal.   Nursing note and vitals reviewed.      LAB RESULTS  Lab Results (last 24 hours)     Procedure Component Value Units Date/Time    CBC & Differential [[id number]] Collected:  11/11/18 1531    Specimen:  Blood Updated:  11/11/18 1553    Narrative:       The following orders were created for panel order CBC & Differential.  Procedure                               Abnormality         Status                     ---------                               -----------         ------                     CBC Auto Differential[[id number]]        Abnormal            Final result                 Please view results for these tests on the individual orders.    Comprehensive Metabolic Panel [[id number]]  (Abnormal) Collected:  11/11/18 1531    Specimen:   Blood Updated:  11/11/18 1604     Glucose 152 mg/dL      BUN 13 mg/dL      Creatinine 1.07 mg/dL      Sodium 143 mmol/L      Potassium 3.6 mmol/L      Chloride 102 mmol/L      CO2 26.1 mmol/L      Calcium 9.6 mg/dL      Total Protein 7.6 g/dL      Albumin 3.80 g/dL      ALT (SGPT) 10 U/L      AST (SGOT) 13 U/L      Alkaline Phosphatase 87 U/L      Total Bilirubin 1.1 mg/dL      eGFR Non African Amer 48 mL/min/1.73      Globulin 3.8 gm/dL      A/G Ratio 1.0 g/dL      BUN/Creatinine Ratio 12.1     Anion Gap 14.9 mmol/L     Narrative:       The MDRD GFR formula is only valid for adults with stable renal function between ages 18 and 70.    Ethanol [[id number]] Collected:  11/11/18 1531    Specimen:  Blood Updated:  11/11/18 1604     Ethanol <10 mg/dL      Ethanol % <0.010 %     CBC Auto Differential [[id number]]  (Abnormal) Collected:  11/11/18 1531    Specimen:  Blood Updated:  11/11/18 1553     WBC 7.93 10*3/mm3      RBC 4.36 10*6/mm3      Hemoglobin 14.1 g/dL      Hematocrit 41.9 %      MCV 96.1 fL      MCH 32.3 pg      MCHC 33.7 g/dL      RDW 13.5 %      RDW-SD 47.4 fl      MPV 12.3 fL      Platelets 258 10*3/mm3      Neutrophil % 59.4 %      Lymphocyte % 34.7 %      Monocyte % 4.8 %      Eosinophil % 0.8 %      Basophil % 0.3 %      Immature Grans % 0.3 %      Neutrophils, Absolute 4.72 10*3/mm3      Lymphocytes, Absolute 2.75 10*3/mm3      Monocytes, Absolute 0.38 10*3/mm3      Eosinophils, Absolute 0.06 10*3/mm3      Basophils, Absolute 0.02 10*3/mm3      Immature Grans, Absolute 0.02 10*3/mm3           I ordered the above labs and reviewed the results    RADIOLOGY  CT Head Without Contrast   Final Result           No acute intracranial hemorrhage or hydrocephalus. Chronic appearing   changes. If there is further clinical concern, MRI could be considered   for further evaluation.       This report was finalized on 11/11/2018 2:03 PM by Dr. Norberto Estrada M.D.               I ordered the above noted  radiological studies. Interpreted by radiologist. Reviewed by me in PACS.       PROCEDURES  Procedures      PROGRESS AND CONSULTS     1817  Discussed the pt with Logan Corrales RN. She advised they are admitting the pt to psych.    MEDICAL DECISION MAKING  Results were reviewed/discussed with the patient and they were also made aware of online access. Pt also made aware that some labs, such as cultures, will not be resulted during ER visit and follow up with PMD is necessary.     MDM  Number of Diagnoses or Management Options  Acute psychosis (CMS/HCC):      Amount and/or Complexity of Data Reviewed  Clinical lab tests: reviewed (Unremarkable)  Tests in the radiology section of CPT®: reviewed (CT head- negative acute)  Decide to obtain previous medical records or to obtain history from someone other than the patient: yes (admitted 04/2018 for new onset afib and CHF. Had cardiac cath done at that time that showed 90% stenosis of the LAD and diagonal and 90% stenosis of the circumflex. Multiple vessel CAD but felt pt is not a surgical candidate due pt age.)  Discuss the patient with other providers: yes (Logan Corrales RN)  Independent visualization of images, tracings, or specimens: yes    Patient Progress  Patient progress: stable         DIAGNOSIS  Final diagnoses:   Acute psychosis (CMS/HCC)       DISPOSITION  ADMISSION    Discussed treatment plan and reason for admission with pt/family and admitting physician.  Pt/family voiced understanding of the plan for admission for further testing/treatment as needed.     Latest Documented Vital Signs:  As of 6:25 PM  BP- 101/75 HR- 84 Temp- 97.6 °F (36.4 °C) (Oral) O2 sat- 97%    --  Documentation assistance provided by prakash Burrell for Dr. Nathan.  Information recorded by the prakash was done at my direction and has been verified and validated by me.     Nidia Burrell  11/11/18 2987       Gordon Nathan MD  11/11/18 3353

## 2018-11-12 NOTE — PLAN OF CARE
Problem: Patient Care Overview  Goal: Plan of Care Review   11/12/18 1626   Plan of Care Review   Progress no change   Coping/Psychosocial   Plan of Care Reviewed With patient;grandchild(shree)   Coping/Psychosocial   Patient Agreement with Plan of Care agrees      11/12/18 1626   Plan of Care Review   Progress no change   Coping/Psychosocial   Plan of Care Reviewed With patient;sibling   Coping/Psychosocial   Patient Agreement with Plan of Care unable to participate;disagrees (describe)

## 2018-11-12 NOTE — H&P
CONSULT NOTE    INTERNAL MEDICINE   [facility]       Patient Identification:  Name: Marie Delgado  Age: 89 y.o.  Sex: female  :  1929  MRN: [id number]             Date of Consultation:  2018      Primary Care Physician: Queenie Alas, SANDOR                               Requesting Physician: Dr. Garland Dwyer the third  Reason for Consultation: Medical management while being admitted at crisis management unit    Chief Complaint:  Brought to the emergency room by grand nephew for progressive hallucinations over the course of last month.    History of Present Illness:   Source of information emergency room records discussion with nursing staff patient herself is not engaging in information exchange and claims that she is fine and wants to be left alone.  Patient is a 89-year-old female with past medical history as noted below including history of coronary artery disease atrial fibrillation hypertension and dyslipidemia who is on chronic anticoagulation therapy has been showing recent decline in her mental status with predominant hallucinations.  She was treated for suspected urinary tract infection with doxycycline 2018 when she was being evaluated for mental status.  Patient denies any physical symptoms and it does the decline in behavior and progressive hallucination that prompted this visit to the emergency room as patient was brought by her grandnephew to the ER.    Past Medical History:  Past Medical History:   Diagnosis Date   • Arthritis    • Basal cell adenocarcinoma     on her nose   • CHF (congestive heart failure) (CMS/HCC)    • Hyperlipidemia    • Hypertension    • Onychomycosis    • Vitamin D deficiency disease      Past Surgical History:  Past Surgical History:   Procedure Laterality Date   • APPENDECTOMY     • CATARACT EXTRACTION     • CORNEAL TRANSPLANT     • HEMORROIDECTOMY     • TONSILLECTOMY        Home Meds:  Medications Prior to Admission   Medication  Sig Dispense Refill Last Dose   • amLODIPine (NORVASC) 2.5 MG tablet TAKE ONE TABLET BY MOUTH DAILY 30 tablet 3 11/11/2018 at 08:00   • atorvastatin (LIPITOR) 20 MG tablet Take 1 tablet by mouth Daily. For cholesterol 30 tablet 6 11/11/2018 at 08:00   • Cholecalciferol (VITAMIN D3) 5000 UNITS tablet Take 5,000 Units by mouth daily.   11/11/2018 at 08:00   • ELIQUIS 5 MG tablet tablet TAKE ONE TABLET BY MOUTH EVERY 12 HOURS 60 tablet 1 11/11/2018 at 08:00   • furosemide (LASIX) 20 MG tablet TAKE ONE TABLET BY MOUTH DAILY 30 tablet 0 11/11/2018 at 08:00   • propranolol (INDERAL) 20 MG tablet TAKE ONE TABLET BY MOUTH TWO TIMES A DAY 60 tablet 2 11/11/2018 at 08:00   • nitroglycerin (NITROSTAT) 0.4 MG SL tablet 1 under the tongue as needed for chest pain, may repeat q5mins for up three doses 25 tablet 11 Taking   • omeprazole (priLOSEC) 20 MG capsule TAKE ONE CAPSULE BY MOUTH DAILY FOR GERD 30 capsule 10 Taking   • potassium chloride (KAYCIEL) 20 MEQ/15ML (10%) solution Take 7.5 mL by mouth Daily. 240 mL 5    • traMADol (ULTRAM) 50 MG tablet Take 1 tablet by mouth Every 8 (Eight) Hours As Needed for Moderate Pain . 90 tablet 1 Taking     Current Meds:     Current Facility-Administered Medications:   •  acetaminophen (TYLENOL) tablet 650 mg, 650 mg, Oral, Q4H PRN, Gray Chopra MD  •  aluminum-magnesium hydroxide-simethicone (MAALOX MAX) 400-400-40 MG/5ML suspension 15 mL, 15 mL, Oral, Q6H PRN, Gray Chopra MD  •  apixaban (ELIQUIS) tablet 5 mg, 5 mg, Oral, Q12H, Gray Chopra MD  •  hydrOXYzine (VISTARIL) capsule 50 mg, 50 mg, Oral, Q4H PRN, Gray Chopra MD  •  magnesium hydroxide (MILK OF MAGNESIA) suspension 2400 mg/10mL 10 mL, 10 mL, Oral, Daily PRN, Gray Chopra MD  •  propranolol (INDERAL) tablet 20 mg, 20 mg, Oral, Q12H, Gray Chopra MD  •  ziprasidone (GEODON) injection 20 mg, 20 mg, Intramuscular, Q4H PRN, Gray Chopra MD  Allergies:  Allergies   Allergen Reactions   •  Codeine Nausea Only     Social History:   Social History     Tobacco Use   • Smoking status: Never Smoker   • Smokeless tobacco: Never Used   Substance Use Topics   • Alcohol use: No      Family History:  Family History   Problem Relation Age of Onset   • Cancer Sister    • Diabetes Maternal Grandmother    • Diabetes Paternal Grandfather           Review of Systems  See history of present illness and past medical history.  Could not be obtained from the patient      Vitals:   LMP  (LMP Unknown)   I/O: No intake or output data in the 24 hours ending 11/11/18 2205  Exam:  General Appearance:    Alert, cooperative, no distress, appears stated age, does not want to engage.     Head:    Normocephalic, without obvious abnormality, atraumatic   Eyes:    PERRL, conjunctiva/corneas clear, EOM's intact, both eyes   Ears:    Normal external ear canals, both ears   Nose:   Nares normal, septum midline, mucosa normal, no drainage    or sinus tenderness   Throat:   Lips, tongue, gums normal; oral mucosa pink and moist   Neck:   Supple, symmetrical, trachea midline, no adenopathy;     thyroid:  no enlargement/tenderness/nodules; no carotid    bruit or JVD   Back:     Symmetric, no curvature, ROM normal, no CVA tenderness   Lungs:     Clear to auscultation bilaterally, respirations unlabored   Chest Wall:    No tenderness or deformity    Heart:    Irregularly irregular 2/6 systolic murmur noted    Abdomen:     Soft, non-tender, bowel sounds active all four quadrants,     no masses, no hepatomegaly, no splenomegaly   Extremities:   Extremities normal, atraumatic, no cyanosis or edema   Pulses:   Pulses palpable in all extremities; symmetric all extremities   Skin:   Skin color normal, Skin is warm and dry,  no rashes or palpable lesions   Neurologic:  Limited because patient does not want to engage.       Data Review:      I reviewed the patient's new clinical results.  Results from last 7 days   Lab Units  11/11/18   1531   WBC  10*3/mm3  7.93   HEMOGLOBIN g/dL  14.1   PLATELETS 10*3/mm3  258     Results from last 7 days   Lab Units  11/11/18   1531   SODIUM mmol/L  143   POTASSIUM mmol/L  3.6   CHLORIDE mmol/L  102   CO2 mmol/L  26.1   BUN mg/dL  13   CREATININE mg/dL  1.07*   CALCIUM mg/dL  9.6   GLUCOSE mg/dL  152*     Ct Head Without Contrast    Result Date: 11/11/2018       No acute intracranial hemorrhage or hydrocephalus. Chronic appearing changes. If there is further clinical concern, MRI could be considered for further evaluation.  This report was finalized on 11/11/2018 2:03 PM by Dr. Norberto Estrada M.D.      ECG 12 Lead   Preliminary Result   RR Interval= 545 ms   SC Interval= ms   QRSD Interval= 134 ms   QT Interval= 347 ms   QTc Interval= 470 ms   Heart Rate= 110 ms   P Axis= deg   QRS Axis= -43 deg   T Wave Axis= 164 deg   I: 40 Axis= 64 deg   T: 40 Axis= -74 deg   ST Axis= 212 deg   Atrial fibrillation   Left bundle branch block   Electronically Signed by:   Date and Time of Study: 2018-11-11 21:59:01          Assessment:  Active Hospital Problems    Diagnosis Date Noted   • Psychosis (CMS/HCC) [F29] 11/11/2018   • CHF (congestive heart failure) (CMS/[facility]) - histroy [I50.9] 11/11/2018   • Chronic atrial fibrillation (CMS/[facility]) [I48.2] 11/11/2018   • Acquired hypothyroidism [E03.9] 04/19/2018   • CAD (coronary artery disease) [I25.10] 04/09/2018   • Vitamin D deficiency disease [E55.9] 07/25/2017   • Venous insufficiency [I87.2] 08/17/2016   • Hypertension [I10] 02/10/2016   • Hyperlipidemia [E78.5] 02/10/2016       Medical decision making:  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  Management of psychosis and hallucinations per psychiatry service.  Chronic atrial fibrillation with anticoagulation therapy-she seems to  be clinically stable continue her present management until her mental status and her overall ability to take care of herself is clarified.  If she is seen as unable to understand and comprehend complexities of her medical regimen and safety of anticoagulation therapy in this context should be reassessed.  Hypothyroidism-continue thyroid replacement therapy  Hypertension monitor for hypotensive episodes.  She is on propranolol for her tremors and if her blood pressure is low consider withholding amlodipine.  History of coronary artery disease currently not exhibiting any symptoms of cardiac decompensation.  History of congestive heart failure currently not showing any evidence of decompensation is in terms of shortness of breath and respiratory distress or swelling of her legs.  Is to monitor and continue her current regimen.  Chronic kidney disease monitor and avoid nephrotoxic agents.  Hyperglycemia likely reactive check hemoglobin A1c.    Sean New MD   11/11/2018  10:05 PM  Much of this encounter note is an electronic transcription/translation of spoken language to printed text. The electronic translation of spoken language may permit erroneous, or at times, nonsensical words or phrases to be inadvertently transcribed; Although I have reviewed the note for such errors, some may still exist

## 2018-11-12 NOTE — PLAN OF CARE
Problem: Patient Care Overview  Goal: Individualization and Mutuality  Outcome: Ongoing (interventions implemented as appropriate)    Goal: Interprofessional Rounds/Family Conf  Outcome: Ongoing (interventions implemented as appropriate)   11/12/18 1015   Interdisciplinary Rounds/Family Conf   Summary Treatment team met to discuss pt's plan of care. Pt will receive neuropsych testing, PT & OT. SW will discuss discharge plans w/family. Pt's progress & svcs needed will be reviewed ongoing.   Interdisciplinary Rounds/Family Conf   Participants art therapy;;nursing;psychiatrist;pharmacy;social work     Patient/Guardian Signature: __________________________________            Psychiatrist Signature: ______________________________________             Therapist Signature: ________________________________________         Nurse Signature: ___________________________________________          Staff Signature: ____________________________________________            Staff Signature: ____________________________________________          Staff Signature: ____________________________________________          Staff Signature:                                                                                                      Problem: Cognitive Impairment (Psychotic Signs/Symptoms) (Adult)  Goal: Improved Thought Clarity/Organization (Psychotic Signs/Symptoms)  Outcome: Ongoing (interventions implemented as appropriate)   11/12/18 1015   Improved Thought Clarity/Organization (Psychotic Signs/Symptoms)   Improved Thought Clarity/Organization Action Step/Short Term Goal (STG) Established 11/12/18   Improved Thought Clarity/Organization Time Frame for Action Step (STG) 4 days   Improved Thought Clarity/Organization Action Step (STG) Outcome making progress toward outcome       Problem: Mental State/Mood Impairment (Psychotic Signs/Symptoms) (Adult)  Goal: Improved Mental State/Mood (Psychotic Signs/Symptoms)  Outcome:  Ongoing (interventions implemented as appropriate)   11/12/18 1015   Improved Mental State/Mood (Psychotic Signs/Symptoms)   Improved Mental State/Mood Action Step/Short Term Goal (STG) Established 11/12/18   Improved Mental State/Mood Time Frame for Action Step (STG) 4 days   Improved Mental State/Mood Action Step (STG) Outcome making progress toward outcome

## 2018-11-12 NOTE — PLAN OF CARE
Problem: Patient Care Overview  Goal: Discharge Needs Assessment  Outcome: Ongoing (interventions implemented as appropriate)   11/12/18 1051 11/12/18 1101   Discharge Needs Assessment   Concerns to be Addressed cognitive/perceptual;mental health;discharge planning --    Patient/Family Anticipates Transition to home with family --    Transportation Concerns car, none --    Discharge Coordination/Progress --  Pt will return home. Pt will receive neuropsych testing, PT & OT. SW will contact family & discharge d/c plans.
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
## 2018-11-12 NOTE — ED NOTES
Reminded pt of need for urine specimen. Pt still unable/unwilling to urinate into specimen container, unwilling to submit to straight catheterization. MD Nathan aware.      Liliana Jorgensen, RN  11/11/18 2050

## 2018-11-12 NOTE — SIGNIFICANT NOTE
11/12/18 1153   Rehab Time/Intention   Evaluation Not Performed patient/family declined evaluation  (Pt agressively refused eval at this time.  )   Rehab Treatment   Discipline physical therapist   Recommendation   PT - Next Appointment 11/13/18

## 2018-11-12 NOTE — PLAN OF CARE
Problem: Patient Care Overview  Goal: Plan of Care Review  Outcome: Ongoing (interventions implemented as appropriate)   11/12/18 0500 11/12/18 0509   Plan of Care Review   Progress --  no change   OTHER   Outcome Summary --  Alert and oriented to self only. Pt unable to participte in assessment. Will continue to monitor and assess.    Coping/Psychosocial   Plan of Care Reviewed With patient --    Coping/Psychosocial   Patient Agreement with Plan of Care unable to participate --        Problem: Overarching Goals (Adult)  Goal: Adheres to Safety Considerations for Self and Others  Outcome: Ongoing (interventions implemented as appropriate)    Goal: Optimized Coping Skills in Response to Life Stressors  Outcome: Ongoing (interventions implemented as appropriate)    Goal: Develops/Participates in Therapeutic [place] to Support Successful Transition  Outcome: Ongoing (interventions implemented as appropriate)      Problem: Cognitive Impairment (Psychotic Signs/Symptoms) (Adult)  Goal: Improved Thought Clarity/Organization (Psychotic Signs/Symptoms)  Outcome: Ongoing (interventions implemented as appropriate)      Problem: Mental State/Mood Impairment (Psychotic Signs/Symptoms) (Adult)  Goal: Improved Mental State/Mood (Psychotic Signs/Symptoms)  Outcome: Ongoing (interventions implemented as appropriate)      Problem: Fall Risk (Adult)  Goal: Absence of Fall  Outcome: Ongoing (interventions implemented as appropriate)

## 2018-11-12 NOTE — ED NOTES
Per Access, Pt has an assigned bed in psych however the room is dirty. Access will notify the ED when pt bed is clean and ready for pt.      Liliana Jorgensen RN  11/11/18 9654

## 2018-11-12 NOTE — PROGRESS NOTES
Continued Stay Note  [facility]     Patient Name: Marie Delgado  MRN: [id number]  Today's Date: 11/12/2018    Admit Date: 11/11/2018    Discharge Plan     Row Name 11/12/18 1100       Plan    Plan  Pt will return home.  Pt will receive neuropsych testing, PT & OT.  SW will contact family & discharge d/c plans.    Plan Comments  SW met w/pt to discuss treatment & discharge planning.  Pt was able to verify demographic info.  Pt stated that she was tired and wanted to go to sleep.  SW was able to verify family members that could be involved in treatment.        Discharge Codes    No documentation.             ANTONIO Archibald

## 2018-11-12 NOTE — ED NOTES
Access notified this RN that pt bed is ready. Pt being d/c to behavioral at this time.      Liliana Jorgensen RN  11/11/18 2123

## 2018-11-12 NOTE — PLAN OF CARE
Problem: Patient Care Overview  Goal: Plan of Care Review  Outcome: Ongoing (interventions implemented as appropriate)   11/12/18 1615   Plan of Care Review   Progress no change   OTHER   Outcome Summary Pt is irritable , argumentative, and refusing care, meds, or help getting dressed today . Pt refusing food or drink. Has been sitting in bed all day nude with sheet covering her waiting for her brother in law to come help her get dressed. Pt irritated it staff offers to help and if staff asks orientation questions. States she is leaving. Explained she was not going today and she stated God is discharging her. Pt insists her sister and niece have given her meds today. Has talked with herself nonstop. Druze themes   Coping/Psychosocial   Plan of Care Reviewed With patient   Coping/Psychosocial   Patient Agreement with Plan of Care agrees       Problem: Overarching Goals (Adult)  Goal: Adheres to Safety Considerations for Self and Others  Outcome: Ongoing (interventions implemented as appropriate)   11/12/18 1615   Overarching Goals (Adult)   Adheres to Safety Considerations for Self and Others making progress toward outcome     Goal: Optimized Coping Skills in Response to Life Stressors  Outcome: Ongoing (interventions implemented as appropriate)    Goal: Develops/Participates in Therapeutic [place] to Support Successful Transition  Outcome: Ongoing (interventions implemented as appropriate)   11/12/18 1615   Overarching Goals (Adult)   Develops/Participates in Therapeutic [place] to Support Successful Transition making progress toward outcome       Problem: Cognitive Impairment (Psychotic Signs/Symptoms) (Adult)  Goal: Improved Thought Clarity/Organization (Psychotic Signs/Symptoms)  Outcome: Ongoing (interventions implemented as appropriate)   11/12/18 1615   Improved Thought Clarity/Organization (Psychotic Signs/Symptoms)   Improved Thought Clarity/Organization Action Step/Short Term Goal (STG) Established  11/12/18   Improved Thought Clarity/Organization Time Frame for Action Step (STG) 4 days   Improved Thought Clarity/Organization Action Step (STG) Outcome unable to achieve outcome       Problem: Mental State/Mood Impairment (Psychotic Signs/Symptoms) (Adult)  Goal: Improved Mental State/Mood (Psychotic Signs/Symptoms)  Outcome: Ongoing (interventions implemented as appropriate)   11/12/18 1615   Improved Mental State/Mood (Psychotic Signs/Symptoms)   Improved Mental State/Mood Action Step/Short Term Goal (STG) Established 11/12/18   Improved Mental State/Mood Time Frame for Action Step (STG) 4 days   Improved Mental State/Mood Action Step (STG) Outcome making progress toward outcome;unable to achieve outcome       Problem: Fall Risk (Adult)  Goal: Identify Related Risk Factors and Signs and Symptoms  Outcome: Outcome(s) achieved Date Met: 11/12/18    Goal: Absence of Fall  Outcome: Ongoing (interventions implemented as appropriate)   11/12/18 1615   Fall Risk (Adult)   Absence of Fall making progress toward outcome

## 2018-11-13 NOTE — PROGRESS NOTES
"Neuropsych consult received on 11/12.  Pt seen today for neuropsychological evaluation.  She was cooperative, oriented to name, month, year and city.  Remained mostly cooperative but easily overwhelmed by more difficult measures, easily giving up.  Thoughts seemed logical, no overt symptoms of psychosis demonstrated.  Mild impulsivity and perseverative thinking indicated.  Given cognitive limitations, a briefer battery was administered.  Results reveal generalized cognitive dysfunction, with impairment identified for bilateral memory, semantic verbal fluency, working memory, verbal abstract reasoning, and all visuospatial skills. Some language abilities remain intact, with ability to follow one step commands consistently, and inconsistent two-step commands.  This pattern is most consistent with an Alzheimer's Dementia, with Behavioral Disturbance.  Pt refused to discuss depressive symptomatology although admits to being \"sad.\"      "

## 2018-11-13 NOTE — PROGRESS NOTES
The patient is oriented to person and place this morning and is cooperative with interview.  She is continuing to report visual hallucinations at night but her condition seems to worsen at night.  I will begin a trial of low-dose olanzapine to be given at bedtime in hopes of addressing this.

## 2018-11-13 NOTE — THERAPY EVALUATION
Acute Care - Occupational Therapy Initial Evaluation  [facility]     Patient Name: Marie Delgado  : 1929  MRN: [id number]  Today's Date: 2018  Onset of Illness/Injury or Date of Surgery: 18     Referring Physician: Ashwini    Admit Date: 2018     No diagnosis found.  Patient Active Problem List   Diagnosis   • Acid reflux   • Hypertension   • Arthritis   • Hyperlipidemia   • Venous insufficiency   • Onychomycosis   • Pedal edema   • Vitamin D deficiency disease   • New onset atrial fibrillation (CMS/HCC)   • Acute congestive heart failure (CMS/HCC)   • Acute respiratory failure with hypoxia (CMS/HCC)   • Pleural effusion, bilateral   • Hypokalemia   • Abnormal stress test   • CAD (coronary artery disease)   • Acquired hypothyroidism   • Asymptomatic microscopic hematuria   • Psychosis (CMS/HCC)   • CHF (congestive heart failure) (CMS/HCC) - histroy   • Chronic atrial fibrillation (CMS/HCC)     Past Medical History:   Diagnosis Date   • Arthritis    • Basal cell adenocarcinoma     on her nose   • CHF (congestive heart failure) (CMS/HCC)    • Hyperlipidemia    • Hypertension    • Onychomycosis    • Vitamin D deficiency disease      Past Surgical History:   Procedure Laterality Date   • APPENDECTOMY     • CATARACT EXTRACTION     • CORNEAL TRANSPLANT     • HEMORROIDECTOMY     • TONSILLECTOMY            OT ASSESSMENT FLOWSHEET (last 72 hours)      Occupational Therapy Evaluation     Row Name 18 1400                   OT Evaluation Time/Intention    Subjective Information  no complaints  -MR        Document Type  evaluation  -MR        Mode of Treatment  occupational therapy  -MR        Patient Effort  good  -MR        Symptoms Noted During/After Treatment  none  -MR           General Information    Patient Profile Reviewed?  yes  -MR        Onset of Illness/Injury or Date of Surgery  18  -MR        Referring Physician  Ashwini  -MR        General Observations of Patient  pt  seated in common area, staff present, posey vest applied  -MR        Prior Level of Function  independent:;ADL's per pt report  -MR        Pertinent History of Current Functional Problem  admitted with increasing delusional thinking and violent acting out  -MR        Existing Precautions/Restrictions  fall  -MR        Benefits Reviewed  patient:  -MR        Barriers to Rehab  cognitive status  -MR           Relationship/Environment    Lives With  sibling(s) per chart pt lives with sister  -MR           Resource/Environmental Concerns    Current Living Arrangements  home/apartment/condo  -MR           Cognitive Assessment/Intervention- PT/OT    Orientation Status (Cognition)  oriented to;person;place  -MR        Follows Commands (Cognition)  repetition of directions required;verbal cues/prompting required  -MR        Safety Deficit (Cognitive)  insight into deficits/self awareness;awareness of need for assistance;safety precautions follow-through/compliance  -MR           Safety Issues, Functional Mobility    Safety Issues Affecting Function (Mobility)  awareness of need for assistance;insight into deficits/self awareness;safety precautions follow-through/compliance  -MR           Bed Mobility Assessment/Treatment    Comment (Bed Mobility)  not tested, pt up in chair  -MR           Functional Mobility    Functional Mobility- Ind. Level  minimum assist (75% patient effort)  -MR        Functional Mobility- Device  rolling walker  -MR           Transfer Assessment/Treatment    Transfer Assessment/Treatment  sit-stand transfer;stand-sit transfer  -MR           Sit-Stand Transfer    Sit-Stand [place] (Transfers)  moderate assist (50% patient effort)  -MR        Assistive Device (Sit-Stand Transfers)  walker, front-wheeled  -MR           Stand-Sit Transfer    Stand-Sit [place] (Transfers)  minimum assist (75% patient effort);moderate assist (50% patient effort)  -MR        Assistive Device (Stand-Sit Transfers)   walker, front-wheeled  -MR           ADL Assessment/Intervention    BADL Assessment/Intervention  lower body dressing  -MR           Lower Body Dressing Assessment/Training    Lower Body Dressing [place] Level  doff;don;socks;supervision;verbal cues;nonverbal cues (demo/gesture)  -MR        Lower Body Dressing Position  supported sitting  -MR           General ROM    GENERAL ROM COMMENTS  BUE AROM grossly WFL  -MR           MMT (Manual Muscle Testing)    General MMT Comments  BUE > or = 3+/5  -MR           Positioning and Restraints    Pre-Treatment Position  sitting in chair/recliner  -MR        Post Treatment Position  chair  -MR        In Chair  sitting;patient within staff view pt seated in common area, staff present  -MR        Restraints  reapplied:;vest  -MR           Pain Assessment    Additional Documentation  Pain Scale: Word Pre/Post-Treatment (Group)  -MR           Pain Scale: Word Pre/Post-Treatment    Pain: Word Scale, Pretreatment  0 - no pain  -MR        Pain: Word Scale, Post-Treatment  0 - no pain  -MR           Clinical Impression (OT)    Criteria for Skilled Therapeutic Interventions Met (OT Eval)  yes;treatment indicated  -MR        Rehab Potential (OT Eval)  good, to achieve stated therapy goals  -MR        Therapy Frequency (OT Eval)  5 times/wk  -MR        Anticipated Discharge Disposition (OT)  skilled nursing facility;home with assist;home with home health pending pt's progress  -MR           OT Goals    Transfer Goal Selection (OT)  transfer, OT goal 1  -MR        Dressing Goal Selection (OT)  --  -MR        Grooming Goal Selection (OT)  grooming, OT goal 1  -MR        Functional Mobility Goal Selection (OT)  functional mobility, OT goal 1  -MR        Additional Documentation  Grooming Goal Selection (OT) (Row);Functional Mobility Selection (OT) (Row)  -MR           Transfer Goal 1 (OT)    Activity/Assistive Device (Transfer Goal 1, OT)  bed-to-chair/chair-to-bed;toilet;walker,  rolling  -MR        [place] Level/Cues Needed (Transfer Goal 1, OT)  contact guard assist  -MR        Time Frame (Transfer Goal 1, OT)  long term goal (LTG);by discharge  -MR           Grooming Goal 1 (OT)    Activity/Device (Grooming Goal 1, OT)  grooming skills, all  -MR        [place] (Grooming Goal 1, OT)  supervision required standing at sink  -MR        Time Frame (Grooming Goal 1, OT)  long term goal (LTG);by discharge  -MR           Functional Mobility Goal 1 (OT)    Activity/Assistive Device (Functional Mobility Goal 1, OT)  walker, rolling  -MR        [place] Level/Cues Needed (Functional Mobility Goal 1, OT)  supervision required;contact guard assist  -MR        Distance Goal 1 (Functional Mobility, OT)  pt to perform functional mobility to the bathroom, to the sink for ADLs  -MR        Time Frame (Functional Mobility Goal 1, OT)  long term goal (LTG);by discharge  -MR          User Key  (r) = Recorded By, (t) = Taken By, (c) = Cosigned By    Initials Name Effective Dates    Ana Martinez, OT 06/22/16 -          Occupational Therapy Education     Title: PT OT SLP Therapies (Active)     Topic: Occupational Therapy (Active)     Point: ADL training (Active)     Description: Instruct learner(s) on proper safety adaptation and remediation techniques during self care or transfers.   Instruct in proper use of assistive devices.    Learning Progress Summary           Patient Acceptance, E,TB, NR by  at 11/13/2018  2:28 PM    Comment:  Pt educated on role of OT.                               User Key     Initials Effective Dates Name Provider Type Discipline    MR 06/22/16 -  Ana Aguirre, OT Occupational Therapist OT                  OT Recommendation and Plan  Outcome Summary/Treatment Plan (OT)  Anticipated Discharge Disposition (OT): skilled nursing facility, home with assist, home with home health(pending pt's progress)  Therapy Frequency (OT Eval): 5 times/wk  Plan of Care  Review  Plan of Care Reviewed With: patient  Plan of Care Reviewed With: patient  Outcome Summary: Pt seen for initial evaluation, recommend skilled OT services to increase safety and independence with performance of ADLs.    Outcome Measures     Row Name 11/13/18 1400             How much help from another is currently needed...    Putting on and taking off regular lower body clothing?  2  -MR      Bathing (including washing, rinsing, and drying)  2  -MR      Toileting (which includes using toilet bed pan or urinal)  2  -MR      Putting on and taking off regular upper body clothing  3  -MR      Taking care of personal grooming (such as brushing teeth)  3  -MR      Eating meals  3  -MR      Score  15  -MR         Functional Assessment    Outcome Measure Options  AM-PAC 6 Clicks Daily Activity (OT)  -MR        User Key  (r) = Recorded By, (t) = Taken By, (c) = Cosigned By    Initials Name Provider Type    Ana Martinez MATILDA Chicas Occupational Therapist          Time Calculation:   Time Calculation- OT     Row Name 11/13/18 1429             Time Calculation- OT    OT Start Time  0910  -MR      OT Stop Time  0925  -MR      OT Time Calculation (min)  15 min  -MR      OT Received On  11/13/18  -MR      OT Goal Re-Cert Due Date  11/20/18  -MR        User Key  (r) = Recorded By, (t) = Taken By, (c) = Cosigned By    Initials Name Provider Type    MR HealymeraryAna OT Occupational Therapist        Therapy Suggested Charges     Code   Minutes Charges    None           Therapy Charges for Today     Code Description Service Date Service Provider Modifiers Qty    51083803034 HC OT EVAL MOD COMPLEXITY 2 11/13/2018 Ana Aguirre OT GO 1               Ana Aguirre OT  11/13/2018

## 2018-11-13 NOTE — PLAN OF CARE
"Problem: Patient Care Overview  Goal: Plan of Care Review  Outcome: Outcome(s) achieved Date Met: 11/13/18 11/12/18 1626 11/12/18 2323 11/13/18 0233   Plan of Care Review   Progress no change --  --    OTHER   Outcome Summary --  --  Pt remained in room this shift, restless. Cooperative and compliant with assessment and medications. Irritable and cranky at times, stating \"Don't make me get mad at you.\" Pt is visually hallucinating, stated she was seeing water on the floor, \"It's flooding, don't you see it.\" Oriented to self only, stated this was her house and she's lived here for 20 years. Pt got up at approx 0440 screaming \"Rod\" confused, kicked staff, cursed staff. Pt unsteady on feet, trying to get out of bed on own, arguing and pushing staff when assisting to bathroom. Order received for Jorge vest, PRN IM Zyprexa given.Will continue to monitor.   Coping/Psychosocial   Plan of Care Reviewed With --  patient --    Coping/Psychosocial   Patient Agreement with Plan of Care --  unable to participate  (confusion) --        Problem: Overarching Goals (Adult)  Goal: Adheres to Safety Considerations for Self and Others  Outcome: Ongoing (interventions implemented as appropriate)   11/13/18 0233   Overarching Goals (Adult)   Adheres to Safety Considerations for Self and Others making progress toward outcome     Goal: Develops/Participates in Therapeutic [place] to Support Successful Transition  Outcome: Ongoing (interventions implemented as appropriate)   11/13/18 0233   Overarching Goals (Adult)   Develops/Participates in Therapeutic [place] to Support Successful Transition making progress toward outcome       Problem: Cognitive Impairment (Psychotic Signs/Symptoms) (Adult)  Goal: Improved Thought Clarity/Organization (Psychotic Signs/Symptoms)  Outcome: Ongoing (interventions implemented as appropriate)   11/13/18 0233   Improved Thought Clarity/Organization (Psychotic Signs/Symptoms)   Improved Thought " Clarity/Organization Action Step (STG) Outcome making progress toward outcome       Problem: Mental State/Mood Impairment (Psychotic Signs/Symptoms) (Adult)  Goal: Improved Mental State/Mood (Psychotic Signs/Symptoms)  Outcome: Ongoing (interventions implemented as appropriate)   11/13/18 0233   Improved Mental State/Mood (Psychotic Signs/Symptoms)   Improved Mental State/Mood Action Step (STG) Outcome making progress toward outcome      11/13/18 0233   Improved Mental State/Mood (Psychotic Signs/Symptoms)   Improved Mental State/Mood Action Step (STG) Outcome making progress toward outcome       Problem: Fall Risk (Adult)  Goal: Absence of Fall  Outcome: Ongoing (interventions implemented as appropriate)   11/13/18 0233   Fall Risk (Adult)   Absence of Fall making progress toward outcome

## 2018-11-13 NOTE — PLAN OF CARE
"Problem: Patient Care Overview  Goal: Plan of Care Review  Outcome: Ongoing (interventions implemented as appropriate)   11/13/18 0907 11/13/18 1428 11/13/18 1646   Plan of Care Review   Progress --  --  no change   OTHER   Outcome Summary --  --  Pt confused and irritable throughout shift. Pt oriented to self and place. Pt confused but able to have a coherent conversation. Pt yelling out \"Palak!\" in afternoon and continued to state that she saw \"Palak\" wearing a yellow dress out in the hallway. Pt able to be redirected for periods of time, but will again call out and become irritable. Pt compliant with medications crushed in applesauce. Pt able to let staff know when she has to use the restroom. Fall precautions in place. Pt remains in posey vest d/t impulsivity and confusion. Will continue to monitor and provide support.    Coping/Psychosocial   Plan of Care Reviewed With --  patient --    Coping/Psychosocial   Patient Agreement with Plan of Care unable to participate --  --        Problem: Overarching Goals (Adult)  Goal: Adheres to Safety Considerations for Self and Others  Outcome: Ongoing (interventions implemented as appropriate)   11/13/18 1646   Overarching Goals (Adult)   Adheres to Safety Considerations for Self and Others making progress toward outcome     Intervention: Develop and Maintain Individualized Safety Plan   11/13/18 0907 11/13/18 1600   Violence Risk   Feels Like Hurting Others no --    Previous Attempt to Harm Others no --    C-SSRS (Recent)   Wish to be Dead no --    Suicidal Thoughts no --    Suicide Behavior no --    Develop and Maintain Individualized Safety Plan   Safety Measures --  safety rounds completed       Goal: Optimized Coping Skills in Response to Life Stressors  Outcome: Ongoing (interventions implemented as appropriate)   11/13/18 1646   Overarching Goals (Adult)   Optimized Coping Skills in Response to Life Stressors making progress toward outcome     Intervention: " Promote Effective Coping Strategies   11/13/18 0907   Coping/Psychosocial Interventions   Supportive Measures active listening utilized;verbalization of feelings encouraged       Goal: Develops/Participates in Therapeutic [place] to Support Successful Transition  Outcome: Ongoing (interventions implemented as appropriate)   11/13/18 1646   Overarching Goals (Adult)   Develops/Participates in Therapeutic [place] to Support Successful Transition making progress toward outcome     Intervention: Foster Therapeutic [place]   11/13/18 0907   Interventions   Trust Relationship/Rapport care explained;choices provided;emotional support provided;empathic listening provided;questions answered;questions encouraged;reassurance provided;thoughts/feelings acknowledged     Intervention: Mutually Develop Transition Plan   11/13/18 0907   Mutually Develop Transition Plan   Transition Support crisis management plan promoted         Problem: Cognitive Impairment (Psychotic Signs/Symptoms) (Adult)  Intervention: Promote Thought Clarity and Organization   11/13/18 1646   Promote Thought Clarity and Organization   Mutually Determined Action Steps (Promote Thought Clarity/Organization) follows step-by-step instructions       Goal: Improved Thought Clarity/Organization (Psychotic Signs/Symptoms)  Outcome: Ongoing (interventions implemented as appropriate)   11/12/18 1615 11/13/18 1646   Improved Thought Clarity/Organization (Psychotic Signs/Symptoms)   Improved Thought Clarity/Organization Action Step/Short Term Goal (STG) Established 11/12/18 --    Improved Thought Clarity/Organization Time Frame for Action Step (STG) --  3 days   Improved Thought Clarity/Organization Action Step (STG) Outcome --  making progress toward outcome       Problem: Mental State/Mood Impairment (Psychotic Signs/Symptoms) (Adult)  Goal: Improved Mental State/Mood (Psychotic Signs/Symptoms)   11/12/18 1615 11/13/18 1646   Improved Mental State/Mood (Psychotic  Signs/Symptoms)   Improved Mental State/Mood Action Step/Short Term Goal (STG) Established 11/12/18 --    Improved Mental State/Mood Time Frame for Action Step (STG) --  3 days   Improved Mental State/Mood Action Step (STG) Outcome --  making progress toward outcome       Problem: Fall Risk (Adult)  Intervention: Monitor/Assist with Self Care   11/13/18 0907   Activity   Activity Assistance Provided assistance, 1 person   Daily Care Interventions   Self-Care Promotion independence encouraged;BADL personal objects within reach;meal setup provided     Intervention: Reduce Risk/Promote Restraint Free Environment   11/13/18 1600   Safety Management   Environmental Safety Modification assistive device/personal items within reach;clutter free environment maintained;room organization consistent   Safety Promotion/Fall Prevention safety round/check completed;nonskid shoes/slippers when out of bed;fall prevention program maintained     Intervention: Review Medications/Identify Contributors to Fall Risk   11/13/18 1600   Safety Management   Medication Review/Management medications reviewed       Goal: Absence of Fall  Outcome: Ongoing (interventions implemented as appropriate)   11/13/18 1646   Fall Risk (Adult)   Absence of Fall making progress toward outcome       Problem: Restraint, Nonbehavioral (Nonviolent)  Intervention: Protect Skin and Joint Integrity   11/13/18 0907   Positioning   Body Position other (see comments)  (pt up in chair in dayroom)     Intervention: Implement Least-restrictive Safety Strategies   11/13/18 1646   Implement Least-restrictive Safety Strategies   Less Restrictive Alternatives environment adjusted;calming techniques promoted;positive reinforcement provided;sensory stimulation limited   Psychosocial Support   Diversional Activities reading     Intervention: Protect Dignity, Rights, and Personal Wellbeing   11/13/18 0907   Interventions   Trust Relationship/Rapport care explained;choices  provided;emotional support provided;empathic listening provided;questions answered;questions encouraged;reassurance provided;thoughts/feelings acknowledged       Goal: Rationale and Justification  Outcome: Ongoing (interventions implemented as appropriate)   11/13/18 0459   Restraint, Nonbehavioral (Nonviolent)   Rationale and Justification failure of less restrictive safety measures;prevent harm to self     Goal: Nonbehavioral (Nonviolent) Restraint: Absence of Injury/Harm  Outcome: Ongoing (interventions implemented as appropriate)   11/13/18 1646   Restraint, Nonbehavioral (Nonviolent)   Nonbehavioral (Nonviolent) Restraint: Absence of Injury/Harm met     Goal: Nonbehavioral (Nonviolent) Restraint: Achievement of Discontinuation Criteria  Outcome: Ongoing (interventions implemented as appropriate)   11/13/18 1646   Restraint, Nonbehavioral (Nonviolent)   Nonbehavioral (Nonviolent) Restraint: Achievement of Discontinuation Criteria not met     Goal: Nonbehavioral (Nonviolent) Restraint: Preservation of Dignity and Wellbeing  Outcome: Ongoing (interventions implemented as appropriate)   11/13/18 1646   Restraint, Nonbehavioral (Nonviolent)   Nonbehavioral (Nonviolent) Restraint: Preservation of Dignity and Wellbeing met

## 2018-11-13 NOTE — PLAN OF CARE
Problem: Restraint, Nonbehavioral (Nonviolent)  Goal: Rationale and Justification  Outcome: Ongoing (interventions implemented as appropriate)   11/13/18 0459   Restraint, Nonbehavioral (Nonviolent)   Rationale and Justification failure of less restrictive safety measures;prevent harm to self     Goal: Nonbehavioral (Nonviolent) Restraint: Absence of Injury/Harm  Outcome: Ongoing (interventions implemented as appropriate)   11/13/18 0459   Restraint, Nonbehavioral (Nonviolent)   Nonbehavioral (Nonviolent) Restraint: Absence of Injury/Harm met     Goal: Nonbehavioral (Nonviolent) Restraint: Achievement of Discontinuation Criteria  Outcome: Ongoing (interventions implemented as appropriate)   11/13/18 0459   Restraint, Nonbehavioral (Nonviolent)   Nonbehavioral (Nonviolent) Restraint: Achievement of Discontinuation Criteria not met      11/13/18 0459   Restraint, Nonbehavioral (Nonviolent)   Nonbehavioral (Nonviolent) Restraint: Achievement of Discontinuation Criteria not met      11/13/18 0459   Restraint, Nonbehavioral (Nonviolent)   Nonbehavioral (Nonviolent) Restraint: Achievement of Discontinuation Criteria not met     Goal: Nonbehavioral (Nonviolent) Restraint: Preservation of Dignity and Wellbeing  Outcome: Ongoing (interventions implemented as appropriate)   11/13/18 0459   Restraint, Nonbehavioral (Nonviolent)   Nonbehavioral (Nonviolent) Restraint: Preservation of Dignity and Wellbeing met

## 2018-11-13 NOTE — PLAN OF CARE
Problem: Patient Care Overview  Goal: Plan of Care Review   11/13/18 1091   OTHER   Outcome Summary Pt seen for initial evaluation, recommend skilled OT services to increase safety and independence with performance of ADLs.   Coping/Psychosocial   Plan of Care Reviewed With patient

## 2018-11-14 NOTE — SIGNIFICANT NOTE
11/14/18 1015   Rehab Time/Intention   Evaluation Not Performed patient/family declined evaluation  (Pt initially agreeable to PT, then adamantly refusing. Increasingly agitated with attempts to encourage mobility. Continues to require posey vest; will follow up for evaluation as appropriate. )   Rehab Treatment   Discipline physical therapist   Recommendation   PT - Next Appointment 11/15/18

## 2018-11-14 NOTE — PLAN OF CARE
Problem: Patient Care Overview  Goal: Plan of Care Review   11/14/18 4331   OTHER   Outcome Summary Pt remains disoriented, unable to cognitively process information that she remains in hospital. Verbaliging no SI/HI. Responding well to calming voice and verbal reassurance. No physical aggression noted this shift.

## 2018-11-14 NOTE — PROGRESS NOTES
Continued Stay Note  [facility]     Patient Name: Marie Delgado  MRN: [id number]  Today's Date: 11/14/2018    Admit Date: 11/11/2018    Discharge Plan     Row Name 11/14/18 1129       Plan    Plan Comments  SW called and spoke w/pt's sister, Ana & nephew, Partha to discuss treatment & discharge planning.  DEREK advised that pt had a neuropsych test which showed early dementia alzheimer's type as well as depression.  DEREK advised that once the full report is in, we will get a recommendation from the Doctor.  Partha, pt's nephew expressed concern as to the pt still having a UTI; SW called and spoke w/the Nurse(Ashleigh) who stated that pt has not cooperated and that they will find a way to get a urine sample from pt.  DEREK advised that pt has been placed on Aricept for the dementia.  DEREK also advised that she would keep the family updated on pt's treatment.        Discharge Codes    No documentation.             ANTONIO Archibald

## 2018-11-14 NOTE — PLAN OF CARE
Problem: Patient Care Overview  Goal: Plan of Care Review  Outcome: Ongoing (interventions implemented as appropriate)    Goal: Interprofessional Rounds/Family Conf  Outcome: Ongoing (interventions implemented as appropriate)      Problem: Overarching Goals (Adult)  Goal: Adheres to Safety Considerations for Self and Others  Outcome: Ongoing (interventions implemented as appropriate)   11/14/18 0327   Overarching Goals (Adult)   Adheres to Safety Considerations for Self and Others making progress toward outcome     Goal: Optimized Coping Skills in Response to Life Stressors  Outcome: Ongoing (interventions implemented as appropriate)   11/14/18 0327   Overarching Goals (Adult)   Optimized Coping Skills in Response to Life Stressors making progress toward outcome     Goal: Develops/Participates in Therapeutic [place] to Support Successful Transition  Outcome: Ongoing (interventions implemented as appropriate)   11/14/18 0327   Overarching Goals (Adult)   Develops/Participates in Therapeutic [place] to Support Successful Transition making progress toward outcome       Problem: Cognitive Impairment (Psychotic Signs/Symptoms) (Adult)  Goal: Improved Thought Clarity/Organization (Psychotic Signs/Symptoms)  Outcome: Ongoing (interventions implemented as appropriate)   11/14/18 0327   Improved Thought Clarity/Organization (Psychotic Signs/Symptoms)   Improved Thought Clarity/Organization Action Step (STG) Outcome making progress toward outcome       Problem: Fall Risk (Adult)  Goal: Absence of Fall  Outcome: Ongoing (interventions implemented as appropriate)   11/14/18 0327   Fall Risk (Adult)   Absence of Fall making progress toward outcome       Problem: Restraint, Nonbehavioral (Nonviolent)  Goal: Rationale and Justification  Outcome: Ongoing (interventions implemented as appropriate)   11/14/18 0327   Restraint, Nonbehavioral (Nonviolent)   Rationale and Justification prevent harm to self;failure of less restrictive  safety measures     Goal: Nonbehavioral (Nonviolent) Restraint: Absence of Injury/Harm  Outcome: Ongoing (interventions implemented as appropriate)   11/14/18 0327   Restraint, Nonbehavioral (Nonviolent)   Nonbehavioral (Nonviolent) Restraint: Absence of Injury/Harm met     Goal: Nonbehavioral (Nonviolent) Restraint: Achievement of Discontinuation Criteria  Outcome: Ongoing (interventions implemented as appropriate)   11/14/18 0327   Restraint, Nonbehavioral (Nonviolent)   Nonbehavioral (Nonviolent) Restraint: Achievement of Discontinuation Criteria not met     Goal: Nonbehavioral (Nonviolent) Restraint: Preservation of Dignity and Wellbeing  Outcome: Ongoing (interventions implemented as appropriate)   11/14/18 0327   Restraint, Nonbehavioral (Nonviolent)   Nonbehavioral (Nonviolent) Restraint: Preservation of Dignity and Wellbeing met       Problem: Skin Injury Risk (Adult)  Goal: Identify Related Risk Factors and Signs and Symptoms  Outcome: Ongoing (interventions implemented as appropriate)   11/14/18 0327   Skin Injury Risk (Adult)   Related Risk Factors (Skin Injury Risk) cognitive impairment;advanced age     Goal: Skin Health and Integrity  Outcome: Ongoing (interventions implemented as appropriate)   11/14/18 0327   Skin Injury Risk (Adult)   Skin Health and Integrity making progress toward outcome

## 2018-11-14 NOTE — PROGRESS NOTES
The patient's neuropsychological evaluation is in fact consistent with Alzheimer's dementia.  Accordingly, I will start the patient on Aricept.  She continues to require the Posey restraint.  I will also start low-dose Depakote sprinkles in hopes of addressing  the patient's problematic wandering behaviors.

## 2018-11-15 NOTE — SIGNIFICANT NOTE
11/15/18 1131   Rehab Time/Intention   Evaluation Not Performed patient/family declined evaluation  (pt sitting up in bed in Posey. Refused PT despite encouragement.)   Rehab Treatment   Discipline physical therapist

## 2018-11-15 NOTE — PLAN OF CARE
Problem: Patient Care Overview  Goal: Plan of Care Review  Outcome: Ongoing (interventions implemented as appropriate)   11/14/18 2105 11/14/18 3278 11/15/18 0515   Plan of Care Review   Progress no change --  --    OTHER   Outcome Summary --  --  Pt cooperative and compliant with medications. No signs of agitation or aggression this shift. Pt remains in posey d/t ongoing confusion, A/V hallucinationsand restlessness. Will continue to monitor.   Coping/Psychosocial   Plan of Care Reviewed With --  patient --    Coping/Psychosocial   Patient Agreement with Plan of Care --  unable to participate --

## 2018-11-15 NOTE — PLAN OF CARE
Problem: Patient Care Overview  Goal: Plan of Care Review  Outcome: Ongoing (interventions implemented as appropriate)   11/14/18 2105   Plan of Care Review   Progress no change   OTHER   Outcome Summary Pt remins restless , confused and irritable in the posey. Coop for medication. Not eating. Testy with hands on care. Hallucinating . oreiented only to self   Coping/Psychosocial   Plan of Care Reviewed With patient   Coping/Psychosocial   Patient Agreement with Plan of Care unable to participate       Problem: Overarching Goals (Adult)  Goal: Adheres to Safety Considerations for Self and Others  Outcome: Ongoing (interventions implemented as appropriate)   11/14/18 2105   Overarching Goals (Adult)   Adheres to Safety Considerations for Self and Others making progress toward outcome     Goal: Optimized Coping Skills in Response to Life Stressors  Outcome: Ongoing (interventions implemented as appropriate)   11/14/18 2105   Overarching Goals (Adult)   Optimized Coping Skills in Response to Life Stressors making progress toward outcome     Goal: Develops/Participates in Therapeutic [place] to Support Successful Transition  Outcome: Ongoing (interventions implemented as appropriate)   11/14/18 2105   Overarching Goals (Adult)   Develops/Participates in Therapeutic [place] to Support Successful Transition making progress toward outcome       Problem: Cognitive Impairment (Psychotic Signs/Symptoms) (Adult)  Goal: Improved Thought Clarity/Organization (Psychotic Signs/Symptoms)  Outcome: Ongoing (interventions implemented as appropriate)   11/14/18 2105   Improved Thought Clarity/Organization (Psychotic Signs/Symptoms)   Improved Thought Clarity/Organization Action Step/Short Term Goal (STG) Established 11/12/18   Improved Thought Clarity/Organization Time Frame for Action Step (STG) 2 days   Improved Thought Clarity/Organization Action Step (STG) Outcome making progress toward outcome       Problem: Mental State/Mood  Impairment (Psychotic Signs/Symptoms) (Adult)  Goal: Improved Mental State/Mood (Psychotic Signs/Symptoms)  Outcome: Ongoing (interventions implemented as appropriate)   11/14/18 2105   Improved Mental State/Mood (Psychotic Signs/Symptoms)   Improved Mental State/Mood Action Step/Short Term Goal (STG) Established 11/12/18   Improved Mental State/Mood Time Frame for Action Step (STG) 2 days   Improved Mental State/Mood Action Step (STG) Outcome making progress toward outcome       Problem: Fall Risk (Adult)  Goal: Absence of Fall  Outcome: Ongoing (interventions implemented as appropriate)   11/14/18 2105   Fall Risk (Adult)   Absence of Fall making progress toward outcome       Problem: Restraint, Nonbehavioral (Nonviolent)  Goal: Rationale and Justification  Outcome: Ongoing (interventions implemented as appropriate)   11/14/18 2105   Restraint, Nonbehavioral (Nonviolent)   Rationale and Justification failure of less restrictive safety measures     Goal: Nonbehavioral (Nonviolent) Restraint: Absence of Injury/Harm  Outcome: Ongoing (interventions implemented as appropriate)    Goal: Nonbehavioral (Nonviolent) Restraint: Achievement of Discontinuation Criteria  Outcome: Ongoing (interventions implemented as appropriate)    Goal: Nonbehavioral (Nonviolent) Restraint: Preservation of Dignity and Wellbeing  Outcome: Ongoing (interventions implemented as appropriate)      Problem: Skin Injury Risk (Adult)  Goal: Identify Related Risk Factors and Signs and Symptoms  Outcome: Outcome(s) achieved Date Met: 11/14/18    Goal: Skin Health and Integrity  Outcome: Ongoing (interventions implemented as appropriate)   11/14/18 2105   Skin Injury Risk (Adult)   Skin Health and Integrity making progress toward outcome

## 2018-11-15 NOTE — PROGRESS NOTES
DAILY PROGRESS NOTE  [facility]    Patient Identification:  Name: Marie Delgado  Age: 89 y.o.  Sex: female  :  1929  MRN: [id number]         Primary Care Physician: Queenie Alas PA-C    Subjective:  Interval History:The patient is confused and up in chair with posey.    Objective:    Scheduled Meds:  apixaban 5 mg Oral Q12H   atorvastatin 20 mg Oral Daily   vitamin D3 5,000 Units Oral Daily   Divalproex Sodium 125 mg Oral TID   donepezil 5 mg Oral Nightly   furosemide 20 mg Oral Daily   OLANZapine 2.5 mg Oral Daily   OLANZapine 5 mg Oral Nightly   propranolol 20 mg Oral BID     Continuous Infusions:     Vital signs in last 24 hours:  Temp:  [97 °F (36.1 °C)-97.1 °F (36.2 °C)] 97.1 °F (36.2 °C)  Heart Rate:  [60-76] 76  Resp:  [16-20] 16  BP: ()/(57-76) 82/57    Intake/Output:    Intake/Output Summary (Last 24 hours) at 11/15/2018 1542  Last data filed at 11/15/2018 1200  Gross per 24 hour   Intake 120 ml   Output --   Net 120 ml       Exam:  BP (!) 82/57 (BP Location: Left arm, Patient Position: Sitting)   Pulse 76   Temp 97.1 °F (36.2 °C) (Axillary)   Resp 16   Wt 62 kg (136 lb 11 oz)   LMP  (LMP Unknown)   SpO2 95%   BMI 20.18 kg/m²     General Appearance:    Alert, cooperative, no distress   Head:    Normocephalic, without obvious abnormality, atraumatic   Eyes:       Throat:   Lips, tongue, gums normal   Neck:   Supple, symmetrical, trachea midline, no JVD   Lungs:     Clear to auscultation bilaterally, respirations unlabored   Chest Wall:    No tenderness or deformity    Heart:    Regular rate and rhythm, S1 and S2 normal, no murmur,no  Rub or gallop   Abdomen:     Soft, non-tender, bowel sounds active, no masses, no organomegaly    Extremities:   Extremities normal, atraumatic, no cyanosis or edema   Pulses:      Skin:   Skin is warm and dry,  no rashes or palpable lesions   Neurologic:   no focal deficits noted, confused and demented.      Lab Results (last 72 hours)      Procedure Component Value Units Date/Time    Urinalysis With Microscopic If Indicated (No Culture) - Urine, Clean Catch [[id number]]  (Abnormal) Collected:  11/15/18 0556    Specimen:  Urine, Clean Catch Updated:  11/15/18 0726     Color, UA Dark Yellow     Appearance, UA Turbid     pH, UA <=5.0     Specific Gravity, UA 1.016     Glucose, UA Negative     Ketones, UA Trace     Bilirubin, UA Negative     Blood, UA Large (3+)     Protein,  mg/dL (2+)     Leuk Esterase, UA Large (3+)     Nitrite, UA Negative     Urobilinogen, UA 1.0 E.U./dL    Urinalysis, Microscopic Only - Urine, Clean Catch [[id number]]  (Abnormal) Collected:  11/15/18 0556    Specimen:  Urine, Clean Catch Updated:  11/15/18 0726     RBC, UA Too Numerous to Count /HPF      WBC, UA 21-30 /HPF      Bacteria, UA 2+ /HPF      Squamous Epithelial Cells, UA 7-12 /HPF      Transitional Epithelial Cells, UA 0-2 /HPF      Hyaline Casts, UA None Seen /LPF      Methodology Manual Light Microscopy        Data Review:  Results from last 7 days   Lab Units  11/11/18   1531   SODIUM mmol/L  143   POTASSIUM mmol/L  3.6   CHLORIDE mmol/L  102   CO2 mmol/L  26.1   BUN mg/dL  13   CREATININE mg/dL  1.07*   GLUCOSE mg/dL  152*   CALCIUM mg/dL  9.6     Results from last 7 days   Lab Units  11/11/18   1531   WBC 10*3/mm3  7.93   HEMOGLOBIN g/dL  14.1   HEMATOCRIT %  41.9   PLATELETS 10*3/mm3  258     Results from last 7 days   Lab Units  11/12/18   0439  11/11/18   1531   TSH mIU/mL   --   3.960   FREE T4 ng/dL  1.53   --      Results from last 7 days   Lab Units  11/12/18   0716   HEMOGLOBIN A1C %  5.00     Lab Results   Lab Value Date/Time    TROPONINT <0.010 08/18/2018 0011    TROPONINT <0.010 04/04/2018 1208    TROPONINT <0.010 04/04/2018 0848    TROPONINT <0.010 04/04/2018 0217    TROPONINT <0.010 12/05/2016 0023         Results from last 7 days   Lab Units  11/11/18   1531   ALK PHOS U/L  87   BILIRUBIN mg/dL  1.1   ALT (SGPT) U/L  10   AST (SGOT) U/L  13      Results from last 7 days   Lab Units  11/12/18   0439  11/11/18   1531   TSH mIU/mL   --   3.960   FREE T4 ng/dL  1.53   --      Results from last 7 days   Lab Units  11/12/18   0716   HEMOGLOBIN A1C %  5.00     No results found for: POCGLU        Past Medical History:   Diagnosis Date   • Arthritis    • Basal cell adenocarcinoma     on her nose   • CHF (congestive heart failure) (CMS/Formerly [facility])    • Hyperlipidemia    • Hypertension    • Onychomycosis    • Vitamin D deficiency disease        Assessment:  Active Hospital Problems    Diagnosis Date Noted   • **Psychosis (CMS/Formerly [facility]) [F29] 11/11/2018   • CHF (congestive heart failure) (CMS/Formerly [facility]) - histroy [I50.9] 11/11/2018   • Chronic atrial fibrillation (CMS/Formerly [facility]) [I48.2] 11/11/2018   • Acquired hypothyroidism [E03.9] 04/19/2018   • CAD (coronary artery disease) [I25.10] 04/09/2018   • Vitamin D deficiency disease [E55.9] 07/25/2017   • Venous insufficiency [I87.2] 08/17/2016   • Hypertension [I10] 02/10/2016   • Hyperlipidemia [E78.5] 02/10/2016      Resolved Hospital Problems   No resolved problems to display.       Plan:  Will stop some of her medicine due to low blood pressure and will try to get urine culture. Not clear if she has real UTI or just contaminated specimen. Get some follow up lab.    El Canales MD  11/15/2018  3:42 PM

## 2018-11-15 NOTE — PLAN OF CARE
"Problem: Patient Care Overview  Goal: Plan of Care Review  Outcome: Ongoing (interventions implemented as appropriate)   11/15/18 1115 11/15/18 1531   Plan of Care Review   Progress --  no change   OTHER   Outcome Summary --  Pt is oriented to self only. She is actively delusional, calling out for her son and , who she says are \"waiting downstairs to get me\". She was irritable and combative with staff in am, trying to hit staff's foot with her walker. She has been verbally abusive at times, but cooperative at times as well. She remains in a posey vest to limit her mobility as she is a falls risk and doesnt acknowledge her limitations. Will continue to monitor pt for safety and any change in condition.   Coping/Psychosocial   Plan of Care Reviewed With patient --    Coping/Psychosocial   Patient Agreement with Plan of Care --  unable to participate       Problem: Overarching Goals (Adult)  Goal: Adheres to Safety Considerations for Self and Others  Outcome: Ongoing (interventions implemented as appropriate)   11/15/18 0504   Overarching Goals (Adult)   Adheres to Safety Considerations for Self and Others making progress toward outcome     Intervention: Develop and Maintain Individualized Safety Plan   11/15/18 1115 11/15/18 1400   Violence Risk   Feels Like Hurting Others no --    Previous Attempt to Harm Others no --    C-SSRS (Recent)   Wish to be Dead no --    Suicidal Thoughts no --    Suicide Behavior no --    Develop and Maintain Individualized Safety Plan   Safety Measures --  safety rounds completed       Goal: Optimized Coping Skills in Response to Life Stressors  Outcome: Ongoing (interventions implemented as appropriate)   11/15/18 1531   Overarching Goals (Adult)   Optimized Coping Skills in Response to Life Stressors making progress toward outcome     Intervention: Promote Effective Coping Strategies   11/15/18 1115   Coping/Psychosocial Interventions   Supportive Measures active listening " utilized;verbalization of feelings encouraged       Goal: Develops/Participates in Therapeutic [place] to Support Successful Transition  Outcome: Ongoing (interventions implemented as appropriate)   11/15/18 0504   Overarching Goals (Adult)   Develops/Participates in Therapeutic [place] to Support Successful Transition making progress toward outcome     Intervention: Foster Therapeutic [place]   11/15/18 1115   Interventions   Trust Relationship/Rapport care explained;choices provided;thoughts/feelings acknowledged;questions answered;questions encouraged     Intervention: Mutually Develop Transition Plan   11/15/18 1115   Mutually Develop Transition Plan   Transition Support crisis management plan promoted         Problem: Cognitive Impairment (Psychotic Signs/Symptoms) (Adult)  Goal: Improved Thought Clarity/Organization (Psychotic Signs/Symptoms)  Outcome: Ongoing (interventions implemented as appropriate)   11/14/18 2105 11/15/18 1531   Improved Thought Clarity/Organization (Psychotic Signs/Symptoms)   Improved Thought Clarity/Organization Action Step/Short Term Goal (STG) Established 11/12/18 --    Improved Thought Clarity/Organization Time Frame for Action Step (STG) --  1 day   Improved Thought Clarity/Organization Action Step (STG) Outcome --  making progress toward outcome       Problem: Mental State/Mood Impairment (Psychotic Signs/Symptoms) (Adult)  Goal: Improved Mental State/Mood (Psychotic Signs/Symptoms)  Outcome: Ongoing (interventions implemented as appropriate)   11/14/18 2105 11/15/18 1531   Improved Mental State/Mood (Psychotic Signs/Symptoms)   Improved Mental State/Mood Action Step/Short Term Goal (STG) Established 11/12/18 --    Improved Mental State/Mood Time Frame for Action Step (STG) --  1 day   Improved Mental State/Mood Action Step (STG) Outcome --  making progress toward outcome       Problem: Fall Risk (Adult)  Goal: Absence of Fall  Outcome: Ongoing (interventions implemented as  appropriate)   11/15/18 1531   Fall Risk (Adult)   Absence of Fall making progress toward outcome       Problem: Restraint, Nonbehavioral (Nonviolent)  Goal: Rationale and Justification  Outcome: Ongoing (interventions implemented as appropriate)   11/15/18 1531   Restraint, Nonbehavioral (Nonviolent)   Rationale and Justification failure of less restrictive safety measures;prevent harm to self     Goal: Nonbehavioral (Nonviolent) Restraint: Absence of Injury/Harm  Outcome: Ongoing (interventions implemented as appropriate)   11/15/18 1531   Restraint, Nonbehavioral (Nonviolent)   Nonbehavioral (Nonviolent) Restraint: Absence of Injury/Harm not met     Goal: Nonbehavioral (Nonviolent) Restraint: Achievement of Discontinuation Criteria  Outcome: Ongoing (interventions implemented as appropriate)   11/15/18 1531   Restraint, Nonbehavioral (Nonviolent)   Nonbehavioral (Nonviolent) Restraint: Achievement of Discontinuation Criteria not met     Goal: Nonbehavioral (Nonviolent) Restraint: Preservation of Dignity and Wellbeing  Outcome: Ongoing (interventions implemented as appropriate)   11/15/18 1531   Restraint, Nonbehavioral (Nonviolent)   Nonbehavioral (Nonviolent) Restraint: Preservation of Dignity and Wellbeing met       Problem: Skin Injury Risk (Adult)  Goal: Skin Health and Integrity  Outcome: Ongoing (interventions implemented as appropriate)   11/15/18 1531   Skin Injury Risk (Adult)   Skin Health and Integrity making progress toward outcome

## 2018-11-15 NOTE — SIGNIFICANT NOTE
11/15/18 1413   Rehab Time/Intention   Evaluation Not Performed patient/family declined evaluation  (Pt strongly refused PT this pm stating she was waiting for her brother to come pick her up. Pt began to become upset.  PT notified RN and will try to work w pt again tomorrow.)   Rehab Treatment   Discipline physical therapist   Recommendation   PT - Next Appointment 11/16/18

## 2018-11-15 NOTE — PLAN OF CARE
Problem: Overarching Goals (Adult)  Goal: Adheres to Safety Considerations for Self and Others  Outcome: Ongoing (interventions implemented as appropriate)   11/15/18 0504   Overarching Goals (Adult)   Adheres to Safety Considerations for Self and Others making progress toward outcome     Goal: Optimized Coping Skills in Response to Life Stressors  Outcome: Ongoing (interventions implemented as appropriate)   11/15/18 0504   Overarching Goals (Adult)   Optimized Coping Skills in Response to Life Stressors making progress toward outcome     Goal: Develops/Participates in Therapeutic [place] to Support Successful Transition  Outcome: Ongoing (interventions implemented as appropriate)   11/15/18 0504   Overarching Goals (Adult)   Develops/Participates in Therapeutic [place] to Support Successful Transition making progress toward outcome       Problem: Cognitive Impairment (Psychotic Signs/Symptoms) (Adult)  Goal: Improved Thought Clarity/Organization (Psychotic Signs/Symptoms)  Outcome: Ongoing (interventions implemented as appropriate)   11/15/18 0504   Improved Thought Clarity/Organization (Psychotic Signs/Symptoms)   Improved Thought Clarity/Organization Action Step (STG) Outcome making progress toward outcome       Problem: Mental State/Mood Impairment (Psychotic Signs/Symptoms) (Adult)  Goal: Improved Mental State/Mood (Psychotic Signs/Symptoms)  Outcome: Ongoing (interventions implemented as appropriate)   11/15/18 0504   Improved Mental State/Mood (Psychotic Signs/Symptoms)   Improved Mental State/Mood Action Step (STG) Outcome making progress toward outcome       Problem: Fall Risk (Adult)  Goal: Absence of Fall  Outcome: Ongoing (interventions implemented as appropriate)   11/15/18 0504   Fall Risk (Adult)   Absence of Fall making progress toward outcome       Problem: Restraint, Nonbehavioral (Nonviolent)  Goal: Rationale and Justification   11/15/18 0504   Restraint, Nonbehavioral (Nonviolent)   Rationale  and Justification prevent harm to self  Failure of less restrictive safety measures     Goal: Nonbehavioral (Nonviolent) Restraint: Absence of Injury/Harm   11/15/18 0504   Restraint, Nonbehavioral (Nonviolent)   Nonbehavioral (Nonviolent) Restraint: Absence of Injury/Harm met     Goal: Nonbehavioral (Nonviolent) Restraint: Achievement of Discontinuation Criteria   11/15/18 0504   Restraint, Nonbehavioral (Nonviolent)   Nonbehavioral (Nonviolent) Restraint: Achievement of Discontinuation Criteria not met     Goal: Nonbehavioral (Nonviolent) Restraint: Preservation of Dignity and Wellbeing  Outcome: Ongoing (interventions implemented as appropriate)   11/15/18 0504   Restraint, Nonbehavioral (Nonviolent)   Nonbehavioral (Nonviolent) Restraint: Preservation of Dignity and Wellbeing met       Problem: Skin Injury Risk (Adult)  Goal: Skin Health and Integrity  Outcome: Ongoing (interventions implemented as appropriate)   11/15/18 0504   Skin Injury Risk (Adult)   Skin Health and Integrity making progress toward outcome

## 2018-11-15 NOTE — PROGRESS NOTES
The patient continues to require posy restraint secondary to ongoing confusion and restlessness.  She has been occasionally resistant to hands on care but overall has not been too much of a management problem.  I we will add an a.m. dose of Zyprexa to her current medication regimen.

## 2018-11-16 PROBLEM — N39.0 UTI (URINARY TRACT INFECTION): Status: ACTIVE | Noted: 2018-01-01

## 2018-11-16 NOTE — PROGRESS NOTES
DAILY PROGRESS NOTE  [facility]    Patient Identification:  Name: Marie Delgado  Age: 89 y.o.  Sex: female  :  1929  MRN: [id number]         Primary Care Physician: Queenie Alas PA-C    Subjective:  Interval History:The patient is confused and poor oral intake.    Objective:    Scheduled Meds:    apixaban 2.5 mg Oral Q12H   atorvastatin 20 mg Oral Daily   vitamin D3 5,000 Units Oral Daily   Divalproex Sodium 250 mg Oral BID   donepezil 5 mg Oral Nightly   OLANZapine 2.5 mg Oral Daily   OLANZapine 5 mg Oral Nightly   propranolol 20 mg Oral BID     Continuous Infusions:     Vital signs in last 24 hours:  Temp:  [97.3 °F (36.3 °C)] 97.3 °F (36.3 °C)  Heart Rate:  [52-78] 74  Resp:  [15-18] 15  BP: ()/(52-72) 112/62    Intake/Output:    Intake/Output Summary (Last 24 hours) at 2018 1429  Last data filed at 11/15/2018 1812  Gross per 24 hour   Intake 390 ml   Output --   Net 390 ml       Exam:  /62 (BP Location: Left arm, Patient Position: Lying)   Pulse 74   Temp 97.3 °F (36.3 °C) (Oral)   Resp 15   Wt 62 kg (136 lb 11 oz)   LMP  (LMP Unknown)   SpO2 96%   BMI 20.18 kg/m²     General Appearance:    Alert, cooperative, no distress   Head:    Normocephalic, without obvious abnormality, atraumatic   Eyes:       Throat:   Lips, tongue, gums normal   Neck:   Supple, symmetrical, trachea midline, no JVD   Lungs:     Clear to auscultation bilaterally, respirations unlabored   Chest Wall:    No tenderness or deformity    Heart:    Regular rate and rhythm, S1 and S2 normal, no murmur,no  Rub or gallop   Abdomen:     Soft, non-tender, bowel sounds active, no masses, no organomegaly    Extremities:   Extremities normal, atraumatic, no cyanosis or edema   Pulses:      Skin:   Skin is warm and dry,  no rashes or palpable lesions   Neurologic:   no focal deficits noted, confused and demented.      Lab Results (last 72 hours)     Procedure Component Value Units Date/Time    Urinalysis  With Microscopic If Indicated (No Culture) - Urine, Clean Catch [[id number]]  (Abnormal) Collected:  11/15/18 0556    Specimen:  Urine, Clean Catch Updated:  11/15/18 0726     Color, UA Dark Yellow     Appearance, UA Turbid     pH, UA <=5.0     Specific Gravity, UA 1.016     Glucose, UA Negative     Ketones, UA Trace     Bilirubin, UA Negative     Blood, UA Large (3+)     Protein,  mg/dL (2+)     Leuk Esterase, UA Large (3+)     Nitrite, UA Negative     Urobilinogen, UA 1.0 E.U./dL    Urinalysis, Microscopic Only - Urine, Clean Catch [[id number]]  (Abnormal) Collected:  11/15/18 0556    Specimen:  Urine, Clean Catch Updated:  11/15/18 0726     RBC, UA Too Numerous to Count /HPF      WBC, UA 21-30 /HPF      Bacteria, UA 2+ /HPF      Squamous Epithelial Cells, UA 7-12 /HPF      Transitional Epithelial Cells, UA 0-2 /HPF      Hyaline Casts, UA None Seen /LPF      Methodology Manual Light Microscopy        Data Review:  Results from last 7 days   Lab Units  11/16/18   0759  11/11/18   1531   SODIUM mmol/L  142  143   POTASSIUM mmol/L  3.4*  3.6   CHLORIDE mmol/L  100  102   CO2 mmol/L  24.5  26.1   BUN mg/dL  38*  13   CREATININE mg/dL  1.64*  1.07*   GLUCOSE mg/dL  98  152*   CALCIUM mg/dL  9.6  9.6     Results from last 7 days   Lab Units  11/16/18   0759  11/11/18   1531   WBC 10*3/mm3  8.12  7.93   HEMOGLOBIN g/dL  12.8  14.1   HEMATOCRIT %  39.5  41.9   PLATELETS 10*3/mm3  203  258     Results from last 7 days   Lab Units  11/12/18   0439  11/11/18   1531   TSH mIU/mL   --   3.960   FREE T4 ng/dL  1.53   --      Results from last 7 days   Lab Units  11/12/18   0716   HEMOGLOBIN A1C %  5.00     Lab Results   Lab Value Date/Time    TROPONINT <0.010 08/18/2018 0011    TROPONINT <0.010 04/04/2018 1208    TROPONINT <0.010 04/04/2018 0848    TROPONINT <0.010 04/04/2018 0217    TROPONINT <0.010 12/05/2016 0023         Results from last 7 days   Lab Units  11/11/18   1531   ALK PHOS U/L  87   BILIRUBIN mg/dL  1.1   ALT  (SGPT) U/L  10   AST (SGOT) U/L  13     Results from last 7 days   Lab Units  11/12/18   0439  11/11/18   1531   TSH mIU/mL   --   3.960   FREE T4 ng/dL  1.53   --      Results from last 7 days   Lab Units  11/12/18   0716   HEMOGLOBIN A1C %  5.00     No results found for: POCGLU        Past Medical History:   Diagnosis Date   • Arthritis    • Basal cell adenocarcinoma     on her nose   • CHF (congestive heart failure) (CMS/[facility])    • Hyperlipidemia    • Hypertension    • Onychomycosis    • Vitamin D deficiency disease        Assessment:  Active Hospital Problems    Diagnosis Date Noted   • **Psychosis (CMS/[facility]) [F29] 11/11/2018   • UTI (urinary tract infection) [N39.0] 11/16/2018   • CHF (congestive heart failure) (CMS/[facility]) - histroy [I50.9] 11/11/2018   • Chronic atrial fibrillation (CMS/[facility]) [I48.2] 11/11/2018   • Acquired hypothyroidism [E03.9] 04/19/2018   • CAD (coronary artery disease) [I25.10] 04/09/2018   • Vitamin D deficiency disease [E55.9] 07/25/2017   • Venous insufficiency [I87.2] 08/17/2016   • Hypertension [I10] 02/10/2016   • Hyperlipidemia [E78.5] 02/10/2016      Resolved Hospital Problems   No resolved problems to display.       Plan:  Will start some IV fluids and antibiotics for a few days..  Get some follow up lab.  Discussed with nurse.  Will follow.    El Canales MD  11/16/2018  2:29 PM

## 2018-11-16 NOTE — PLAN OF CARE
Problem: Patient Care Overview  Goal: Plan of Care Review  Outcome: Ongoing (interventions implemented as appropriate)   11/16/18 0316 11/16/18 0329   Plan of Care Review   Progress --  no change   OTHER   Outcome Summary --  A&O to self only. Cooperative and med compliant. Will continue to monitor and assess.    Coping/Psychosocial   Plan of Care Reviewed With patient --    Coping/Psychosocial   Patient Agreement with Plan of Care unable to participate --        Problem: Overarching Goals (Adult)  Goal: Adheres to Safety Considerations for Self and Others  Outcome: Ongoing (interventions implemented as appropriate)    Goal: Optimized Coping Skills in Response to Life Stressors  Outcome: Ongoing (interventions implemented as appropriate)    Goal: Develops/Participates in Therapeutic [place] to Support Successful Transition  Outcome: Ongoing (interventions implemented as appropriate)      Problem: Cognitive Impairment (Psychotic Signs/Symptoms) (Adult)  Goal: Improved Thought Clarity/Organization (Psychotic Signs/Symptoms)  Outcome: Ongoing (interventions implemented as appropriate)      Problem: Mental State/Mood Impairment (Psychotic Signs/Symptoms) (Adult)  Goal: Improved Mental State/Mood (Psychotic Signs/Symptoms)  Outcome: Ongoing (interventions implemented as appropriate)      Problem: Fall Risk (Adult)  Goal: Absence of Fall  Outcome: Ongoing (interventions implemented as appropriate)      Problem: Restraint, Nonbehavioral (Nonviolent)  Goal: Rationale and Justification  Outcome: Ongoing (interventions implemented as appropriate)    Goal: Nonbehavioral (Nonviolent) Restraint: Absence of Injury/Harm  Outcome: Ongoing (interventions implemented as appropriate)    Goal: Nonbehavioral (Nonviolent) Restraint: Achievement of Discontinuation Criteria  Outcome: Ongoing (interventions implemented as appropriate)    Goal: Nonbehavioral (Nonviolent) Restraint: Preservation of Dignity and Wellbeing  Outcome: Ongoing  (interventions implemented as appropriate)      Problem: Skin Injury Risk (Adult)  Goal: Skin Health and Integrity  Outcome: Ongoing (interventions implemented as appropriate)

## 2018-11-16 NOTE — SIGNIFICANT NOTE
11/16/18 1230   Rehab Time/Intention   Evaluation Not Performed other (see comments)  (per RN the pt. is not appropriate for OT at this time )   Rehab Treatment   Discipline occupational therapist   Recommendation   OT - Next Appointment 11/19/18

## 2018-11-16 NOTE — PLAN OF CARE
Problem: Patient Care Overview  Goal: Individualization and Mutuality  Outcome: Ongoing (interventions implemented as appropriate)    Goal: Interprofessional Rounds/Family Conf  Outcome: Ongoing (interventions implemented as appropriate)   11/16/18 1142   Interdisciplinary Rounds/Family Conf   Summary Treatment team met to review pt's plan of care. SW will contact pt's family concerning living arrangements upon d/c due to neuropsych test results. Pt's progress & svcs needed will be reviewed ongoing.   Interdisciplinary Rounds/Family Conf   Participants art therapy;;nursing;psychiatrist;pharmacy;social work     Patient/Guardian Signature: __________________________________            Psychiatrist Signature: ______________________________________             Therapist Signature: ________________________________________         Nurse Signature: ___________________________________________          Staff Signature: ____________________________________________            Staff Signature: ____________________________________________          Staff Signature: ____________________________________________          Staff Signature:

## 2018-11-16 NOTE — SIGNIFICANT NOTE
11/16/18 1524   Rehab Time/Intention   Evaluation Not Performed other (see comments)  (Multiple attempts to initiate evaluation over the last 5 days. Pt continues to refuse PT despite encouragement; not appropriate at this time. Will sign off; please reconsult when patient more appropriate and agreeable. )   Rehab Treatment   Discipline physical therapist

## 2018-11-16 NOTE — SIGNIFICANT NOTE
11/16/18 1255   Rehab Time/Intention   Evaluation Not Performed patient/family declined evaluation  (Pt refused PT 2 x in am. )   Rehab Treatment   Discipline physical therapist   Reason Treatment Not Performed patient/family declined treatment

## 2018-11-16 NOTE — PROGRESS NOTES
The patient continues to require posy restraint secondary to episodes of irritability and combative behavior.  She is resting complete this morning.  I we will increase her Depakote to 250 mg twice daily as her problematic behaviors are persisting at this point.

## 2018-11-16 NOTE — PLAN OF CARE
Problem: Patient Care Overview  Goal: Plan of Care Review  Outcome: Ongoing (interventions implemented as appropriate)   11/16/18 1540   Plan of Care Review   Progress no change   OTHER   Outcome Summary Pt remains in posey vest for safety. Refusing to get up and out of bed today. Refusing meds, food and drink. Seen by Dr. Canales and IVF's to be started. Contiues to hallucinate. Only oriented to self. Irritable    Coping/Psychosocial   Plan of Care Reviewed With patient   Coping/Psychosocial   Patient Agreement with Plan of Care unable to participate       Problem: Overarching Goals (Adult)  Goal: Adheres to Safety Considerations for Self and Others  Outcome: Ongoing (interventions implemented as appropriate)   11/16/18 1540   Overarching Goals (Adult)   Adheres to Safety Considerations for Self and Others making progress toward outcome     Goal: Optimized Coping Skills in Response to Life Stressors  Outcome: Ongoing (interventions implemented as appropriate)    Goal: Develops/Participates in Therapeutic [place] to Support Successful Transition  Outcome: Ongoing (interventions implemented as appropriate)   11/16/18 1540   Overarching Goals (Adult)   Develops/Participates in Therapeutic [place] to Support Successful Transition making progress toward outcome      11/16/18 1540   Overarching Goals (Adult)   Develops/Participates in Therapeutic [place] to Support Successful Transition making progress toward outcome       Problem: Cognitive Impairment (Psychotic Signs/Symptoms) (Adult)  Goal: Improved Thought Clarity/Organization (Psychotic Signs/Symptoms)  Outcome: Ongoing (interventions implemented as appropriate)   11/16/18 1540   Improved Thought Clarity/Organization (Psychotic Signs/Symptoms)   Improved Thought Clarity/Organization Action Step/Short Term Goal (STG) Established 11/16/18   Improved Thought Clarity/Organization Time Frame for Action Step (STG) 4 days   Improved Thought Clarity/Organization Action  Step (STG) Outcome making progress toward outcome       Problem: Mental State/Mood Impairment (Psychotic Signs/Symptoms) (Adult)  Goal: Improved Mental State/Mood (Psychotic Signs/Symptoms)  Outcome: Ongoing (interventions implemented as appropriate)   11/16/18 1540   Improved Mental State/Mood (Psychotic Signs/Symptoms)   Improved Mental State/Mood Action Step/Short Term Goal (STG) Established 11/16/18   Improved Mental State/Mood Time Frame for Action Step (STG) 4 days   Improved Mental State/Mood Action Step (STG) Outcome making progress toward outcome       Problem: Fall Risk (Adult)  Goal: Absence of Fall  Outcome: Ongoing (interventions implemented as appropriate)   11/16/18 1540   Fall Risk (Adult)   Absence of Fall making progress toward outcome       Problem: Restraint, Nonbehavioral (Nonviolent)  Goal: Rationale and Justification  Outcome: Ongoing (interventions implemented as appropriate)    Goal: Nonbehavioral (Nonviolent) Restraint: Absence of Injury/Harm  Outcome: Ongoing (interventions implemented as appropriate)    Goal: Nonbehavioral (Nonviolent) Restraint: Achievement of Discontinuation Criteria  Outcome: Ongoing (interventions implemented as appropriate)    Goal: Nonbehavioral (Nonviolent) Restraint: Preservation of Dignity and Wellbeing  Outcome: Ongoing (interventions implemented as appropriate)   11/16/18 1540   Restraint, Nonbehavioral (Nonviolent)   Nonbehavioral (Nonviolent) Restraint: Preservation of Dignity and Wellbeing not met       Problem: Skin Injury Risk (Adult)  Goal: Skin Health and Integrity  Outcome: Ongoing (interventions implemented as appropriate)   11/16/18 1540   Skin Injury Risk (Adult)   Skin Health and Integrity making progress toward outcome

## 2018-11-17 NOTE — NURSING NOTE
RN was unable to obtain pulse ox reading on patient tonight. Patient's hands cold and head pulse ox would not stay on forehead. Patient never in respiratory distress, or had any labored breathing. Patient resting comfortably and relaxed. Charge Lis Amos RN notified. Patient Alert, walked to the bathroom without distress or labored breathing. Patient comfortable all night and resting.

## 2018-11-17 NOTE — PROGRESS NOTES
"The patient request discharge home today and answers \"Oh, shit\" when told it would not take place.  She continues to require a posey restraint.  We continue an effort to adjust the patient's medications to alleviate the need for the Posey.  "

## 2018-11-17 NOTE — PLAN OF CARE
Problem: Patient Care Overview  Goal: Plan of Care Review  Outcome: Ongoing (interventions implemented as appropriate)   11/17/18 0654   Plan of Care Review   Progress no change   OTHER   Outcome Summary Patient rested comfortably tonight. Patient remains in Posey vest for safety. Patient using bathroom without difficulty. Patient continues to talk to self and only be oriented to self. Patient remains on IVF Continue to monitor closely, continue with current plan of care.   Coping/Psychosocial   Plan of Care Reviewed With patient   Coping/Psychosocial   Patient Agreement with Plan of Care unable to participate       Problem: Overarching Goals (Adult)  Goal: Adheres to Safety Considerations for Self and Others  Outcome: Ongoing (interventions implemented as appropriate)    Goal: Optimized Coping Skills in Response to Life Stressors  Outcome: Ongoing (interventions implemented as appropriate)    Goal: Develops/Participates in Therapeutic [place] to Support Successful Transition  Outcome: Ongoing (interventions implemented as appropriate)      Problem: Cognitive Impairment (Psychotic Signs/Symptoms) (Adult)  Goal: Improved Thought Clarity/Organization (Psychotic Signs/Symptoms)  Outcome: Ongoing (interventions implemented as appropriate)      Problem: Mental State/Mood Impairment (Psychotic Signs/Symptoms) (Adult)  Goal: Improved Mental State/Mood (Psychotic Signs/Symptoms)  Outcome: Ongoing (interventions implemented as appropriate)      Problem: Fall Risk (Adult)  Goal: Absence of Fall  Outcome: Ongoing (interventions implemented as appropriate)      Problem: Restraint, Nonbehavioral (Nonviolent)  Goal: Rationale and Justification  Outcome: Ongoing (interventions implemented as appropriate)    Goal: Nonbehavioral (Nonviolent) Restraint: Absence of Injury/Harm  Outcome: Ongoing (interventions implemented as appropriate)    Goal: Nonbehavioral (Nonviolent) Restraint: Achievement of Discontinuation Criteria  Outcome:  Ongoing (interventions implemented as appropriate)    Goal: Nonbehavioral (Nonviolent) Restraint: Preservation of Dignity and Wellbeing  Outcome: Ongoing (interventions implemented as appropriate)      Problem: Skin Injury Risk (Adult)  Goal: Skin Health and Integrity  Outcome: Ongoing (interventions implemented as appropriate)

## 2018-11-17 NOTE — PROGRESS NOTES
DAILY PROGRESS NOTE  [facility]    Patient Identification:  Name: Marie Delgado  Age: 89 y.o.  Sex: female  :  1929  MRN: [id number]         Primary Care Physician: Queenie Alas PA-C    Subjective:  Interval History:The patient is confused and poor oral intake.    Objective:    Scheduled Meds:    apixaban 2.5 mg Oral Q12H   atorvastatin 20 mg Oral Daily   ceftriaxone 1 g Intravenous Q24H   vitamin D3 5,000 Units Oral Daily   Divalproex Sodium 250 mg Oral BID   donepezil 5 mg Oral Nightly   OLANZapine 2.5 mg Oral Daily   OLANZapine 5 mg Oral Nightly   propranolol 20 mg Oral BID     Continuous Infusions:    sodium chloride 100 mL/hr Last Rate: 100 mL/hr (18 0326)       Vital signs in last 24 hours:  Temp:  [96.1 °F (35.6 °C)-97.3 °F (36.3 °C)] 97.3 °F (36.3 °C)  Heart Rate:  [52-97] 69  Resp:  [16] 16  BP: ()/(64-78) 118/72    Intake/Output:    Intake/Output Summary (Last 24 hours) at 2018 1004  Last data filed at 2018 1746  Gross per 24 hour   Intake 50 ml   Output --   Net 50 ml       Exam:  /72   Pulse 69   Temp 97.3 °F (36.3 °C) (Oral)   Resp 16   Wt 62 kg (136 lb 11 oz)   LMP  (LMP Unknown)   SpO2 97%   BMI 20.18 kg/m²     General Appearance:    Alert, cooperative, no distress   Head:    Normocephalic, without obvious abnormality, atraumatic   Eyes:       Throat:   Lips, tongue, gums normal   Neck:   Supple, symmetrical, trachea midline, no JVD   Lungs:     Clear to auscultation bilaterally, respirations unlabored   Chest Wall:    No tenderness or deformity    Heart:    Regular rate and rhythm, S1 and S2 normal, no murmur,no  Rub or gallop   Abdomen:     Soft, non-tender, bowel sounds active, no masses, no organomegaly    Extremities:   Extremities normal, atraumatic, no cyanosis or edema   Pulses:      Skin:   Skin is warm and dry,  no rashes or palpable lesions   Neurologic:   no focal deficits noted, confused and demented.      Lab Results (last 72  hours)     Procedure Component Value Units Date/Time    Urinalysis With Microscopic If Indicated (No Culture) - Urine, Clean Catch [[id number]]  (Abnormal) Collected:  11/15/18 0556    Specimen:  Urine, Clean Catch Updated:  11/15/18 0726     Color, UA Dark Yellow     Appearance, UA Turbid     pH, UA <=5.0     Specific Gravity, UA 1.016     Glucose, UA Negative     Ketones, UA Trace     Bilirubin, UA Negative     Blood, UA Large (3+)     Protein,  mg/dL (2+)     Leuk Esterase, UA Large (3+)     Nitrite, UA Negative     Urobilinogen, UA 1.0 E.U./dL    Urinalysis, Microscopic Only - Urine, Clean Catch [[id number]]  (Abnormal) Collected:  11/15/18 0556    Specimen:  Urine, Clean Catch Updated:  11/15/18 0726     RBC, UA Too Numerous to Count /HPF      WBC, UA 21-30 /HPF      Bacteria, UA 2+ /HPF      Squamous Epithelial Cells, UA 7-12 /HPF      Transitional Epithelial Cells, UA 0-2 /HPF      Hyaline Casts, UA None Seen /LPF      Methodology Manual Light Microscopy        Data Review:  Results from last 7 days   Lab Units  11/17/18   0747  11/16/18   0759  11/11/18   1531   SODIUM mmol/L  145  142  143   POTASSIUM mmol/L  3.5  3.4*  3.6   CHLORIDE mmol/L  103  100  102   CO2 mmol/L  24.4  24.5  26.1   BUN mg/dL  39*  38*  13   CREATININE mg/dL  1.41*  1.64*  1.07*   GLUCOSE mg/dL  106*  98  152*   CALCIUM mg/dL  9.0  9.6  9.6     Results from last 7 days   Lab Units  11/17/18   0747  11/16/18   0759  11/11/18   1531   WBC 10*3/mm3  7.62  8.12  7.93   HEMOGLOBIN g/dL  12.5  12.8  14.1   HEMATOCRIT %  38.7  39.5  41.9   PLATELETS 10*3/mm3  161  203  258     Results from last 7 days   Lab Units  11/12/18   0439  11/11/18   1531   TSH mIU/mL   --   3.960   FREE T4 ng/dL  1.53   --      Results from last 7 days   Lab Units  11/12/18   0716   HEMOGLOBIN A1C %  5.00     Lab Results   Lab Value Date/Time    TROPONINT <0.010 08/18/2018 0011    TROPONINT <0.010 04/04/2018 1208    TROPONINT <0.010 04/04/2018 0848    TROPONINT  <0.010 04/04/2018 0217    TROPONINT <0.010 12/05/2016 0023         Results from last 7 days   Lab Units  11/11/18   1531   ALK PHOS U/L  87   BILIRUBIN mg/dL  1.1   ALT (SGPT) U/L  10   AST (SGOT) U/L  13     Results from last 7 days   Lab Units  11/12/18   0439  11/11/18   1531   TSH mIU/mL   --   3.960   FREE T4 ng/dL  1.53   --      Results from last 7 days   Lab Units  11/12/18   0716   HEMOGLOBIN A1C %  5.00     No results found for: POCGLU        Past Medical History:   Diagnosis Date   • Arthritis    • Basal cell adenocarcinoma     on her nose   • CHF (congestive heart failure) (CMS/[facility])    • Hyperlipidemia    • Hypertension    • Onychomycosis    • Vitamin D deficiency disease        Assessment:  Active Hospital Problems    Diagnosis Date Noted   • **Psychosis (CMS/[facility]) [F29] 11/11/2018   • UTI (urinary tract infection) [N39.0] 11/16/2018   • CHF (congestive heart failure) (CMS/[facility]) - histroy [I50.9] 11/11/2018   • Chronic atrial fibrillation (CMS/[facility]) [I48.2] 11/11/2018   • Acquired hypothyroidism [E03.9] 04/19/2018   • CAD (coronary artery disease) [I25.10] 04/09/2018   • Vitamin D deficiency disease [E55.9] 07/25/2017   • Venous insufficiency [I87.2] 08/17/2016   • Hypertension [I10] 02/10/2016   • Hyperlipidemia [E78.5] 02/10/2016      Resolved Hospital Problems   No resolved problems to display.       Plan:  Continue IV fluids and antibiotics for a few days..  Get some follow up lab.  Discussed with nurse.  Will follow.    El Canales MD  11/17/2018  10:04 AM

## 2018-11-18 NOTE — NURSING NOTE
The patient was explained reasoning for posey vest restraint. She was also explained criteria for taking off posey and leaving off. Patient agreeable. [place] vest restraint removed.

## 2018-11-18 NOTE — PROGRESS NOTES
DAILY PROGRESS NOTE  [facility]    Patient Identification:  Name: Marie Delgado  Age: 89 y.o.  Sex: female  :  1929  MRN: [id number]         Primary Care Physician: Queenie Alas PA-C    Subjective:  Interval History:The patient is confused and poor oral intake.  More alert today.    Objective:    Scheduled Meds:    apixaban 2.5 mg Oral Q12H   atorvastatin 20 mg Oral Daily   ceftriaxone 1 g Intravenous Q24H   vitamin D3 5,000 Units Oral Daily   Divalproex Sodium 250 mg Oral BID   donepezil 5 mg Oral Nightly   OLANZapine 2.5 mg Oral Daily   OLANZapine 5 mg Oral Nightly   propranolol 20 mg Oral BID     Continuous Infusions:    sodium chloride 100 mL/hr Last Rate: 100 mL/hr (18 0852)       Vital signs in last 24 hours:  Temp:  [96.7 °F (35.9 °C)-98.5 °F (36.9 °C)] 98.5 °F (36.9 °C)  Heart Rate:  [50-78] 73  Resp:  [16-18] 16  BP: ()/(58-73) 106/71    Intake/Output:    Intake/Output Summary (Last 24 hours) at 2018 1057  Last data filed at 2018 1415  Gross per 24 hour   Intake 2048.33 ml   Output --   Net 2048.33 ml       Exam:  /71 (BP Location: Left arm, Patient Position: Lying)   Pulse 73   Temp 98.5 °F (36.9 °C) (Oral)   Resp 16   Wt 62 kg (136 lb 11 oz)   LMP  (LMP Unknown)   SpO2 98%   BMI 20.18 kg/m²     General Appearance:    Alert, cooperative, no distress   Head:    Normocephalic, without obvious abnormality, atraumatic   Eyes:       Throat:   Lips, tongue, gums normal   Neck:   Supple, symmetrical, trachea midline, no JVD   Lungs:     Clear to auscultation bilaterally, respirations unlabored   Chest Wall:    No tenderness or deformity    Heart:    Regular rate and rhythm, S1 and S2 normal, no murmur,no  Rub or gallop   Abdomen:     Soft, non-tender, bowel sounds active, no masses, no organomegaly    Extremities:   Extremities normal, atraumatic, no cyanosis or edema   Pulses:      Skin:   Skin is warm and dry,  no rashes or palpable lesions    Neurologic:   no focal deficits noted, confused and demented.      Lab Results (last 72 hours)     Procedure Component Value Units Date/Time    Urinalysis With Microscopic If Indicated (No Culture) - Urine, Clean Catch [[id number]]  (Abnormal) Collected:  11/15/18 0556    Specimen:  Urine, Clean Catch Updated:  11/15/18 0726     Color, UA Dark Yellow     Appearance, UA Turbid     pH, UA <=5.0     Specific Gravity, UA 1.016     Glucose, UA Negative     Ketones, UA Trace     Bilirubin, UA Negative     Blood, UA Large (3+)     Protein,  mg/dL (2+)     Leuk Esterase, UA Large (3+)     Nitrite, UA Negative     Urobilinogen, UA 1.0 E.U./dL    Urinalysis, Microscopic Only - Urine, Clean Catch [[id number]]  (Abnormal) Collected:  11/15/18 0556    Specimen:  Urine, Clean Catch Updated:  11/15/18 0726     RBC, UA Too Numerous to Count /HPF      WBC, UA 21-30 /HPF      Bacteria, UA 2+ /HPF      Squamous Epithelial Cells, UA 7-12 /HPF      Transitional Epithelial Cells, UA 0-2 /HPF      Hyaline Casts, UA None Seen /LPF      Methodology Manual Light Microscopy        Data Review:  Results from last 7 days   Lab Units  11/18/18   0440  11/17/18   0747  11/16/18   0759   SODIUM mmol/L  147*  145  142   POTASSIUM mmol/L  3.4*  3.5  3.4*   CHLORIDE mmol/L  109*  103  100   CO2 mmol/L  23.9  24.4  24.5   BUN mg/dL  33*  39*  38*   CREATININE mg/dL  1.23*  1.41*  1.64*   GLUCOSE mg/dL  100*  106*  98   CALCIUM mg/dL  8.9  9.0  9.6     Results from last 7 days   Lab Units  11/18/18   0440  11/17/18   0747  11/16/18   0759   WBC 10*3/mm3  6.33  7.62  8.12   HEMOGLOBIN g/dL  11.2*  12.5  12.8   HEMATOCRIT %  36.2  38.7  39.5   PLATELETS 10*3/mm3  145  161  203     Results from last 7 days   Lab Units  11/12/18   0439  11/11/18   1531   TSH mIU/mL   --   3.960   FREE T4 ng/dL  1.53   --      Results from last 7 days   Lab Units  11/12/18   0716   HEMOGLOBIN A1C %  5.00     Lab Results   Lab Value Date/Time    TROPONINT <0.010  08/18/2018 0011    TROPONINT <0.010 04/04/2018 1208    TROPONINT <0.010 04/04/2018 0848    TROPONINT <0.010 04/04/2018 0217    TROPONINT <0.010 12/05/2016 0023         Results from last 7 days   Lab Units  11/11/18   1531   ALK PHOS U/L  87   BILIRUBIN mg/dL  1.1   ALT (SGPT) U/L  10   AST (SGOT) U/L  13     Results from last 7 days   Lab Units  11/12/18   0439  11/11/18   1531   TSH mIU/mL   --   3.960   FREE T4 ng/dL  1.53   --      Results from last 7 days   Lab Units  11/12/18   0716   HEMOGLOBIN A1C %  5.00     No results found for: POCGLU        Past Medical History:   Diagnosis Date   • Arthritis    • Basal cell adenocarcinoma     on her nose   • CHF (congestive heart failure) (CMS/[facility])    • Hyperlipidemia    • Hypertension    • Onychomycosis    • Vitamin D deficiency disease        Assessment:  Active Hospital Problems    Diagnosis Date Noted   • **Psychosis (CMS/[facility]) [F29] 11/11/2018   • UTI (urinary tract infection) [N39.0] 11/16/2018   • CHF (congestive heart failure) (CMS/[facility]) - histroy [I50.9] 11/11/2018   • Chronic atrial fibrillation (CMS/[facility]) [I48.2] 11/11/2018   • Acquired hypothyroidism [E03.9] 04/19/2018   • CAD (coronary artery disease) [I25.10] 04/09/2018   • Vitamin D deficiency disease [E55.9] 07/25/2017   • Venous insufficiency [I87.2] 08/17/2016   • Hypertension [I10] 02/10/2016   • Hyperlipidemia [E78.5] 02/10/2016      Resolved Hospital Problems   No resolved problems to display.       Plan:  Continue IV fluids and antibiotics for a few days..  Get some follow up lab.  Discussed with nurse.  Will follow. Change to D5.45ns.    El Canales MD  11/18/2018  10:57 AM

## 2018-11-18 NOTE — PLAN OF CARE
Problem: Patient Care Overview  Goal: Plan of Care Review  Outcome: Ongoing (interventions implemented as appropriate)   11/17/18 2029   Plan of Care Review   Progress no change   OTHER   Outcome Summary PT is confused and in posey vest for safety. Talks to self and orieintated to self only. Appears frustrated. Compliant with medications.. Will continue to monitor behavior and provide support..   Coping/Psychosocial   Plan of Care Reviewed With patient   Coping/Psychosocial   Patient Agreement with Plan of Care unable to participate     Goal: Individualization and Mutuality  Outcome: Ongoing (interventions implemented as appropriate)    Goal: Discharge Needs Assessment  Outcome: Ongoing (interventions implemented as appropriate)    Goal: Interprofessional Rounds/Family Conf  Outcome: Ongoing (interventions implemented as appropriate)

## 2018-11-18 NOTE — PLAN OF CARE
Problem: Patient Care Overview  Goal: Plan of Care Review  Outcome: Ongoing (interventions implemented as appropriate)   11/18/18 1748   Plan of Care Review   Progress improving   OTHER   Outcome Summary The patient is confused and oriented to self and place. the patient denied any anxiety, depression, SI, HI, pain, or hallucinations. the patient has stayed in bed throughout the day. Has tolerated her IV fluids well. She ha also tolerated being out of the posey vest restraint. The patient has had poor oral intake despite much encouragement to eat. Cooperative with medications. continuing to monitor.    Coping/Psychosocial   Plan of Care Reviewed With patient   Coping/Psychosocial   Patient Agreement with Plan of Care agrees     Goal: Individualization and Mutuality  Outcome: Ongoing (interventions implemented as appropriate)    Goal: Discharge Needs Assessment  Outcome: Ongoing (interventions implemented as appropriate)    Goal: Interprofessional Rounds/Family Conf  Outcome: Ongoing (interventions implemented as appropriate)      Problem: Overarching Goals (Adult)  Goal: Adheres to Safety Considerations for Self and Others  Outcome: Ongoing (interventions implemented as appropriate)   11/18/18 1748   Overarching Goals (Adult)   Adheres to Safety Considerations for Self and Others making progress toward outcome     Intervention: Develop and Maintain Individualized Safety Plan   11/18/18 1025   C-SSRS (Recent)   Wish to be Dead no   Suicidal Thoughts no   Suicide Behavior no   Violence Risk   Feels Like Hurting Others no   Develop and Maintain Individualized Safety Plan   Safety Measures safety rounds completed;suicide assessment completed       Goal: Optimized Coping Skills in Response to Life Stressors  Outcome: Ongoing (interventions implemented as appropriate)   11/18/18 1748   Overarching Goals (Adult)   Optimized Coping Skills in Response to Life Stressors making progress toward outcome     Intervention: Promote  Effective Coping Strategies   11/18/18 1025   Coping/Psychosocial Interventions   Supportive Measures active listening utilized;relaxation techniques promoted;self-care encouraged;self-reflection promoted;self-responsibility promoted;verbalization of feelings encouraged       Goal: Develops/Participates in Therapeutic [place] to Support Successful Transition  Outcome: Ongoing (interventions implemented as appropriate)   11/18/18 1748   Overarching Goals (Adult)   Develops/Participates in Therapeutic [place] to Support Successful Transition making progress toward outcome     Intervention: Foster Therapeutic [place]   11/18/18 1025   Interventions   Trust Relationship/Rapport care explained;choices provided;emotional support provided;empathic listening provided;questions answered;questions encouraged;reassurance provided;thoughts/feelings acknowledged         Problem: Cognitive Impairment (Psychotic Signs/Symptoms) (Adult)  Goal: Improved Thought Clarity/Organization (Psychotic Signs/Symptoms)  Outcome: Ongoing (interventions implemented as appropriate)   11/18/18 1748   Improved Thought Clarity/Organization (Psychotic Signs/Symptoms)   Improved Thought Clarity/Organization Time Frame for Action Step (STG) 2 days   Improved Thought Clarity/Organization Action Step (STG) Outcome making progress toward outcome       Problem: Mental State/Mood Impairment (Psychotic Signs/Symptoms) (Adult)  Goal: Improved Mental State/Mood (Psychotic Signs/Symptoms)  Outcome: Ongoing (interventions implemented as appropriate)   11/18/18 1748   Improved Mental State/Mood (Psychotic Signs/Symptoms)   Improved Mental State/Mood Time Frame for Action Step (STG) 2 days   Improved Mental State/Mood Action Step (STG) Outcome making progress toward outcome       Problem: Fall Risk (Adult)  Goal: Absence of Fall  Outcome: Ongoing (interventions implemented as appropriate)   11/18/18 1748   Fall Risk (Adult)   Absence of Fall making progress  toward outcome       Problem: Skin Injury Risk (Adult)  Goal: Skin Health and Integrity  Outcome: Ongoing (interventions implemented as appropriate)   11/18/18 9990   Skin Injury Risk (Adult)   Skin Health and Integrity making progress toward outcome

## 2018-11-18 NOTE — PLAN OF CARE
Problem: Patient Care Overview  Goal: Plan of Care Review  Outcome: Ongoing (interventions implemented as appropriate)   11/18/18 0150 11/18/18 0344   Plan of Care Review   Progress --  no change   OTHER   Outcome Summary --  Pt oriented to self only. Compliant with medications. Pt appears frustrated and remains in posey vest for safety. Will continue to monitor.   Coping/Psychosocial   Plan of Care Reviewed With patient --    Coping/Psychosocial   Patient Agreement with Plan of Care unable to participate --        Problem: Overarching Goals (Adult)  Goal: Adheres to Safety Considerations for Self and Others  Outcome: Ongoing (interventions implemented as appropriate)    Goal: Optimized Coping Skills in Response to Life Stressors  Outcome: Ongoing (interventions implemented as appropriate)    Goal: Develops/Participates in Therapeutic [place] to Support Successful Transition  Outcome: Ongoing (interventions implemented as appropriate)      Problem: Cognitive Impairment (Psychotic Signs/Symptoms) (Adult)  Goal: Improved Thought Clarity/Organization (Psychotic Signs/Symptoms)  Outcome: Ongoing (interventions implemented as appropriate)      Problem: Mental State/Mood Impairment (Psychotic Signs/Symptoms) (Adult)  Goal: Improved Mental State/Mood (Psychotic Signs/Symptoms)  Outcome: Ongoing (interventions implemented as appropriate)      Problem: Fall Risk (Adult)  Goal: Absence of Fall  Outcome: Ongoing (interventions implemented as appropriate)      Problem: Restraint, Nonbehavioral (Nonviolent)  Goal: Rationale and Justification  Outcome: Ongoing (interventions implemented as appropriate)    Goal: Nonbehavioral (Nonviolent) Restraint: Absence of Injury/Harm  Outcome: Ongoing (interventions implemented as appropriate)    Goal: Nonbehavioral (Nonviolent) Restraint: Achievement of Discontinuation Criteria  Outcome: Ongoing (interventions implemented as appropriate)    Goal: Nonbehavioral (Nonviolent) Restraint:  Preservation of Dignity and Wellbeing  Outcome: Ongoing (interventions implemented as appropriate)      Problem: Skin Injury Risk (Adult)  Goal: Skin Health and Integrity  Outcome: Ongoing (interventions implemented as appropriate)

## 2018-11-18 NOTE — PROGRESS NOTES
The patient has been processed out of her Posey restraint and is resting comfortably in bed today.  She has been no further management issue.  The results of her urinalysis do not seem to indicate presence of urinary tract infection.

## 2018-11-19 NOTE — PLAN OF CARE
Problem: Patient Care Overview  Goal: Plan of Care Review  Outcome: Ongoing (interventions implemented as appropriate)   11/19/18 1530 11/19/18 1631   Plan of Care Review   Progress --  no change   OTHER   Outcome Summary --  Pt is oriented to self only. She has slept majority of shift, waking to use bathroom and visit with family. She remains on IV fluids and antibiotics and is tolerating well. Will continue to monitor pt for safety and any change in conditon.   Coping/Psychosocial   Plan of Care Reviewed With patient --    Coping/Psychosocial   Patient Agreement with Plan of Care agrees --        Problem: Overarching Goals (Adult)  Goal: Adheres to Safety Considerations for Self and Others  Outcome: Ongoing (interventions implemented as appropriate)   11/19/18 0259   Overarching Goals (Adult)   Adheres to Safety Considerations for Self and Others making progress toward outcome     Intervention: Develop and Maintain Individualized Safety Plan   11/13/18 2217 11/19/18 1530 11/19/18 1600   Violence Risk   Feels Like Hurting Others --  no --    Previous Attempt to Harm Others --  no --    Duty to Warn Initiated no --  --    C-SSRS (Recent)   Wish to be Dead --  no --    Suicidal Thoughts --  no --    Suicide Behavior --  no --    Develop and Maintain Individualized Safety Plan   Safety Measures --  --  safety rounds completed       Goal: Optimized Coping Skills in Response to Life Stressors  Outcome: Ongoing (interventions implemented as appropriate)   11/19/18 0259   Overarching Goals (Adult)   Optimized Coping Skills in Response to Life Stressors making progress toward outcome     Intervention: Promote Effective Coping Strategies   11/19/18 1530   Coping/Psychosocial Interventions   Supportive Measures active listening utilized;verbalization of feelings encouraged;self-care encouraged;positive reinforcement provided       Goal: Develops/Participates in Therapeutic [place] to Support Successful Transition  Outcome:  Ongoing (interventions implemented as appropriate)   11/19/18 0259   Overarching Goals (Adult)   Develops/Participates in Therapeutic [place] to Support Successful Transition making progress toward outcome     Intervention: Foster Therapeutic [place]   11/19/18 1530   Interventions   Trust Relationship/Rapport care explained;thoughts/feelings acknowledged     Intervention: Mutually Develop Transition Plan   11/19/18 1530   Mutually Develop Transition Plan   Transition Support crisis management plan promoted         Problem: Cognitive Impairment (Psychotic Signs/Symptoms) (Adult)  Goal: Improved Thought Clarity/Organization (Psychotic Signs/Symptoms)  Outcome: Ongoing (interventions implemented as appropriate)   11/16/18 1540 11/19/18 1631   Improved Thought Clarity/Organization (Psychotic Signs/Symptoms)   Improved Thought Clarity/Organization Action Step/Short Term Goal (STG) Established 11/16/18 --    Improved Thought Clarity/Organization Time Frame for Action Step (STG) --  1 day   Improved Thought Clarity/Organization Action Step (STG) Outcome --  making progress toward outcome      11/16/18 1540 11/19/18 1631   Improved Thought Clarity/Organization (Psychotic Signs/Symptoms)   Improved Thought Clarity/Organization Action Step/Short Term Goal (STG) Established 11/16/18 --    Improved Thought Clarity/Organization Time Frame for Action Step (STG) --  1 day   Improved Thought Clarity/Organization Action Step (STG) Outcome --  making progress toward outcome       Problem: Mental State/Mood Impairment (Psychotic Signs/Symptoms) (Adult)  Goal: Improved Mental State/Mood (Psychotic Signs/Symptoms)  Outcome: Ongoing (interventions implemented as appropriate)   11/16/18 1540 11/19/18 1631   Improved Mental State/Mood (Psychotic Signs/Symptoms)   Improved Mental State/Mood Action Step/Short Term Goal (STG) Established 11/16/18 --    Improved Mental State/Mood Time Frame for Action Step (STG) --  1 day   Improved Mental  State/Mood Action Step (STG) Outcome --  making progress toward outcome       Problem: Fall Risk (Adult)  Goal: Absence of Fall  Outcome: Ongoing (interventions implemented as appropriate)   11/19/18 1631   Fall Risk (Adult)   Absence of Fall making progress toward outcome       Problem: Skin Injury Risk (Adult)  Goal: Skin Health and Integrity  Outcome: Ongoing (interventions implemented as appropriate)   11/19/18 0259   Skin Injury Risk (Adult)   Skin Health and Integrity making progress toward outcome

## 2018-11-19 NOTE — PROGRESS NOTES
The patient remains pleasantly confused and oriented to person only.  She has not required a Posey vest in over 24 hours.  Her oral intake remains limited.  We await word from family regarding their plans for disposition.

## 2018-11-19 NOTE — PROGRESS NOTES
DAILY PROGRESS NOTE  [facility]    Patient Identification:  Name: Marie Delgado  Age: 89 y.o.  Sex: female  :  1929  MRN: [id number]         Primary Care Physician: Queenie Alas PA-C    Subjective:  Interval History:The patient is confused and poor oral intake.  More alert today.    Objective:    Scheduled Meds:    apixaban 2.5 mg Oral Q12H   atorvastatin 20 mg Oral Daily   ceftriaxone 1 g Intravenous Q24H   vitamin D3 5,000 Units Oral Daily   Divalproex Sodium 250 mg Oral BID   donepezil 5 mg Oral Nightly   OLANZapine 2.5 mg Oral Daily   OLANZapine 5 mg Oral Nightly   propranolol 20 mg Oral BID     Continuous Infusions:    dextrose 5%-0.45%NS with KCl 10 mEq/L 100 mL/hr Last Rate: 100 mL/hr (18)       Vital signs in last 24 hours:  Heart Rate:  [74-92] 92  Resp:  [16-18] 18  BP: ()/(64-68) 98/68    Intake/Output:    Intake/Output Summary (Last 24 hours) at 2018 1506  Last data filed at 2018 0900  Gross per 24 hour   Intake --   Output 7 ml   Net -7 ml       Exam:  BP 98/68 (BP Location: Left arm, Patient Position: Lying)   Pulse 92   Temp 97.3 °F (36.3 °C) (Oral)   Resp 18   Wt 62 kg (136 lb 11 oz)   LMP  (LMP Unknown)   SpO2 96%   BMI 20.18 kg/m²     General Appearance:    Alert, cooperative, no distress   Head:    Normocephalic, without obvious abnormality, atraumatic   Eyes:       Throat:   Lips, tongue, gums normal   Neck:   Supple, symmetrical, trachea midline, no JVD   Lungs:     Clear to auscultation bilaterally, respirations unlabored   Chest Wall:    No tenderness or deformity    Heart:    Regular rate and rhythm, S1 and S2 normal, no murmur,no  Rub or gallop   Abdomen:     Soft, non-tender, bowel sounds active, no masses, no organomegaly    Extremities:   Extremities normal, atraumatic, no cyanosis or edema   Pulses:      Skin:   Skin is warm and dry,  no rashes or palpable lesions   Neurologic:   no focal deficits noted, confused and demented.       Lab Results (last 72 hours)     Procedure Component Value Units Date/Time    Urinalysis With Microscopic If Indicated (No Culture) - Urine, Clean Catch [[id number]]  (Abnormal) Collected:  11/15/18 0556    Specimen:  Urine, Clean Catch Updated:  11/15/18 0726     Color, UA Dark Yellow     Appearance, UA Turbid     pH, UA <=5.0     Specific Gravity, UA 1.016     Glucose, UA Negative     Ketones, UA Trace     Bilirubin, UA Negative     Blood, UA Large (3+)     Protein,  mg/dL (2+)     Leuk Esterase, UA Large (3+)     Nitrite, UA Negative     Urobilinogen, UA 1.0 E.U./dL    Urinalysis, Microscopic Only - Urine, Clean Catch [[id number]]  (Abnormal) Collected:  11/15/18 0556    Specimen:  Urine, Clean Catch Updated:  11/15/18 0726     RBC, UA Too Numerous to Count /HPF      WBC, UA 21-30 /HPF      Bacteria, UA 2+ /HPF      Squamous Epithelial Cells, UA 7-12 /HPF      Transitional Epithelial Cells, UA 0-2 /HPF      Hyaline Casts, UA None Seen /LPF      Methodology Manual Light Microscopy        Data Review:  Results from last 7 days   Lab Units  11/19/18   0403  11/18/18 0440 11/17/18   0747   SODIUM mmol/L  143  147*  145   POTASSIUM mmol/L  3.4*  3.4*  3.5   CHLORIDE mmol/L  108*  109*  103   CO2 mmol/L  23.0  23.9  24.4   BUN mg/dL  25*  33*  39*   CREATININE mg/dL  0.92  1.23*  1.41*   GLUCOSE mg/dL  104*  100*  106*   CALCIUM mg/dL  8.4*  8.9  9.0     Results from last 7 days   Lab Units  11/19/18   0403  11/18/18   0440  11/17/18   0747   WBC 10*3/mm3  5.94  6.33  7.62   HEMOGLOBIN g/dL  10.1*  11.2*  12.5   HEMATOCRIT %  31.8*  36.2  38.7   PLATELETS 10*3/mm3  149  145  161             Lab Results   Lab Value Date/Time    TROPONINT <0.010 08/18/2018 0011    TROPONINT <0.010 04/04/2018 1208    TROPONINT <0.010 04/04/2018 0848    TROPONINT <0.010 04/04/2018 0217    TROPONINT <0.010 12/05/2016 0023               Invalid input(s): PROT, LABALBU          No results found for: POCGLU        Past Medical  History:   Diagnosis Date   • Arthritis    • Basal cell adenocarcinoma     on her nose   • CHF (congestive heart failure) (CMS/[facility])    • Hyperlipidemia    • Hypertension    • Onychomycosis    • Vitamin D deficiency disease        Assessment:  Active Hospital Problems    Diagnosis Date Noted   • **Psychosis (CMS/[facility]) [F29] 11/11/2018   • UTI (urinary tract infection) [N39.0] 11/16/2018   • CHF (congestive heart failure) (CMS/[facility]) - histroy [I50.9] 11/11/2018   • Chronic atrial fibrillation (CMS/[facility]) [I48.2] 11/11/2018   • Acquired hypothyroidism [E03.9] 04/19/2018   • CAD (coronary artery disease) [I25.10] 04/09/2018   • Vitamin D deficiency disease [E55.9] 07/25/2017   • Venous insufficiency [I87.2] 08/17/2016   • Hypertension [I10] 02/10/2016   • Hyperlipidemia [E78.5] 02/10/2016      Resolved Hospital Problems   No resolved problems to display.       Plan:  Continue IV fluids and finish  antibiotics..  Get some follow up lab.  Discussed with nurse.  Will follow. Continue D5.45ns.  ? [place]?? If she doesn't start eating and drinking outlook is poor.    El Canales MD  11/19/2018  3:06 PM

## 2018-11-19 NOTE — PLAN OF CARE
Problem: Patient Care Overview  Goal: Plan of Care Review  Outcome: Ongoing (interventions implemented as appropriate)   11/19/18 0108 11/19/18 0259   Plan of Care Review   Progress --  improving   OTHER   Outcome Summary --  Pt is pleasantly confused and oriented to self only. Pt compliant with medications. Denies anxiety, depression, SI, HI and hallucinations. Pt remains on IV fluids and tolerating well. Will continue to monitor and provide a safe environmeent.   Coping/Psychosocial   Plan of Care Reviewed With patient --    Coping/Psychosocial   Patient Agreement with Plan of Care --  agrees       Problem: Overarching Goals (Adult)  Goal: Adheres to Safety Considerations for Self and Others  Outcome: Ongoing (interventions implemented as appropriate)    Goal: Optimized Coping Skills in Response to Life Stressors  Outcome: Ongoing (interventions implemented as appropriate)    Goal: Develops/Participates in Therapeutic [place] to Support Successful Transition  Outcome: Ongoing (interventions implemented as appropriate)      Problem: Cognitive Impairment (Psychotic Signs/Symptoms) (Adult)  Goal: Improved Thought Clarity/Organization (Psychotic Signs/Symptoms)  Outcome: Ongoing (interventions implemented as appropriate)      Problem: Mental State/Mood Impairment (Psychotic Signs/Symptoms) (Adult)  Goal: Improved Mental State/Mood (Psychotic Signs/Symptoms)  Outcome: Ongoing (interventions implemented as appropriate)      Problem: Skin Injury Risk (Adult)  Goal: Skin Health and Integrity  Outcome: Ongoing (interventions implemented as appropriate)

## 2018-11-19 NOTE — PROGRESS NOTES
Continued Stay Note  [facility]     Patient Name: Marie Delgado  MRN: [id number]  Today's Date: 11/19/2018    Admit Date: 11/11/2018    Discharge Plan     Row Name 11/19/18 1529       Plan    Plan Comments  SW rec'd a call from pt's sister, Ana, ph. [phone] wanting to inquire as to how pt was doing.  She also wanted the doctor to contact her.  Pt's sister stated that pt would be returning to her home.        Discharge Codes    No documentation.             ANTONIO Archibald

## 2018-11-20 NOTE — PROGRESS NOTES
DAILY PROGRESS NOTE  [facility]    Patient Identification:  Name: Marie Delgado  Age: 89 y.o.  Sex: female  :  1929  MRN: [id number]         Primary Care Physician: Queenie Alas PA-C    Subjective:  Interval History:The patient is confused and poor oral intake.  More alert today.    Objective:    Scheduled Meds:    apixaban 2.5 mg Oral Q12H   atorvastatin 20 mg Oral Daily   vitamin D3 5,000 Units Oral Daily   Divalproex Sodium 250 mg Oral BID   donepezil 5 mg Oral Nightly   OLANZapine 2.5 mg Oral Daily   OLANZapine 5 mg Oral Nightly   propranolol 20 mg Oral BID     Continuous Infusions:    dextrose 5%-0.45%NS with KCl 10 mEq/L 100 mL/hr Last Rate: 100 mL/hr (18)       Vital signs in last 24 hours:  Temp:  [97.2 °F (36.2 °C)-98 °F (36.7 °C)] 97.2 °F (36.2 °C)  Heart Rate:  [] 93  Resp:  [16] 16  BP: ()/(60-76) 110/75    Intake/Output:    Intake/Output Summary (Last 24 hours) at 2018 1205  Last data filed at 2018 1801  Gross per 24 hour   Intake 300 ml   Output --   Net 300 ml       Exam:  /75 (BP Location: Right arm, Patient Position: Sitting)   Pulse 93   Temp 97.2 °F (36.2 °C) (Oral)   Resp 16   Wt 62 kg (136 lb 11 oz)   LMP  (LMP Unknown)   SpO2 92%   BMI 20.18 kg/m²     General Appearance:    Alert, cooperative, no distress   Head:    Normocephalic, without obvious abnormality, atraumatic   Eyes:       Throat:   Lips, tongue, gums normal   Neck:   Supple, symmetrical, trachea midline, no JVD   Lungs:     Clear to auscultation bilaterally, respirations unlabored   Chest Wall:    No tenderness or deformity    Heart:    Regular rate and rhythm, S1 and S2 normal, no murmur,no  Rub or gallop   Abdomen:     Soft, non-tender, bowel sounds active, no masses, no organomegaly    Extremities:   Extremities normal, atraumatic, no cyanosis or edema   Pulses:      Skin:   Skin is warm and dry,  no rashes or palpable lesions   Neurologic:   no focal  deficits noted, confused and demented.      Lab Results (last 72 hours)     Procedure Component Value Units Date/Time    Urinalysis With Microscopic If Indicated (No Culture) - Urine, Clean Catch [[id number]]  (Abnormal) Collected:  11/15/18 0556    Specimen:  Urine, Clean Catch Updated:  11/15/18 0726     Color, UA Dark Yellow     Appearance, UA Turbid     pH, UA <=5.0     Specific Gravity, UA 1.016     Glucose, UA Negative     Ketones, UA Trace     Bilirubin, UA Negative     Blood, UA Large (3+)     Protein,  mg/dL (2+)     Leuk Esterase, UA Large (3+)     Nitrite, UA Negative     Urobilinogen, UA 1.0 E.U./dL    Urinalysis, Microscopic Only - Urine, Clean Catch [[id number]]  (Abnormal) Collected:  11/15/18 0556    Specimen:  Urine, Clean Catch Updated:  11/15/18 0726     RBC, UA Too Numerous to Count /HPF      WBC, UA 21-30 /HPF      Bacteria, UA 2+ /HPF      Squamous Epithelial Cells, UA 7-12 /HPF      Transitional Epithelial Cells, UA 0-2 /HPF      Hyaline Casts, UA None Seen /LPF      Methodology Manual Light Microscopy        Data Review:  Results from last 7 days   Lab Units  11/19/18   0403  11/18/18 0440  11/17/18   0747   SODIUM mmol/L  143  147*  145   POTASSIUM mmol/L  3.4*  3.4*  3.5   CHLORIDE mmol/L  108*  109*  103   CO2 mmol/L  23.0  23.9  24.4   BUN mg/dL  25*  33*  39*   CREATININE mg/dL  0.92  1.23*  1.41*   GLUCOSE mg/dL  104*  100*  106*   CALCIUM mg/dL  8.4*  8.9  9.0     Results from last 7 days   Lab Units  11/19/18   0403  11/18/18   0440  11/17/18   0747   WBC 10*3/mm3  5.94  6.33  7.62   HEMOGLOBIN g/dL  10.1*  11.2*  12.5   HEMATOCRIT %  31.8*  36.2  38.7   PLATELETS 10*3/mm3  149  145  161             Lab Results   Lab Value Date/Time    TROPONINT <0.010 08/18/2018 0011    TROPONINT <0.010 04/04/2018 1208    TROPONINT <0.010 04/04/2018 0848    TROPONINT <0.010 04/04/2018 0217    TROPONINT <0.010 12/05/2016 0023               Invalid input(s): PROT, LABALBU          No results  found for: CARMINE        Past Medical History:   Diagnosis Date   • Arthritis    • Basal cell adenocarcinoma     on her nose   • CHF (congestive heart failure) (CMS/[facility])    • Hyperlipidemia    • Hypertension    • Onychomycosis    • Vitamin D deficiency disease        Assessment:  Active Hospital Problems    Diagnosis Date Noted   • **Psychosis (CMS/[facility]) [F29] 11/11/2018   • UTI (urinary tract infection) [N39.0] 11/16/2018   • CHF (congestive heart failure) (CMS/[facility]) - histroy [I50.9] 11/11/2018   • Chronic atrial fibrillation (CMS/[facility]) [I48.2] 11/11/2018   • Acquired hypothyroidism [E03.9] 04/19/2018   • CAD (coronary artery disease) [I25.10] 04/09/2018   • Vitamin D deficiency disease [E55.9] 07/25/2017   • Venous insufficiency [I87.2] 08/17/2016   • Hypertension [I10] 02/10/2016   • Hyperlipidemia [E78.5] 02/10/2016      Resolved Hospital Problems   No resolved problems to display.       Plan:  Continue IV fluids and finish  antibiotics..  Get some follow up lab.  Discussed with nurse.  Will follow. Continue D5.45ns.  ? [place]?? If she doesn't start eating and drinking outlook is poor.  She says her family will make her eat if she can go home.    [place] MD  11/20/2018  12:05 PM

## 2018-11-20 NOTE — PLAN OF CARE
Problem: Patient Care Overview  Goal: Plan of Care Review  Outcome: Ongoing (interventions implemented as appropriate)   11/20/18 0315 11/20/18 0329   Plan of Care Review   Progress --  no change   OTHER   Outcome Summary --  Pt slept throughout shift. Will continue to monitor and assess.    Coping/Psychosocial   Plan of Care Reviewed With patient --    Coping/Psychosocial   Patient Agreement with Plan of Care unable to participate --         Problem: Overarching Goals (Adult)  Goal: Adheres to Safety Considerations for Self and Others  Outcome: Ongoing (interventions implemented as appropriate)    Goal: Optimized Coping Skills in Response to Life Stressors  Outcome: Ongoing (interventions implemented as appropriate)    Goal: Develops/Participates in Therapeutic [place] to Support Successful Transition  Outcome: Outcome(s) achieved Date Met: 11/20/18      Problem: Mental State/Mood Impairment (Psychotic Signs/Symptoms) (Adult)  Goal: Improved Mental State/Mood (Psychotic Signs/Symptoms)  Outcome: Ongoing (interventions implemented as appropriate)      Problem: Fall Risk (Adult)  Goal: Absence of Fall  Outcome: Ongoing (interventions implemented as appropriate)      Problem: Skin Injury Risk (Adult)  Goal: Skin Health and Integrity  Outcome: Ongoing (interventions implemented as appropriate)

## 2018-11-20 NOTE — PROGRESS NOTES
Continued Stay Note  [facility]     Patient Name: Marie Delgado  MRN: [id number]  Today's Date: 11/20/2018    Admit Date: 11/11/2018    Discharge Plan     Row Name 11/20/18 1516       Plan    Plan Comments  SW called and spoke w/pt's sister, Aan, ph. [phone] to discuss pt's discharge on tomorrow.  DEREK advised that home health through [facility] will be set up prior to pt's discharge.  DEREK advised that she would contact pt's family in the morning to set up time for d/c.        Discharge Codes    No documentation.             ANTONIO Archibald

## 2018-11-20 NOTE — THERAPY TREATMENT NOTE
Acute Care - Occupational Therapy Treatment Note  [facility]     Patient Name: Marie Delgado  : 1929  MRN: [id number]  Today's Date: 2018  Onset of Illness/Injury or Date of Surgery: 18     Referring Physician: Ashwini    Admit Date: 2018     No diagnosis found.  Patient Active Problem List   Diagnosis   • Acid reflux   • Hypertension   • Arthritis   • Hyperlipidemia   • Venous insufficiency   • Onychomycosis   • Pedal edema   • Vitamin D deficiency disease   • New onset atrial fibrillation (CMS/HCC)   • Acute congestive heart failure (CMS/HCC)   • Acute respiratory failure with hypoxia (CMS/HCC)   • Pleural effusion, bilateral   • Hypokalemia   • Abnormal stress test   • CAD (coronary artery disease)   • Acquired hypothyroidism   • Asymptomatic microscopic hematuria   • Psychosis (CMS/HCC)   • CHF (congestive heart failure) (CMS/HCC) - histroy   • Chronic atrial fibrillation (CMS/HCC)   • UTI (urinary tract infection)     Past Medical History:   Diagnosis Date   • Arthritis    • Basal cell adenocarcinoma     on her nose   • CHF (congestive heart failure) (CMS/HCC)    • Hyperlipidemia    • Hypertension    • Onychomycosis    • Vitamin D deficiency disease      Past Surgical History:   Procedure Laterality Date   • APPENDECTOMY     • CATARACT EXTRACTION     • CORNEAL TRANSPLANT     • HEMORROIDECTOMY     • TONSILLECTOMY         Therapy Treatment    Rehabilitation Treatment Summary     Row Name 18 1100             Treatment Time/Intention    Discipline  occupational therapist  -MR      Document Type  therapy note (daily note)  -MR      Mode of Treatment  occupational therapy  -MR      Patient/Family Observations  pt seated EOB, nursing staff present  -MR2      Patient Effort  fair  -MR      Existing Precautions/Restrictions  fall  -MR2      Recorded by [MR] Ana Aguirre, OT 18 1156  [MR2] Ana Aguirre, OT 18 1205      Row Name 18 1100              Cognitive Assessment/Intervention- PT/OT    Orientation Status (Cognition)  oriented to;person;place  -MR      Follows Commands (Cognition)  repetition of directions required;verbal cues/prompting required  -MR      Recorded by [MR] Ana Aguirre, OT 11/20/18 1156      Row Name 11/20/18 1100             Bed Mobility Assessment/Treatment    Bed Mobility Assessment/Treatment  sit-supine  -MR      Sit-Supine [place] (Bed Mobility)  minimum assist (75% patient effort);moderate assist (50% patient effort);verbal cues  -MR      Recorded by [MR] Ana Aguirre, OT 11/20/18 1205      Row Name 11/20/18 1100             ADL Assessment/Intervention    BADL Assessment/Intervention  grooming  -MR      Recorded by [MR] Ana Aguirre, OT 11/20/18 1205      Row Name 11/20/18 1100             Grooming Assessment/Training    Comment (Grooming)  pt initially agreeable to perform grooming activities, however after OT provides setup, pt declines participation with grooming   -MR      Recorded by [MR] Ana Aguirre, OT 11/20/18 1205      Row Name 11/20/18 1100             Motor Skills Assessment/Interventions    Additional Documentation  Therapeutic Exercise (Group);Therapeutic Exercise Interventions (Group)  -MR      Recorded by [MR] Ana Aguirre, OT 11/20/18 1205      Row Name 11/20/18 1100             Therapeutic Exercise    Upper Extremity Range of Motion (Therapeutic Exercise)  shoulder flexion/extension, left;shoulder flexion/extension, right;shoulder horizontal abduction/adduction, left;shoulder horizontal abduction/adduction, right;elbow flexion/extension, left;elbow flexion/extension, right;forearm supination/pronation, left;forearm supination/pronation, right  -MR      Hand (Therapeutic Exercise)  finger flexion/extension, left;finger flexion/extension, right  -MR      Exercise Type (Therapeutic Exercise)  AROM (active range of motion)  -MR      Expected Outcome (Therapeutic Exercise)  improve  functional tolerance, self-care activity;improve performance, BADLs  -MR      Recorded by [MR] Ana Aguirre, OT 11/20/18 1205      Row Name 11/20/18 1100             Positioning and Restraints    Pre-Treatment Position  in bed  -MR      Post Treatment Position  bed  -MR      In Bed  supine;notified nsg;call light within reach;encouraged to call for assist;exit alarm on  -MR      Recorded by [MR] Ana Aguirre, OT 11/20/18 1205      Row Name 11/20/18 1100             Pain Scale: Word Pre/Post-Treatment    Pain: Word Scale, Pretreatment  0 - no pain  -MR      Pain: Word Scale, Post-Treatment  0 - no pain  -MR      Recorded by [MR] nAa Aguirre, OT 11/20/18 1205        User Key  (r) = Recorded By, (t) = Taken By, (c) = Cosigned By    Initials Name Effective Dates Discipline    Ana Martinez, OT 06/22/16 -  OT             Occupational Therapy Education     Title: PT OT SLP Therapies (Active)     Topic: Occupational Therapy (Active)     Point: ADL training (Active)     Description: Instruct learner(s) on proper safety adaptation and remediation techniques during self care or transfers.   Instruct in proper use of assistive devices.    Learning Progress Summary           Patient Acceptance, E,TB, NR by MR at 11/13/2018  2:28 PM    Comment:  Pt educated on role of OT.                               User Key     Initials Effective Dates Name Provider Type Discipline    MR 06/22/16 -  Ana Aguirre, OT Occupational Therapist OT                OT Recommendation and Plan  Outcome Summary/Treatment Plan (OT)  Anticipated Discharge Disposition (OT): skilled nursing facility, home with assist, home with home health(pending pt's progress)  Therapy Frequency (OT Eval): 5 times/wk  Plan of Care Review  Plan of Care Reviewed With: patient  Plan of Care Reviewed With: patient  Outcome Summary: Pt seen for initial evaluation, recommend skilled OT services to increase safety and independence with performance  of ADLs.  Outcome Measures     Row Name 11/20/18 1200             How much help from another is currently needed...    Putting on and taking off regular lower body clothing?  2  -MR      Bathing (including washing, rinsing, and drying)  2  -MR      Toileting (which includes using toilet bed pan or urinal)  2  -MR      Putting on and taking off regular upper body clothing  3  -MR      Taking care of personal grooming (such as brushing teeth)  3  -MR      Eating meals  3  -MR      Score  15  -MR        User Key  (r) = Recorded By, (t) = Taken By, (c) = Cosigned By    Initials Name Provider Type    Ana Martinez MATILDA Chicas Occupational Therapist           Time Calculation:   Time Calculation- OT     Row Name 11/20/18 1205             Time Calculation- OT    OT Start Time  0843  -MR      OT Stop Time  0857  -MR      OT Time Calculation (min)  14 min  -MR      Total Timed Code Minutes- OT  14 minute(s)  -MR      OT Received On  11/20/18  -MR        User Key  (r) = Recorded By, (t) = Taken By, (c) = Cosigned By    Initials Name Provider Type    MR Aguirre Ana Chicas OT Occupational Therapist           Therapy Suggested Charges     Code   Minutes Charges    None           Therapy Charges for Today     Code Description Service Date Service Provider Modifiers Qty    [id number]  OT THER PROC EA 15 MIN 11/20/2018 Ana Aguirre OT GO 1               Ana Aguirre OT  11/20/2018

## 2018-11-20 NOTE — PROGRESS NOTES
The patient seems a bit brighter today and is beginning to push for discharge.  I spoke with the patient's sister regarding her diagnosis prognosis etc.  Should the patient sustain progress, discharge could take place as early as tomorrow.  I Depakote level be drawn tomorrow prior to discharge.

## 2018-11-20 NOTE — PLAN OF CARE
Problem: Patient Care Overview  Goal: Plan of Care Review  Outcome: Ongoing (interventions implemented as appropriate)   11/20/18 0903 11/20/18 1506   Plan of Care Review   Progress --  improving   OTHER   Outcome Summary --  The patient is confused and oreinted to self and place. The patient denied any anxiety, depression, SI, HI, pain, or hallucinations. The patient voiced she is ready to go home. She has been withdrawn to her room. Has tolerated IV fluids well. Cooperative with medications. continuing to monitor.    Coping/Psychosocial   Plan of Care Reviewed With --  patient   Coping/Psychosocial   Patient Agreement with Plan of Care agrees with comment (describe)  (wants to leave) --      Goal: Individualization and Mutuality  Outcome: Ongoing (interventions implemented as appropriate)    Goal: Discharge Needs Assessment  Outcome: Ongoing (interventions implemented as appropriate)    Goal: Interprofessional Rounds/Family Conf  Outcome: Ongoing (interventions implemented as appropriate)      Problem: Overarching Goals (Adult)  Goal: Adheres to Safety Considerations for Self and Others  Outcome: Ongoing (interventions implemented as appropriate)   11/20/18 1506   Overarching Goals (Adult)   Adheres to Safety Considerations for Self and Others making progress toward outcome     Intervention: Develop and Maintain Individualized Safety Plan   11/20/18 0903   C-SSRS (Recent)   Wish to be Dead no   Suicidal Thoughts no   Suicide Behavior no   Violence Risk   Feels Like Hurting Others no   Develop and Maintain Individualized Safety Plan   Safety Measures safety rounds completed;suicide assessment completed       Goal: Optimized Coping Skills in Response to Life Stressors  Outcome: Ongoing (interventions implemented as appropriate)   11/20/18 1506   Overarching Goals (Adult)   Optimized Coping Skills in Response to Life Stressors making progress toward outcome     Intervention: Promote Effective Coping Strategies    11/20/18 0903   Coping/Psychosocial Interventions   Supportive Measures active listening utilized;relaxation techniques promoted;self-care encouraged;self-reflection promoted;self-responsibility promoted;verbalization of feelings encouraged         Problem: Cognitive Impairment (Psychotic Signs/Symptoms) (Adult)  Goal: Improved Thought Clarity/Organization (Psychotic Signs/Symptoms)  Outcome: Ongoing (interventions implemented as appropriate)   11/20/18 1506   Improved Thought Clarity/Organization (Psychotic Signs/Symptoms)   Improved Thought Clarity/Organization Time Frame for Action Step (STG) 1 day   Improved Thought Clarity/Organization Action Step (STG) Outcome making progress toward outcome       Problem: Mental State/Mood Impairment (Psychotic Signs/Symptoms) (Adult)  Goal: Improved Mental State/Mood (Psychotic Signs/Symptoms)  Outcome: Ongoing (interventions implemented as appropriate)   11/20/18 1506   Improved Mental State/Mood (Psychotic Signs/Symptoms)   Improved Mental State/Mood Time Frame for Action Step (STG) 1 day   Improved Mental State/Mood Action Step (STG) Outcome making progress toward outcome       Problem: Fall Risk (Adult)  Goal: Absence of Fall  Outcome: Ongoing (interventions implemented as appropriate)   11/20/18 1506   Fall Risk (Adult)   Absence of Fall making progress toward outcome       Problem: Skin Injury Risk (Adult)  Goal: Skin Health and Integrity  Outcome: Ongoing (interventions implemented as appropriate)   11/20/18 1506   Skin Injury Risk (Adult)   Skin Health and Integrity making progress toward outcome

## 2018-11-21 NOTE — PROGRESS NOTES
Continued Stay Note  [facility]     Patient Name: Marie Delgado  MRN: [id number]  Today's Date: 11/21/2018    Admit Date: 11/11/2018    Discharge Plan     Row Name 11/21/18 1127       Plan    Final Discharge Disposition Code  01 - home or self-care    Final Note  Pt will be discharged per Dr. Maradiaga's orders.  DEREK spoke w/pt & pt's nephew, Partha to discuss d/c planning.  DEREK informed the family that svcs through [facility] will be set up prior to pt's discharge.  Pt met w/Alissa liaison w/[facility] to give documentation for home health svcs.  Pt will follow-up w/her PCP.  Pt will be transported home by her nephew, Partha.        Discharge Codes    No documentation.       Expected Discharge Date and Time     Expected Discharge Date Expected Discharge Time    Nov 21, 2018             ANTONIO Archibald

## 2018-11-21 NOTE — PLAN OF CARE
Problem: Patient Care Overview  Goal: Plan of Care Review  Outcome: Ongoing (interventions implemented as appropriate)   11/21/18 0515 11/21/18 0531   Plan of Care Review   Progress --  no change   OTHER   Outcome Summary --  A&O to self only. Cooperative and med compliant. Will continue to monitor and assess.    Coping/Psychosocial   Plan of Care Reviewed With patient --    Coping/Psychosocial   Patient Agreement with Plan of Care unable to participate --        Problem: Overarching Goals (Adult)  Goal: Adheres to Safety Considerations for Self and Others  Outcome: Ongoing (interventions implemented as appropriate)    Goal: Optimized Coping Skills in Response to Life Stressors  Outcome: Ongoing (interventions implemented as appropriate)      Problem: Mental State/Mood Impairment (Psychotic Signs/Symptoms) (Adult)  Goal: Improved Mental State/Mood (Psychotic Signs/Symptoms)  Outcome: Ongoing (interventions implemented as appropriate)      Problem: Skin Injury Risk (Adult)  Goal: Skin Health and Integrity  Outcome: Ongoing (interventions implemented as appropriate)

## 2018-11-21 NOTE — SIGNIFICANT NOTE
11/21/18 1113   Rehab Treatment   Discipline occupational therapist   Reason Treatment Not Performed other (see comments)  (Per nsg should d/c today.)

## 2018-11-21 NOTE — DISCHARGE SUMMARY
DATES OF ADMISSION: 11/11/2018-11/21/2018    REASON FOR ADMISSION: The patient is an 89-year-old white female admitted with increasing delusional thinking and behavioral disturbance related to a history of Alzheimer's dementia.    LABS:  See chart    HOSPITAL COURSE:  The patient is been crisis management unit and placed on [facility] 2 programming.  She was begun on Depakote sprinkles and was also started on low-dose Zyprexa to address delusional thinking.  Neuropsychological evaluation indicated the presence of primary dementia Alzheimer's type with behavioral disturbance.  Accordingly, the patient was begun on Aricept 5 mg at nighttime in hopes of slowing the progression of this illness.  Otherwise the patient's stay in the hospital was initially characterized by Ang's behavior which necessitated titrating her Zyprexa and olanzapine to the final doses.  Finally with completion of titration, the patient was able to do well out of her [place].  She maintained stability for several days and discharge was ordered with home health follow-up on 11/21/2018    FINAL DIAGNOSIS:  Primary dementia Alzheimer's type with behavioral disturbance, atrial fibrillation, hypertension    DISPOSITION ON DISCHARGE:  A full listing of the patient's medications is provided below.  Follow-up will take place to the auspices of community mental health resources and home health.    PROGNOSIS: Fair       Discharge Medications      New Medications      Instructions Start Date   Divalproex Sodium 125 MG capsule  Commonly known as:  DEPAKOTE SPRINKLE   250 mg, Oral, 2 Times Daily      donepezil 5 MG tablet  Commonly known as:  ARICEPT   5 mg, Oral, Nightly      OLANZapine 2.5 MG tablet  Commonly known as:  zyPREXA   2.5 mg, Oral, Daily         Continue These Medications      Instructions Start Date   amLODIPine 2.5 MG tablet  Commonly known as:  NORVASC   TAKE ONE TABLET BY MOUTH DAILY      atorvastatin 20 MG tablet  Commonly known as:  LIPITOR    20 mg, Oral, Daily, For cholesterol      ELIQUIS 5 MG tablet tablet  Generic drug:  apixaban   TAKE ONE TABLET BY MOUTH EVERY 12 HOURS      furosemide 20 MG tablet  Commonly known as:  LASIX   TAKE ONE TABLET BY MOUTH DAILY      nitroglycerin 0.4 MG SL tablet  Commonly known as:  NITROSTAT   1 under the tongue as needed for chest pain, may repeat q5mins for up three doses      omeprazole 20 MG capsule  Commonly known as:  priLOSEC   TAKE ONE CAPSULE BY MOUTH DAILY FOR GERD      potassium chloride 20 MEQ/15ML (10%) solution  Commonly known as:  KAYCIEL   10 mEq, Oral, Daily      propranolol 20 MG tablet  Commonly known as:  INDERAL   TAKE ONE TABLET BY MOUTH TWO TIMES A DAY      traMADol 50 MG tablet  Commonly known as:  ULTRAM   50 mg, Oral, Every 8 Hours PRN      vitamin D3 5000 units tablet tablet   5,000 Units, Oral, Daily

## 2018-11-21 NOTE — PROGRESS NOTES
DAILY PROGRESS NOTE  [facility]    Patient Identification:  Name: Marie Delgado  Age: 89 y.o.  Sex: female  :  1929  MRN: [id number]         Primary Care Physician: Queenie Alas PA-C    Subjective:  Interval History:The patient is confused and poor oral intake.  More alert today.    Objective:    Scheduled Meds:    apixaban 2.5 mg Oral Q12H   atorvastatin 20 mg Oral Daily   vitamin D3 5,000 Units Oral Daily   Divalproex Sodium 250 mg Oral BID   donepezil 5 mg Oral Nightly   OLANZapine 2.5 mg Oral Daily   OLANZapine 5 mg Oral Nightly   propranolol 20 mg Oral BID     Continuous Infusions:    dextrose 5%-0.45%NS with KCl 10 mEq/L 100 mL/hr Last Rate: 100 mL/hr (18)       Vital signs in last 24 hours:  Temp:  [96.5 °F (35.8 °C)-96.9 °F (36.1 °C)] 96.5 °F (35.8 °C)  Heart Rate:  [] 100  Resp:  [16-18] 16  BP: (104-122)/(59-74) 122/74    Intake/Output:    Intake/Output Summary (Last 24 hours) at 2018 0959  Last data filed at 2018 1811  Gross per 24 hour   Intake 220 ml   Output --   Net 220 ml       Exam:  /74 (BP Location: Right arm, Patient Position: Lying)   Pulse 100   Temp 96.5 °F (35.8 °C) (Oral)   Resp 16   Wt 62 kg (136 lb 11 oz)   LMP  (LMP Unknown)   SpO2 95%   BMI 20.18 kg/m²     General Appearance:    Alert, cooperative, no distress   Head:    Normocephalic, without obvious abnormality, atraumatic   Eyes:       Throat:   Lips, tongue, gums normal   Neck:   Supple, symmetrical, trachea midline, no JVD   Lungs:     Clear to auscultation bilaterally, respirations unlabored   Chest Wall:    No tenderness or deformity    Heart:    Regular rate and rhythm, S1 and S2 normal, no murmur,no  Rub or gallop   Abdomen:     Soft, non-tender, bowel sounds active, no masses, no organomegaly    Extremities:   Extremities normal, atraumatic, no cyanosis or edema   Pulses:      Skin:   Skin is warm and dry,  no rashes or palpable lesions   Neurologic:   no focal  deficits noted, confused and demented.      Lab Results (last 72 hours)     Procedure Component Value Units Date/Time    Urinalysis With Microscopic If Indicated (No Culture) - Urine, Clean Catch [[id number]]  (Abnormal) Collected:  11/15/18 0556    Specimen:  Urine, Clean Catch Updated:  11/15/18 0726     Color, UA Dark Yellow     Appearance, UA Turbid     pH, UA <=5.0     Specific Gravity, UA 1.016     Glucose, UA Negative     Ketones, UA Trace     Bilirubin, UA Negative     Blood, UA Large (3+)     Protein,  mg/dL (2+)     Leuk Esterase, UA Large (3+)     Nitrite, UA Negative     Urobilinogen, UA 1.0 E.U./dL    Urinalysis, Microscopic Only - Urine, Clean Catch [[id number]]  (Abnormal) Collected:  11/15/18 0556    Specimen:  Urine, Clean Catch Updated:  11/15/18 0726     RBC, UA Too Numerous to Count /HPF      WBC, UA 21-30 /HPF      Bacteria, UA 2+ /HPF      Squamous Epithelial Cells, UA 7-12 /HPF      Transitional Epithelial Cells, UA 0-2 /HPF      Hyaline Casts, UA None Seen /LPF      Methodology Manual Light Microscopy        Data Review:  Results from last 7 days   Lab Units  11/21/18   0617 11/19/18 0403  11/18/18   0440   SODIUM mmol/L  139  143  147*   POTASSIUM mmol/L  3.4*  3.4*  3.4*   CHLORIDE mmol/L  107  108*  109*   CO2 mmol/L  19.8*  23.0  23.9   BUN mg/dL  9  25*  33*   CREATININE mg/dL  0.64  0.92  1.23*   GLUCOSE mg/dL  114*  104*  100*   CALCIUM mg/dL  9.2  8.4*  8.9     Results from last 7 days   Lab Units  11/21/18   0617 11/19/18   0403  11/18/18   0440   WBC 10*3/mm3  6.30  5.94  6.33   HEMOGLOBIN g/dL  12.2  10.1*  11.2*   HEMATOCRIT %  39.3  31.8*  36.2   PLATELETS 10*3/mm3  189  149  145             Lab Results   Lab Value Date/Time    TROPONINT <0.010 08/18/2018 0011    TROPONINT <0.010 04/04/2018 1208    TROPONINT <0.010 04/04/2018 0848    TROPONINT <0.010 04/04/2018 0217    TROPONINT <0.010 12/05/2016 0023               Invalid input(s): PROT, LABALBU          No results found  for: CARMINE        Past Medical History:   Diagnosis Date   • Arthritis    • Basal cell adenocarcinoma     on her nose   • CHF (congestive heart failure) (CMS/[facility])    • Hyperlipidemia    • Hypertension    • Onychomycosis    • Vitamin D deficiency disease        Assessment:  Active Hospital Problems    Diagnosis Date Noted   • **Psychosis (CMS/[facility]) [F29] 11/11/2018   • UTI (urinary tract infection) [N39.0] 11/16/2018   • CHF (congestive heart failure) (CMS/[facility]) - histroy [I50.9] 11/11/2018   • Chronic atrial fibrillation (CMS/[facility]) [I48.2] 11/11/2018   • Acquired hypothyroidism [E03.9] 04/19/2018   • CAD (coronary artery disease) [I25.10] 04/09/2018   • Vitamin D deficiency disease [E55.9] 07/25/2017   • Venous insufficiency [I87.2] 08/17/2016   • Hypertension [I10] 02/10/2016   • Hyperlipidemia [E78.5] 02/10/2016      Resolved Hospital Problems   No resolved problems to display.       Plan:  Continue IV fluids and finish  antibiotics..  OK with DC plans.  Discussed with nurse.  Will follow. Continue D5.45ns.  ? [facility]?? If she doesn't start eating and drinking outlook is poor.  She says her family will make her eat if she can go home.    El Canales MD  11/21/2018  9:59 AM

## 2018-11-30 PROBLEM — R65.21 SEPTIC SHOCK (HCC): Status: ACTIVE | Noted: 2018-01-01

## 2018-11-30 PROBLEM — A41.9 SEPTIC SHOCK (HCC): Status: ACTIVE | Noted: 2018-01-01

## 2018-12-01 NOTE — NURSING NOTE
Iv team consulted to place a picc line. Spoke with Jv bojorquez RN no consent noted at this time Family was reported to be at bsd. Educated family and answered questions pertaining to the risk and benefits of the picc line. Pt currently has 2 working piv and pressors have been stopped with BP doing well at this time. Family concerned pt is declining faster than they thought she would and questioning having the picc placed at this time and prefer to wait until the am to make the decision whether to go forth with picc placement or not. Family is aware pt will need 2 blood draws throughout the night and ok with that. Iv team will follow up in the am with the family about placing the picc.

## 2018-12-01 NOTE — THERAPY EVALUATION
Acute Care - Speech Language Pathology   Swallow Initial Evaluation [facility]     Patient Name: Marie Delgado  : 1929  MRN: [id number]  Today's Date: 2018               Admit Date: 2018    Visit Dx:     ICD-10-CM ICD-9-CM   1. Septic shock (CMS/HCC) A41.9 038.9    R65.21 785.52     995.92   2. Acute cholecystitis K81.0 575.0   3. Ovarian mass N83.9 620.9   4. Hypokalemia E87.6 276.8     Patient Active Problem List   Diagnosis   • Acid reflux   • Hypertension   • Arthritis   • Hyperlipidemia   • Venous insufficiency   • Onychomycosis   • Pedal edema   • Vitamin D deficiency disease   • New onset atrial fibrillation (CMS/HCC)   • Acute congestive heart failure (CMS/HCC)   • Acute respiratory failure with hypoxia (CMS/HCC)   • Pleural effusion, bilateral   • Hypokalemia   • Abnormal stress test   • CAD (coronary artery disease)   • Acquired hypothyroidism   • Asymptomatic microscopic hematuria   • Psychosis (CMS/HCC)   • CHF (congestive heart failure) (CMS/[facility]) - histroy   • Chronic atrial fibrillation (CMS/HCC)   • UTI (urinary tract infection)   • Septic shock (CMS/HCC)     Past Medical History:   Diagnosis Date   • Arthritis    • Basal cell adenocarcinoma     on her nose   • CHF (congestive heart failure) (CMS/HCC)    • Dementia    • Hyperlipidemia    • Hypertension    • Onychomycosis    • Vitamin D deficiency disease      Past Surgical History:   Procedure Laterality Date   • APPENDECTOMY     • CATARACT EXTRACTION     • CORNEAL TRANSPLANT     • HEMORROIDECTOMY     • TONSILLECTOMY          SWALLOW EVALUATION (last 72 hours)      [facility] Adult Swallow Evaluation     Row Name 18 0900                   Rehab Evaluation    Document Type  evaluation  -MG        Subjective Information  no complaints  -MG        Patient Observations  alert;cooperative;agree to therapy  -MG        Patient Effort  good  -MG        Symptoms Noted During/After Treatment  none  -MG           General Information    Patient  Profile Reviewed  yes  -MG        Current Method of Nutrition  NPO  -MG        Precautions/Limitations, Vision  WFL;for purposes of eval  -MG        Precautions/Limitations, Hearing  WFL  -MG        Prior Level of Function-Communication  cognitive-linguistic impairment  -MG        Prior Level of Function-Swallowing  no diet consistency restrictions  -MG        Plans/Goals Discussed with  patient;patient and family  -MG        Barriers to Rehab  cognitive status  -MG        Patient's Goals for Discharge  patient did not state  -MG        Family Goals for Discharge  family did not state  -MG           Pain Assessment    Additional Documentation  Pain Scale: Numbers Pre/Post-Treatment (Group)  -MG           Pain Scale: Numbers Pre/Post-Treatment    Pain Scale: Numbers, Pretreatment  0/10 - no pain  -MG           Oral Motor and Function    Dentition Assessment  natural, present and adequate  -MG        Secretion Management  WNL/WFL  -MG        Mucosal Quality  moist, healthy  -MG        Volitional Swallow  WFL  -MG        Volitional Cough  weak  -MG           Oral Musculature and Cranial Nerve Assessment    Oral Motor General Assessment  WFL  -MG           General Eating/Swallowing Observations    Respiratory Support Currently in Use  room air  -MG        Eating/Swallowing Skills  fed by SLP  -MG        Positioning During Eating  upright in bed  -MG        Utensils Used  spoon;cup;straw  -MG        Consistencies Trialed  soft textures;pureed;thin liquids  -MG           Clinical Swallow Eval    Oral Prep Phase  WFL  -MG        Oral Transit  WFL  -MG        Oral Residue  WFL  -MG        Pharyngeal Phase  WFL  -MG        Esophageal Phase  unremarkable  -MG           Clinical Impression    SLP Swallowing Diagnosis  functional oral phase;functional pharyngeal phase  -MG        Functional Impact  no impact on function  -MG        Rehab Potential/Prognosis, Swallowing  good, to achieve stated therapy goals  -MG        Swallow  Criteria for Skilled Therapeutic Interventions Met  demonstrates skilled criteria  -MG           Recommendations    Therapy Frequency (Swallow)  evaluation only  -MG        SLP Diet Recommendation  soft textures;thin liquids  -MG        Recommended Precautions and Strategies  upright posture during/after eating  -MG        SLP Rec. for Method of Medication Administration  meds whole;with pudding or applesauce  -MG        Monitor for Signs of Aspiration  yes;notify SLP if any concerns;cough;throat clearing;pneumonia  -MG        Anticipated Dischage Disposition  unknown  -MG          User Key  (r) = Recorded By, (t) = Taken By, (c) = Cosigned By    Initials Name Effective Dates    MG Concepcion Sneed MA,CCC-SLP 06/08/18 -           EDUCATION  The patient has been educated in the following areas:   Dysphagia (Swallowing Impairment) Oral Care/Hydration Modified Diet Instruction.    SLP Recommendation and Plan  SLP Swallowing Diagnosis: functional oral phase, functional pharyngeal phase  SLP Diet Recommendation: soft textures, thin liquids  Recommended Precautions and Strategies: upright posture during/after eating     Monitor for Signs of Aspiration: yes, notify SLP if any concerns, cough, throat clearing, pneumonia     Swallow Criteria for Skilled Therapeutic Interventions Met: demonstrates skilled criteria  Anticipated Dischage Disposition: unknown  Rehab Potential/Prognosis, Swallowing: good, to achieve stated therapy goals  Therapy Frequency (Swallow): evaluation only          Plan of Care Reviewed With: patient, family  Plan of Care Review  Plan of Care Reviewed With: patient, family  Progress: no change  Outcome Summary: Bedside swallow eval: Pt oral mech WFL for age, teeth present. Trials of ice, water via straw and cup, puree and mech soft. Pt w/ appropriate but slow oral phase, timely swallow, good laryngeal elevation, dry vocal quality, no s/s aspiration. Rec. soft diet with thin liquids.         SLP Outcome  Measures (last 72 hours)      SLP Outcome Measures     Row Name 12/01/18 0900             SLP Outcome Measures    Outcome Measure Used?  Adult NOMS  -MG         Adult FCM Scores    FCM Chosen  Swallowing  -MG      Swallowing FCM Score  5  -MG        User Key  (r) = Recorded By, (t) = Taken By, (c) = Cosigned By    Initials Name Effective Dates    Concepcion Farris MA, CCC-SLP 06/08/18 -            Time Calculation:   Time Calculation- SLP     Row Name 12/01/18 0933             Time Calculation- SLP    SLP Received On  12/01/18  -MG        User Key  (r) = Recorded By, (t) = Taken By, (c) = Cosigned By    Initials Name Provider Type    Concepcion Farris MA,CCC-SLP Speech and Language Pathologist          Therapy Charges for Today     Code Description Service Date Service Provider Modifiers Qty    [id number]  ST EVAL ORAL PHARYNG SWALLOW 3 12/1/2018 Concepcion Sneed MA,GATO-SLP GN 1               Concepcion Sneed MA, CCC-SLP  12/1/2018

## 2018-12-01 NOTE — PROGRESS NOTES
LOS: 1 day   Patient Care Team:  Queenie Alas PA-C as PCP - General  Queenie Alas PA-C as PCP - Family Medicine  Elias Noyola MD as Consulting Physician (Cardiology)    Subjective     Patient is confused she is pleasant she wants to go home.  Her sister who is her healthcare surrogate and next of kin and caregiver along with her family are at bedside.    Review of Systems:   Unable to obtain patient confused      Objective     Vital Signs  Vital Sign Min/Max for last 24 hours  Temp  Min: 96.1 °F (35.6 °C)  Max: 97.7 °F (36.5 °C)   BP  Min: 78/45  Max: 119/79   Pulse  Min: 64  Max: 103   Resp  Min: 16  Max: 18   SpO2  Min: 87 %  Max: 100 %   No Data Recorded   Weight  Min: 61 kg (134 lb 7.7 oz)  Max: 64.9 kg (143 lb)        Ventilator/Non-Invasive Ventilation Settings (From admission, onward)    None                       Body mass index is 23.08 kg/m².  I/O last 3 completed shifts:  In: 3961.2 [I.V.:1511.2; IV Piggyback:2450]  Out: 1160 [Urine:1160]  I/O this shift:  In: -   Out: 350 [Urine:350]        Physical Exam:  General Appearance: Elderly white female resting in bed she really doesn't appear in acute distress she is on Levophed at 0.2 mics per kilogram per minute  Eyes: Conjunctiva are clear and anicteric  ENT: Mucous membranes are dry no exudates  Neck: No JVD trachea midline no palpable adenopathy or thyromegaly  Lungs: Clear I don't hear any wheezes rales or rhonchi symmetric chest expansion she is not labored  Cardiac: Regular rhythm she is a little tachycardic no murmur  Abdomen: Is distended but soft knowing there is a mass that are still really can't palpate any margins of the mass.  She doesn't have any real right upper quadrant tenderness  : Turner catheter to dependent drainage  Musculoskeletal: Grossly normal  Skin: No jaundice, no petechiae no rashes noted  Neuro: She is pleasantly confused she is following commands moving her extremities  Extremities/P Vascular: No  clubbing no cyanosis no edema palpable radial dorsalis pedis pulses  MSE: She is just asking to go home       Labs:  Results from last 7 days   Lab Units  12/01/18   0539  11/30/18   1558   GLUCOSE mg/dL  87  81   SODIUM mmol/L  142  143   POTASSIUM mmol/L  2.2*  2.3*   MAGNESIUM mg/dL   --   1.9   CO2 mmol/L  25.3  28.9   CHLORIDE mmol/L  101  98   ANION GAP mmol/L  15.7  16.1   CREATININE mg/dL  0.94  1.19*   BUN mg/dL  20  20   BUN / CREAT RATIO   21.3  16.8   CALCIUM mg/dL  8.4*  9.4   EGFR IF NONAFRICN AM mL/min/1.73  56*  43*   ALK PHOS U/L  153*  213*   TOTAL PROTEIN g/dL  5.7*  7.3   ALT (SGPT) U/L  41*  59*   AST (SGOT) U/L  33*  50*   BILIRUBIN mg/dL  1.1  1.4*   ALBUMIN g/dL  2.20*  3.20*   GLOBULIN gm/dL  3.5  4.1     Estimated Creatinine Clearance: 39.1 mL/min (by C-G formula based on SCr of 0.94 mg/dL).      Results from last 7 days   Lab Units  12/01/18   0539  11/30/18   1558   WBC 10*3/mm3  14.17*  11.71*   RBC 10*6/mm3  3.45*  3.95   HEMOGLOBIN g/dL  10.5*  12.4   HEMATOCRIT %  33.4*  38.3   MCV fL  96.8  97.0   MCH pg  30.4  31.4   MCHC g/dL  31.4*  32.4   RDW %  13.9*  13.9*   RDW-SD fl  49.3  49.9   MPV fL  10.9  11.1   PLATELETS 10*3/mm3  105*  107*   NEUTROPHIL % %  78.9*  80.1*   LYMPHOCYTE % %  9.2*  9.9*   MONOCYTES % %  11.1  9.2   EOSINOPHIL % %  0.1*  0.1*   BASOPHIL % %  0.1  0.0   IMM GRAN % %  0.6*  0.7*   NEUTROS ABS 10*3/mm3  11.18*  9.38*   LYMPHS ABS 10*3/mm3  1.31  1.16   MONOS ABS 10*3/mm3  1.57*  1.08   EOS ABS 10*3/mm3  0.01  0.01   BASOS ABS 10*3/mm3  0.01  0.00   IMMATURE GRANS (ABS) 10*3/mm3  0.09*  0.08*         Results from last 7 days   Lab Units  11/30/18   1558   TROPONIN T ng/mL  0.014             Results from last 7 days   Lab Units  11/30/18   2351  11/30/18   1732  11/30/18   1558   LACTATE mmol/L  1.6  3.7*   --    PROCALCITONIN ng/mL   --    --   0.89*         Microbiology Results (last 10 days)     Procedure Component Value - Date/Time    Blood Culture - Blood,  Arm, Left [[id number]] Collected:  11/30/18 1805    Lab Status:  Preliminary result Specimen:  Blood from Arm, Left Updated:  12/01/18 0615     Blood Culture No growth at less than 24 hours    Blood Culture - Blood, Arm, Right [[id number]] Collected:  11/30/18 1732    Lab Status:  Preliminary result Specimen:  Blood from Arm, Right Updated:  12/01/18 0600     Blood Culture No growth at less than 24 hours                piperacillin-tazobactam 3.375 g Intravenous Q8H   potassium chloride 10 mEq Intravenous Once       lactated ringers 125 mL/hr Last Rate: 125 mL/hr (11/30/18 1907)   norepinephrine 0.02-0.3 mcg/kg/min Last Rate: Stopped (12/01/18 0934)       Diagnostics:  Ct Head Without Contrast    Result Date: 11/30/2018  CT HEAD WITHOUT CONTRAST  HISTORY: Fall, on blood thinners.  A noncontrasted CT examination of the brain was performed.  FINDINGS: The brain and ventricles are symmetrical. There is no evidence of intracranial hemorrhage, hydrocephalus or of abnormal extra-axial fluid. No focal area of decreased attenuation to suggest acute infarction is identified. Moderate vascular calcification is noted. Bone windows showed no evidence of a calvarial fracture.      No evidence of fracture or hemorrhage. Age-appropriate atrophy and vascular calcification noted similar to 11/11/2018. If indicated, further evaluation could be performed with a MRI examination of the brain.    Radiation dose reduction techniques were utilized, including automated exposure control and exposure modulation based on body size.  This report was finalized on 11/30/2018 6:38 PM by Dr. Dev Power M.D.      Ct Head Without Contrast    Result Date: 11/11/2018  CT HEAD WO CONTRAST-  INDICATIONS: Hallucinations  TECHNIQUE: Radiation dose reduction techniques were utilized, including automated exposure control and exposure modulation based on body size.    Noncontrast head CT  COMPARISON: None available  FINDINGS:       No acute intracranial  hemorrhage, midline shift or mass effect. No acute territorial infarct is identified. Basal ganglia mineralization is seen.  Mild periventricular hypodensities suggest chronic small vessel ischemic change in a patient this age.  Arterial calcifications are seen in the base of the brain.  Ventricles, cisterns, cerebral sulci are unremarkable for patient age.  The visualized paranasal sinuses, orbits, mastoid air cells are unremarkable.               No acute intracranial hemorrhage or hydrocephalus. Chronic appearing changes. If there is further clinical concern, MRI could be considered for further evaluation.  This report was finalized on 11/11/2018 2:03 PM by Dr. Norberto Estrada M.D.      Ct Abdomen Pelvis With Contrast    Result Date: 11/30/2018  CT ABDOMEN PELVIS W CONTRAST-  CLINICAL HISTORY: Right upper quadrant. Dementia.  TECHNIQUE: Spiral CT images were acquired through the abdomen and pelvis with no oral or IV contrast and were reconstructed in 3 mm thick slices.  Radiation dose reduction techniques were utilized, including automated exposure control and exposure modulation based on body size.  COMPARISON: None  FINDINGS: The striking finding on this exam is extremely large unilocular cystic mass essentially filling the entire abdomen and portions of the pelvis. The mass measures 24.8 cm in greatest cephalocaudad dimension and 13.6 cm in greatest AP dimension and 26.2 cm in greatest mediolateral dimension. This is most likely ovarian in origin. The mass superiorly displaces all of the abdominal organs and small bowel. Inferiorly, it compresses the dome of the urinary bladder. The bladder is markedly distended with urine. Markedly compresses the third portion of the duodenum. However, the stomach and proximal duodenum are not pathologically distended. There is no definite evidence of obstruction. No enhancing mural nodules or septations are present within the mass. There are multiple gallstones in the  neck of the gallbladder. The gallbladder is moderately distended. There is mucosal hyperenhancement within the gallbladder and also moderate pericholecystic edema. The findings are consistent with acute cholecystitis. There is no bile duct dilatation. A few small hepatic cysts are noted. There are no solid liver masses. The spleen and pancreas and adrenal glands are unremarkable. There are a few small nonobstructing calculi in the right kidney. There are also multiple areas of cortical thinning in the right kidney consistent with scarring. There is no hydronephrosis. There is a small amount of ascites adjacent to the liver and also in the pelvis. The uterus is absent. A small posteriorly layering left pleural effusion is present.      Extremely large unilocular cystic mass filling the abdomen that is most likely ovarian in origin as described more detail above. Cholelithiasis and evidence of acute cholecystitis as described. Minimal ascites. Small left pleural effusion.  This report was finalized on 11/30/2018 7:24 PM by Dr. Arturo Chawla M.D.      Xr Chest 1 View    Result Date: 11/30/2018  PORTABLE CHEST 11/30/2018 AT 4:47 PM  CLINICAL HISTORY: Hypotension. CHF.  Compared to the previous chest x-ray dated 8/18/2018.  The lungs are somewhat poorly inflated, but appear free of infiltrates. There are no pleural effusions. Calcified right hilar lymph nodes are noted. The heart is borderline enlarged and unchanged.  IMPRESSIONS: No evidence of acute disease within the chest.  This report was finalized on 11/30/2018 5:03 PM by Dr. Arturo Chawla M.D.      Results for orders placed during the hospital encounter of 04/04/18   Adult Transthoracic Echo Complete W/ Cont if Necessary Per Protocol    Narrative · Moderate-to-severe mitral valve regurgitation is present  · Mild to moderate tricuspid valve regurgitation is present.  · Left atrial cavity size is mildly dilated.  · Calculated EF = 50%.  · There is no evidence of  pericardial effusion.          Have reviewed the CT abdomen pelvis    Active Hospital Problems    Diagnosis Date Noted   • Septic shock (CMS/HCC) [A41.9, R65.21] 11/30/2018      Resolved Hospital Problems   No resolved problems to display.         Assessment/Plan     1. Acute cholecystitis with choledocholithiasis  2. Septic shock requiring vasopressor therapy.  3. Large cystic mass engulfing most of the pelvis and abdomen Speck GYN origin.  4. Urinary obstruction secondary to mass  5. Coronary artery disease severe  6. Valvular heart disease moderate to severe mitral regurgitation  7. Dementia  8. Hypokalemia  9. Elevated liver function tests secondary to #1  10.  anemia  11. Acute kidney injury improved    I had a long discussion with the patient's sister CARE surrogate and power of  next of kin and her family.  They have decided to pursue a palliative hospice course they note she never wanted to be on life support machines and would not want aggressive therapy in this situation.  .  We are going to discontinue all aggressive therapy transfer her to the palliative care unit and initiate comfort care.  We'll consult hospice    Plan for disposition:    Anshu Alanis MD  12/01/18  11:43 AM    Time: I have spent the over 48 minutes on the critical care time with the patient today

## 2018-12-01 NOTE — ED NOTES
Nursing report ED to floor  Marie Delgado  89 y.o.  female    HPI (triage note):   Chief Complaint   Patient presents with   • Abdominal Pain     right sided abd pain for a few days, denies n/d. diarrhea x 2 days.       Admitting doctor:   Saurabh Cosby MD    Admitting diagnosis:   The primary encounter diagnosis was Septic shock (CMS/HCC). Diagnoses of Acute cholecystitis and Ovarian mass were also pertinent to this visit.    Code status:   Current Code Status     Date Active Code Status Order ID Comments User Context       Prior          Allergies:   Codeine    Weight:       11/30/18  1540   Weight: 64.9 kg (143 lb)       Most recent vitals:   Vitals:    11/30/18 1952 11/30/18 2003 11/30/18 2011 11/30/18 2023   BP: (!) 84/64 95/61 107/69 113/79   BP Location: Right arm Right arm Right arm Right arm   Patient Position: Lying Lying Lying Lying   Pulse: 64 81 83 103   Resp:   16    Temp:       TempSrc:       SpO2: 93% 95% 98% 98%   Weight:       Height:           Active LDAs/IV Access:   Lines, Drains & Airways    Active LDAs     Name:   Placement date:   Placement time:   Site:   Days:    Peripheral IV 11/30/18 1558 Left Antecubital   11/30/18    1558    Antecubital   less than 1    Peripheral IV 11/30/18 1735 Right Antecubital   11/30/18    1735    Antecubital   less than 1                Labs (abnormal labs have a star):   Labs Reviewed   COMPREHENSIVE METABOLIC PANEL - Abnormal; Notable for the following components:       Result Value    Creatinine 1.19 (*)     Potassium 2.3 (*)     Albumin 3.20 (*)     ALT (SGPT) 59 (*)     AST (SGOT) 50 (*)     Alkaline Phosphatase 213 (*)     Total Bilirubin 1.4 (*)     eGFR Non  Amer 43 (*)     All other components within normal limits    Narrative:     The MDRD GFR formula is only valid for adults with stable renal function between ages 18 and 70.   URINALYSIS W/ MICROSCOPIC IF INDICATED (NO CULTURE) - Abnormal; Notable for the following components:    Color, UA  "Dark Yellow (*)     Blood, UA Large (3+) (*)     Protein, UA 30 mg/dL (1+) (*)     Leuk Esterase, UA Trace (*)     All other components within normal limits   CBC WITH AUTO DIFFERENTIAL - Abnormal; Notable for the following components:    WBC 11.71 (*)     RDW 13.9 (*)     Platelets 107 (*)     Neutrophil % 80.1 (*)     Lymphocyte % 9.9 (*)     Eosinophil % 0.1 (*)     Immature Grans % 0.7 (*)     Neutrophils, Absolute 9.38 (*)     Immature Grans, Absolute 0.08 (*)     All other components within normal limits   LACTIC ACID, PLASMA - Abnormal; Notable for the following components:    Lactate 3.7 (*)     All other components within normal limits   PROCALCITONIN - Abnormal; Notable for the following components:    Procalcitonin 0.89 (*)     All other components within normal limits    Narrative:     As a Marker for Sepsis (Non-Neonates):   1. <0.5 ng/mL represents a low risk of severe sepsis and/or septic shock.  1. >2 ng/mL represents a high risk of severe sepsis and/or septic shock.    As a Marker for Lower Respiratory Tract Infections that require antibiotic therapy:  PCT on Admission     Antibiotic Therapy             6-12 Hrs later  > 0.5                Strongly Recommended            >0.25 - <0.5         Recommended  0.1 - 0.25           Discouraged                   Remeasure/reassess PCT  <0.1                 Strongly Discouraged          Remeasure/reassess PCT      As 28 day mortality risk marker: \"Change in Procalcitonin Result\" (> 80 % or <=80 %) if Day 0 (or Day 1) and Day 4 values are available. Refer to http://www.[employer]-pct-calculator.com/   Change in PCT <=80 %   A decrease of PCT levels below or equal to 80 % defines a positive change in PCT test result representing a higher risk for 28-day all-cause mortality of patients diagnosed with severe sepsis or septic shock.  Change in PCT > 80 %   A decrease of PCT levels of more than 80 % defines a negative change in PCT result representing a lower risk for " 28-day all-cause mortality of patients diagnosed with severe sepsis or septic shock.               URINALYSIS, MICROSCOPIC ONLY - Abnormal; Notable for the following components:    RBC, UA Too Numerous to Count (*)     All other components within normal limits   LIPASE - Normal   MAGNESIUM - Normal   AMMONIA - Normal   TROPONIN (IN-HOUSE) - Normal    Narrative:     Troponin T Reference Ranges:  Less than 0.03 ng/mL:    Negative for AMI  0.03 to 0.09 ng/mL:      Indeterminant for AMI  Greater than 0.09 ng/mL: Positive for AMI   BLOOD CULTURE   BLOOD CULTURE   RAINBOW DRAW    Narrative:     The following orders were created for panel order [place] Draw.  Procedure                               Abnormality         Status                     ---------                               -----------         ------                     Light Blue Top[[id number]]                                   Final result               Green Top (Gel)[[id number]]                                  Final result               Lavender Top[[id number]]                                     Final result               Gold Top - SST[[id number]]                                   Final result                 Please view results for these tests on the individual orders.   LACTIC ACID REFLEX TIMER   CBC AND DIFFERENTIAL    Narrative:     The following orders were created for panel order CBC & Differential.  Procedure                               Abnormality         Status                     ---------                               -----------         ------                     CBC Auto Differential[[id number]]        Abnormal            Final result                 Please view results for these tests on the individual orders.   LIGHT BLUE TOP   GREEN TOP   LAVENDER TOP   GOLD TOP - SST       EKG:   ECG 12 Lead   Final Result   RR Interval= 741 ms   DE Interval= ms   QRSD Interval= 140 ms   QT Interval= 392 ms   QTc Interval= 455 ms   Heart Rate= 81 ms   P  Axis= deg   QRS Axis= -55 deg   T Wave Axis= 194 deg   I: 40 Axis= -14 deg   T: 40 Axis= -69 deg   ST Axis= 178 deg   Atrial fibrillation   Left bundle branch block   Compared to the previous tracing there is poor R-wave progression    anteriorly and a shift in axis.  The rate is also slow her   Electronically Signed by:  Shawn Mccracken ([facility]) 30-Nov-2018 18:40:05   Date and Time of Study: 2018-11-30 16:51:44          Meds given in ED:   Medications   sodium chloride 0.9 % flush 10 mL (not administered)   potassium chloride 10 mEq in 100 mL IVPB (0 mEq Intravenous Stopped 11/30/18 2012)     And   potassium chloride 10 mEq in 100 mL IVPB (0 mEq Intravenous Stopped 11/30/18 1902)     And   potassium chloride 10 mEq in 100 mL IVPB (10 mEq Intravenous New Bag 11/30/18 2013)     And   potassium chloride 10 mEq in 100 mL IVPB (not administered)     And   potassium chloride 10 mEq in 100 mL IVPB (not administered)     And   potassium chloride 10 mEq in 100 mL IVPB (not administered)   lactated ringers infusion (125 mL/hr Intravenous New Bag 11/30/18 1907)   norepinephrine (LEVOPHED) 8 mg/250 mL (32 mcg/mL) in sodium chloride 0.9% infusion (premix) (0.02 mcg/kg/min × 64.9 kg Intravenous New Bag 11/30/18 1959)   lactated ringers bolus 500 mL (0 mL Intravenous Stopped 11/30/18 1641)   lactated ringers bolus 1,500 mL (0 mL Intravenous Stopped 11/30/18 1841)   cefepime (MAXIPIME) 2 g/100 mL 0.9% NS (mbp) (0 g Intravenous Stopped 11/30/18 1924)   vancomycin 1250 mg/250 mL 0.9% NS IVPB ([facility]) (1,250 mg Intravenous New Bag 11/30/18 1926)   iopamidol (ISOVUE-300) 61 % injection 100 mL (85 mL Intravenous Given by Other 11/30/18 1818)       Imaging results:  Ct Head Without Contrast    Result Date: 11/30/2018  No evidence of fracture or hemorrhage. Age-appropriate atrophy and vascular calcification noted similar to 11/11/2018. If indicated, further evaluation could be performed with a MRI examination of the brain.    Radiation dose  reduction techniques were utilized, including automated exposure control and exposure modulation based on body size.  This report was finalized on 11/30/2018 6:38 PM by Dr. Dev Power M.D.      Ct Abdomen Pelvis With Contrast    Result Date: 11/30/2018  Extremely large unilocular cystic mass filling the abdomen that is most likely ovarian in origin as described more detail above. Cholelithiasis and evidence of acute cholecystitis as described. Minimal ascites. Small left pleural effusion.  This report was finalized on 11/30/2018 7:24 PM by Dr. Arturo Chawla M.D.        Ambulatory status:   - bedrest    Social issues:   Social History     Socioeconomic History   • Marital status: Single     Spouse name: Not on file   • Number of children: Not on file   • Years of education: Not on file   • Highest education level: Not on file   Social Needs   • Financial resource strain: Not on file   • Food insecurity - worry: Not on file   • Food insecurity - inability: Not on file   • Transportation needs - medical: Not on file   • Transportation needs - non-medical: Not on file   Occupational History   • Not on file   Tobacco Use   • Smoking status: Never Smoker   • Smokeless tobacco: Never Used   Substance and Sexual Activity   • Alcohol use: No   • Drug use: Defer   • Sexual activity: Defer   Other Topics Concern   • Not on file   Social History Narrative   • Not on file        Lis Mcgee RN  11/30/18 203

## 2018-12-01 NOTE — PLAN OF CARE
Problem: Patient Care Overview  Goal: Plan of Care Review  Outcome: Ongoing (interventions implemented as appropriate)   12/01/18 0746   Coping/Psychosocial   Plan of Care Reviewed With patient;family   Plan of Care Review   Progress no change   OTHER   Outcome Summary K+ replaced. AM K 2.2. Requiring low dose lephophed.

## 2018-12-01 NOTE — CONSULTS
REASON FOR CONSULT:    Abdominal pain    REQUESTING PHYSICIAN:    DEBORAH Murillo    HISTORY OF PRESENT ILLNESS:    Marie Delgado is a 89 y.o. female who is brought to the emergency department today from home with abdominal pain.  The patient has Alzheimer's dementia.  She is currently not talkative.  The history has been obtained from chart and discussions with other treating physicians.  She has right-sided abdominal pain.  She has a distended abdomen. It has been enlarging over the last several months.    She was recently admitted to the hospital for psychiatric admission because of declining functional status from her Alzheimer's dementia.    Past Medical History:   Diagnosis Date   • Arthritis    • Basal cell adenocarcinoma     on her nose   • CHF (congestive heart failure) (CMS/HCC)    • Dementia    • Hyperlipidemia    • Hypertension    • Onychomycosis    • Vitamin D deficiency disease        Past Surgical History:   Procedure Laterality Date   • APPENDECTOMY     • CATARACT EXTRACTION     • CORNEAL TRANSPLANT     • HEMORROIDECTOMY     • TONSILLECTOMY           Current Facility-Administered Medications:   •  lactated ringers infusion, 125 mL/hr, Intravenous, Continuous, Barbara Kulkarni PA, Last Rate: 125 mL/hr at 11/30/18 1907, 125 mL/hr at 11/30/18 1907  •  norepinephrine (LEVOPHED) 8 mg/250 mL (32 mcg/mL) in sodium chloride 0.9% infusion (premix), 0.02-0.3 mcg/kg/min, Intravenous, Titrated, Barbara Kulkarni PA, Last Rate: 2.43 mL/hr at 11/30/18 1959, 0.02 mcg/kg/min at 11/30/18 1959  •  [COMPLETED] potassium chloride 10 mEq in 100 mL IVPB, 10 mEq, Intravenous, Once, Stopped at 11/30/18 2012 **AND** [COMPLETED] potassium chloride 10 mEq in 100 mL IVPB, 10 mEq, Intravenous, Once, Stopped at 11/30/18 1902 **AND** [COMPLETED] potassium chloride 10 mEq in 100 mL IVPB, 10 mEq, Intravenous, Once, Last Rate: 100 mL/hr at 11/30/18 2013, 10 mEq at 11/30/18 2013 **AND** potassium chloride 10 mEq in 100 mL IVPB, 10  "mEq, Intravenous, Once **AND** potassium chloride 10 mEq in 100 mL IVPB, 10 mEq, Intravenous, Once **AND** potassium chloride 10 mEq in 100 mL IVPB, 10 mEq, Intravenous, Once **AND** Potassium, , , PRN, Barbara Kulkarni PA  •  [COMPLETED] Insert peripheral IV, , , Once **AND** sodium chloride 0.9 % flush 10 mL, 10 mL, Intravenous, PRN, Barbara Kulkarni PA    Allergies   Allergen Reactions   • Codeine Nausea Only       Family History   Problem Relation Age of Onset   • Cancer Sister    • Diabetes Maternal Grandmother    • Diabetes Paternal Grandfather        Social History     Socioeconomic History   • Marital status: Single     Spouse name: Not on file   • Number of children: Not on file   • Years of education: Not on file   • Highest education level: Not on file   Social Needs   • Financial resource strain: Not on file   • Food insecurity - worry: Not on file   • Food insecurity - inability: Not on file   • Transportation needs - medical: Not on file   • Transportation needs - non-medical: Not on file   Occupational History   • Not on file   Tobacco Use   • Smoking status: Never Smoker   • Smokeless tobacco: Never Used   Substance and Sexual Activity   • Alcohol use: No   • Drug use: Defer   • Sexual activity: Defer   Other Topics Concern   • Not on file   Social History Narrative   • Not on file       Review of Systems   Unable to perform ROS: Dementia       Objective     /79 (BP Location: Right arm, Patient Position: Lying)   Pulse 80   Temp 96.1 °F (35.6 °C) (Tympanic)   Resp 16   Ht 162.6 cm (64\")   Wt 61 kg (134 lb 7.7 oz)   LMP  (LMP Unknown)   SpO2 93%   BMI 23.08 kg/m²     Physical Exam   Constitutional: She is oriented to person, place, and time. She appears well-developed and well-nourished. No distress.   HENT:   Head: Normocephalic and atraumatic.   Right Ear: External ear normal.   Left Ear: External ear normal.   Eyes: Conjunctivae are normal. No scleral icterus.   Neck: Neck supple. No " JVD present. No tracheal deviation present.   Cardiovascular: Normal rate and regular rhythm. Exam reveals no friction rub.   No murmur heard.  Irregular rhythm   Pulmonary/Chest: Effort normal and breath sounds normal. No respiratory distress. She has no wheezes. She has no rales.   Abdominal: Soft. Bowel sounds are normal. She exhibits distension. She exhibits no mass. There is no tenderness. There is no rebound and no guarding. No hernia.   Musculoskeletal: She exhibits no edema or deformity.   Lymphadenopathy:     She has no cervical adenopathy.   Neurological: She is alert and oriented to person, place, and time.   Skin: Skin is warm and dry.   Psychiatric: She has a normal mood and affect. Her behavior is normal.       DIAGNOSTIC DATA:    Creatinine 1.19, CO2 28.9.    Lactate 3.7  AST 50, ALT 59, AlkP 213, TBili 1.4, Ammonia 27  Lipase 37   WBC 11.71, Hgb 12.4, platelets 107.    I reviewed the images and the report of a CT scan of the abdomen and pelvis performed today.  It shows a large unilocular cystic mass originating from the pelvis. There is distention of the gallbladder with mucosal enhancement.  There are gallstones.    ASSESSMENT:    Cholecystitis   Cholelithiasis   Large pelvic cystic mass   Atrial fibrillation requiring anticoagulation with apixaban  Alzheimer's dementia     PLAN:    She is being admitted to the intensive care unit.  Cefepime and vancomycin were started.  Apixaban is on hold.  Awaiting gynecology assessment.  Will discuss further with family. Of note she is DNR and DNI.

## 2018-12-01 NOTE — H&P
.     Admission Note    Patient Identification:  Marie Delgado  89 y.o.  female  6/6/1929  [id number]            CC: abd pain    History of Present Illness:  Patient is an 89-year-old female with a history of dementia Alzheimer's type.  She was brought to the emergency room secondary to abdominal pain ongoing over the past 2 days.  Has come and gone however it is now fairly severe.  Patient was noted to have very concentrated orange urine and inability to take by mouth intake.  Apparently abdominal distention has been noted now for months.  In the emergency room a CT scan of abdomen revealed a large cystic mass as well as sinuses appear to be acute cholecystitis.    Of note patient has significant cardiac history is found to have 90% stenosis of the LAD diagonal and circumflex.  However felt not to be a surgical candidate at that time.  Patient is also had multiple inpatient admissions secondary to delusional thinking and abnormal behavior secondary to her Alzheimer's dementia.    Patient isnt able to give much clinical history.  She asks if she can leave.  States she wants orange juice.  Pain in her right side and arm where K runs going in.      Past Medical History:   Diagnosis Date   • Arthritis    • Basal cell adenocarcinoma     on her nose   • CHF (congestive heart failure) (CMS/[facility])    • Dementia    • Hyperlipidemia    • Hypertension    • Onychomycosis    • Vitamin D deficiency disease        Past Surgical History:   Procedure Laterality Date   • APPENDECTOMY     • CATARACT EXTRACTION     • CORNEAL TRANSPLANT     • HEMORROIDECTOMY     • TONSILLECTOMY          Social History     Socioeconomic History   • Marital status: Single     Spouse name: Not on file   • Number of children: Not on file   • Years of education: Not on file   • Highest education level: Not on file   Social Needs   • Financial resource strain: Not on file   • Food insecurity - worry: Not on file   • Food insecurity - inability: Not on file    • Transportation needs - medical: Not on file   • Transportation needs - non-medical: Not on file   Occupational History   • Not on file   Tobacco Use   • Smoking status: Never Smoker   • Smokeless tobacco: Never Used   Substance and Sexual Activity   • Alcohol use: No   • Drug use: Defer   • Sexual activity: Defer   Other Topics Concern   • Not on file   Social History Narrative   • Not on file       Family History   Problem Relation Age of Onset   • Cancer Sister    • Diabetes Maternal Grandmother    • Diabetes Paternal Grandfather          (Not in a hospital admission)    Allergies   Allergen Reactions   • Codeine Nausea Only       Review of Systems:  Unable to obtain accurate ROS as patient is unable to answer questions due to dementia.  Physical Exam:  Vitals:  Vitals:    11/30/18 1902 11/30/18 1918 11/1934 11/30/18 1938   BP: 96/67 98/52 (!) 78/45 (!) 86/65   BP Location: Right arm Right arm  Right arm   Patient Position: Lying Lying  Lying   Pulse: 71 68 82 75   Resp:       Temp:       TempSrc:       SpO2: 94% 97% 92% 95%   Weight:       Height:               Body mass index is 24.55 kg/m².    Intake/Output Summary (Last 24 hours) at 11/30/2018 1941  Last data filed at 11/30/2018 1924  Gross per 24 hour   Intake 2200 ml   Output --   Net 2200 ml       Exam:  GENERAL:  Awake ill appearing but not toxic  HEENT:  EOMI, nonicteric sclera, no JVD dry mucous membranes  PULMONARY:    CTAB, no wheeze, no crackles, no rhonchi, symmetric air entry  CARDIAC:  RRR no MRG, S1 S2  ABD: distended with palpable mass extending form RUQ down to pelvis which you can locate btw your hands, really no tenderness in the RUQ on my exam, hood sign neg, no rebound, no rigidity   EXT: no c/c/e, pulses symmetric 2+ bilaterally  NEURO:  CNs II-XII intact, move all ext, is not oriented, speech is intact  SKIN: no lesions, no rash  PSYCH:confused but not agitated at present  LYMPH: no cervical LAD    Scheduled meds:    potassium  chloride 10 mEq Intravenous Once   And      potassium chloride 10 mEq Intravenous Once   And      potassium chloride 10 mEq Intravenous Once   And      potassium chloride 10 mEq Intravenous Once   And      potassium chloride 10 mEq Intravenous Once       Data Review:   I reviewed the patient's medications and new clinical results.  Lab Results   Component Value Date    CALCIUM 9.4 11/30/2018     Results from last 7 days   Lab Units  11/30/18   1558   AST (SGOT) U/L  50*   MAGNESIUM mg/dL  1.9   SODIUM mmol/L  143   POTASSIUM mmol/L  2.3*   CHLORIDE mmol/L  98   CO2 mmol/L  28.9   BUN mg/dL  20   CREATININE mg/dL  1.19*   GLUCOSE mg/dL  81   CALCIUM mg/dL  9.4   WBC 10*3/mm3  11.71*   HEMOGLOBIN g/dL  12.4   PLATELETS 10*3/mm3  107*   ALT (SGPT) U/L  59*     Results from last 7 days   Lab Units  11/30/18   1558   TROPONIN T ng/mL  0.014         Estimated Creatinine Clearance: 32.8 mL/min (A) (by C-G formula based on SCr of 1.19 mg/dL (H)).    IMAGING:  I have reviewed all imaging studies since my last documentation.  Imaging Results (most recent)     Procedure Component Value Units Date/Time    CT Abdomen Pelvis With Contrast [[id number]] Collected:  11/30/18 1909     Updated:  11/30/18 1927    Narrative:       CT ABDOMEN PELVIS W CONTRAST-     CLINICAL HISTORY: Right upper quadrant. Dementia.     TECHNIQUE: Spiral CT images were acquired through the abdomen and pelvis  with no oral or IV contrast and were reconstructed in 3 mm thick slices.     Radiation dose reduction techniques were utilized, including automated  exposure control and exposure modulation based on body size.     COMPARISON: None     FINDINGS: The striking finding on this exam is extremely large  unilocular cystic mass essentially filling the entire abdomen and  portions of the pelvis. The mass measures 24.8 cm in greatest  cephalocaudad dimension and 13.6 cm in greatest AP dimension and 26.2 cm  in greatest mediolateral dimension. This is most likely  ovarian in  origin. The mass superiorly displaces all of the abdominal organs and  small bowel. Inferiorly, it compresses the dome of the urinary bladder.  The bladder is markedly distended with urine. Markedly compresses the  third portion of the duodenum. However, the stomach and proximal  duodenum are not pathologically distended. There is no definite evidence  of obstruction. No enhancing mural nodules or septations are present  within the mass. There are multiple gallstones in the neck of the  gallbladder. The gallbladder is moderately distended. There is mucosal  hyperenhancement within the gallbladder and also moderate  pericholecystic edema. The findings are consistent with acute  cholecystitis. There is no bile duct dilatation. A few small hepatic  cysts are noted. There are no solid liver masses. The spleen and  pancreas and adrenal glands are unremarkable. There are a few small  nonobstructing calculi in the right kidney. There are also multiple  areas of cortical thinning in the right kidney consistent with scarring.  There is no hydronephrosis. There is a small amount of ascites adjacent  to the liver and also in the pelvis. The uterus is absent. A small  posteriorly layering left pleural effusion is present.       Impression:       Extremely large unilocular cystic mass filling the abdomen  that is most likely ovarian in origin as described more detail above.  Cholelithiasis and evidence of acute cholecystitis as described. Minimal  ascites. Small left pleural effusion.     This report was finalized on 11/30/2018 7:24 PM by Dr. Arturo Chawla M.D.       CT Head Without Contrast [[id number]] Collected:  11/30/18 1833     Updated:  11/30/18 1847    Narrative:       CT HEAD WITHOUT CONTRAST     HISTORY: Fall, on blood thinners.     A noncontrasted CT examination of the brain was performed.     FINDINGS: The brain and ventricles are symmetrical. There is no evidence  of intracranial hemorrhage,  hydrocephalus or of abnormal extra-axial  fluid. No focal area of decreased attenuation to suggest acute  infarction is identified. Moderate vascular calcification is noted. Bone  windows showed no evidence of a calvarial fracture.       Impression:       No evidence of fracture or hemorrhage. Age-appropriate  atrophy and vascular calcification noted similar to 11/11/2018. If  indicated, further evaluation could be performed with a MRI examination  of the brain.           Radiation dose reduction techniques were utilized, including automated  exposure control and exposure modulation based on body size.     This report was finalized on 11/30/2018 6:38 PM by Dr. Dev Power M.D.       XR Chest 1 View [[id number]] Collected:  11/30/18 1702     Updated:  11/30/18 1707    Narrative:       PORTABLE CHEST 11/30/2018 AT 4:47 PM     CLINICAL HISTORY: Hypotension. CHF.     Compared to the previous chest x-ray dated 8/18/2018.     The lungs are somewhat poorly inflated, but appear free of infiltrates.  There are no pleural effusions. Calcified right hilar lymph nodes are  noted. The heart is borderline enlarged and unchanged.     IMPRESSIONS: No evidence of acute disease within the chest.     This report was finalized on 11/30/2018 5:03 PM by Dr. Arturo Chawla M.D.             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  Patient has  septic shock acute hypokalemia and acute cholecystitis with resulting acute kidney injury.  She has baseline severe coronary artery disease with 90% stenosis and multiple vessels.  Confounding the acute cholecystitis is a large cystic mass taking up most of her abdomen.  For now we will continue want resuscitation antibiotics.  Continue her on vasopressor medications.  The family does have limitations to our care does not wish CPR or mechanical ventilation.  After discussion they do not want a large IV in the neck however will give consent for a PICC line placement.  Right now we'll go ahead and re-bolused more IV fluids to see if we can wean down our vasopressor requirements.  General surgery was consult for the acute cholecystitis however the large cystic mass will complicate any surgical approach as well as the patient's advanced age and cardiac comorbidities with left-sided valvular disease and severe coronary disease.  We will go ahead and trend out the lactate    I expressed my concerns regarding her prognosis to her sister who takes care of her and makes decisions for her.  I also discussed the case with the patient's grand nephew who is involved in the patient's care.  Additional family members on the way to the hospital to further discuss her goals of care.    I have discussed the case with the ER care team.    DNR  DNI      Total critical care time was 60 minutes, excluding any separately billable procedure time.    Saurabh Cosby MD  [facility]  11/30/18  851pm

## 2018-12-01 NOTE — PLAN OF CARE
Problem: Patient Care Overview  Goal: Plan of Care Review  Outcome: Ongoing (interventions implemented as appropriate)   12/01/18 0914   Coping/Psychosocial   Plan of Care Reviewed With patient;family   Plan of Care Review   Progress no change   OTHER   Outcome Summary Bedside swallow eval: Pt oral mech WFL for age, teeth present. Trials of ice, water via straw and cup, puree and mech soft. Pt w/ appropriate but slow oral phase, timely swallow, good laryngeal elevation, dry vocal quality, no s/s aspiration. Rec. soft diet with thin liquids.   Coping/Psychosocial   Patient Agreement with Plan of Care agrees

## 2018-12-02 NOTE — PROGRESS NOTES
LOS: 2 days   Patient Care Team:  Queenie Alas PA-C as PCP - General  Queenie Alas PA-C as PCP - Family Medicine  Elias Noyola MD as Consulting Physician (Cardiology)    Subjective     Patient is confused she is pleasant she wants to go home.  Her sister who is her healthcare surrogate and next of kin and caregiver along with her family are at bedside.  Not eating much and not handling secretions well    Review of Systems:   Unable to obtain patient confused      Objective     Vital Signs  Vital Sign Min/Max for last 24 hours  Temp  Min: 97.1 °F (36.2 °C)  Max: 98.1 °F (36.7 °C)   BP  Min: 89/63  Max: 94/63   Pulse  Min: 85  Max: 93   Resp  Min: 18  Max: 18   SpO2  Min: 91 %  Max: 96 %   No Data Recorded   No Data Recorded        Ventilator/Non-Invasive Ventilation Settings (From admission, onward)    None                       Body mass index is 23.08 kg/m².  I/O last 3 completed shifts:  In: 4311.2 [P.O.:600; I.V.:3261.2; IV Piggyback:450]  Out: 1830 [Urine:1830]  I/O this shift:  In: 799 [I.V.:799]  Out: -         Physical Exam:  General Appearance: Elderly white female resting in bed she really doesn't appear in acute distress   Eyes: Conjunctiva are clear and anicteric  ENT: Mucous membranes are dry no exudates  Neck: No JVD trachea midline no palpable adenopathy or thyromegaly  Lungs: Clear I don't hear any wheezes rales or rhonchi symmetric chest expansion she is not labored  Cardiac: Regular rhythm she is a little tachycardic no murmur  Abdomen: Is distended but soft knowing there is a mass that are still really can't palpate any margins of the mass.  She doesn't have any real right upper quadrant tenderness  : Turner catheter to dependent drainage  Musculoskeletal: Grossly normal  Skin: No jaundice, no petechiae no rashes noted  Neuro: She is pleasantly confused she is following commands moving her extremities  Extremities/P Vascular: No clubbing no cyanosis no edema palpable radial  dorsalis pedis pulses  MSE: She is still just asking to go home       Labs:  Results from last 7 days   Lab Units  12/01/18   0539  11/30/18   1558   GLUCOSE mg/dL  87  81   SODIUM mmol/L  142  143   POTASSIUM mmol/L  2.2*  2.3*   MAGNESIUM mg/dL   --   1.9   CO2 mmol/L  25.3  28.9   CHLORIDE mmol/L  101  98   ANION GAP mmol/L  15.7  16.1   CREATININE mg/dL  0.94  1.19*   BUN mg/dL  20  20   BUN / CREAT RATIO   21.3  16.8   CALCIUM mg/dL  8.4*  9.4   EGFR IF NONAFRICN AM mL/min/1.73  56*  43*   ALK PHOS U/L  153*  213*   TOTAL PROTEIN g/dL  5.7*  7.3   ALT (SGPT) U/L  41*  59*   AST (SGOT) U/L  33*  50*   BILIRUBIN mg/dL  1.1  1.4*   ALBUMIN g/dL  2.20*  3.20*   GLOBULIN gm/dL  3.5  4.1     Estimated Creatinine Clearance: 39.1 mL/min (by C-G formula based on SCr of 0.94 mg/dL).      Results from last 7 days   Lab Units  12/01/18   0539  11/30/18   1558   WBC 10*3/mm3  14.17*  11.71*   RBC 10*6/mm3  3.45*  3.95   HEMOGLOBIN g/dL  10.5*  12.4   HEMATOCRIT %  33.4*  38.3   MCV fL  96.8  97.0   MCH pg  30.4  31.4   MCHC g/dL  31.4*  32.4   RDW %  13.9*  13.9*   RDW-SD fl  49.3  49.9   MPV fL  10.9  11.1   PLATELETS 10*3/mm3  105*  107*   NEUTROPHIL % %  78.9*  80.1*   LYMPHOCYTE % %  9.2*  9.9*   MONOCYTES % %  11.1  9.2   EOSINOPHIL % %  0.1*  0.1*   BASOPHIL % %  0.1  0.0   IMM GRAN % %  0.6*  0.7*   NEUTROS ABS 10*3/mm3  11.18*  9.38*   LYMPHS ABS 10*3/mm3  1.31  1.16   MONOS ABS 10*3/mm3  1.57*  1.08   EOS ABS 10*3/mm3  0.01  0.01   BASOS ABS 10*3/mm3  0.01  0.00   IMMATURE GRANS (ABS) 10*3/mm3  0.09*  0.08*         Results from last 7 days   Lab Units  11/30/18   1558   TROPONIN T ng/mL  0.014             Results from last 7 days   Lab Units  11/30/18   2351  11/30/18   1732  11/30/18   1558   LACTATE mmol/L  1.6  3.7*   --    PROCALCITONIN ng/mL   --    --   0.89*         Microbiology Results (last 10 days)     Procedure Component Value - Date/Time    Blood Culture - Blood, Arm, Left [[id number]]  (Abnormal)  Collected:  11/30/18 1805    Lab Status:  Preliminary result Specimen:  Blood from Arm, Left Updated:  12/02/18 0916     Blood Culture Abnormal Stain     Gram Stain Anaerobic Bottle Gram positive cocci in clusters    Blood Culture ID, PCR - Blood, Arm, Left [[id number]]  (Normal) Collected:  11/30/18 1805    Lab Status:  Final result Specimen:  Blood from Arm, Left Updated:  12/02/18 0913     BCID, PCR Negative by BCID PCR. Culture to Follow.    Blood Culture - Blood, Arm, Right [[id number]] Collected:  11/30/18 1732    Lab Status:  Preliminary result Specimen:  Blood from Arm, Right Updated:  12/01/18 1800     Blood Culture No growth at 24 hours                Divalproex Sodium 250 mg Oral BID   donepezil 5 mg Oral Nightly   potassium chloride 10 mEq Intravenous Once          Diagnostics:  Ct Head Without Contrast    Result Date: 11/30/2018  CT HEAD WITHOUT CONTRAST  HISTORY: Fall, on blood thinners.  A noncontrasted CT examination of the brain was performed.  FINDINGS: The brain and ventricles are symmetrical. There is no evidence of intracranial hemorrhage, hydrocephalus or of abnormal extra-axial fluid. No focal area of decreased attenuation to suggest acute infarction is identified. Moderate vascular calcification is noted. Bone windows showed no evidence of a calvarial fracture.      No evidence of fracture or hemorrhage. Age-appropriate atrophy and vascular calcification noted similar to 11/11/2018. If indicated, further evaluation could be performed with a MRI examination of the brain.    Radiation dose reduction techniques were utilized, including automated exposure control and exposure modulation based on body size.  This report was finalized on 11/30/2018 6:38 PM by Dr. Dev Power M.D.      Ct Head Without Contrast    Result Date: 11/11/2018  CT HEAD WO CONTRAST-  INDICATIONS: Hallucinations  TECHNIQUE: Radiation dose reduction techniques were utilized, including automated exposure control and exposure  modulation based on body size.    Noncontrast head CT  COMPARISON: None available  FINDINGS:       No acute intracranial hemorrhage, midline shift or mass effect. No acute territorial infarct is identified. Basal ganglia mineralization is seen.  Mild periventricular hypodensities suggest chronic small vessel ischemic change in a patient this age.  Arterial calcifications are seen in the base of the brain.  Ventricles, cisterns, cerebral sulci are unremarkable for patient age.  The visualized paranasal sinuses, orbits, mastoid air cells are unremarkable.               No acute intracranial hemorrhage or hydrocephalus. Chronic appearing changes. If there is further clinical concern, MRI could be considered for further evaluation.  This report was finalized on 11/11/2018 2:03 PM by Dr. Norberto Estrada M.D.      Ct Abdomen Pelvis With Contrast    Result Date: 11/30/2018  CT ABDOMEN PELVIS W CONTRAST-  CLINICAL HISTORY: Right upper quadrant. Dementia.  TECHNIQUE: Spiral CT images were acquired through the abdomen and pelvis with no oral or IV contrast and were reconstructed in 3 mm thick slices.  Radiation dose reduction techniques were utilized, including automated exposure control and exposure modulation based on body size.  COMPARISON: None  FINDINGS: The striking finding on this exam is extremely large unilocular cystic mass essentially filling the entire abdomen and portions of the pelvis. The mass measures 24.8 cm in greatest cephalocaudad dimension and 13.6 cm in greatest AP dimension and 26.2 cm in greatest mediolateral dimension. This is most likely ovarian in origin. The mass superiorly displaces all of the abdominal organs and small bowel. Inferiorly, it compresses the dome of the urinary bladder. The bladder is markedly distended with urine. Markedly compresses the third portion of the duodenum. However, the stomach and proximal duodenum are not pathologically distended. There is no definite evidence of  obstruction. No enhancing mural nodules or septations are present within the mass. There are multiple gallstones in the neck of the gallbladder. The gallbladder is moderately distended. There is mucosal hyperenhancement within the gallbladder and also moderate pericholecystic edema. The findings are consistent with acute cholecystitis. There is no bile duct dilatation. A few small hepatic cysts are noted. There are no solid liver masses. The spleen and pancreas and adrenal glands are unremarkable. There are a few small nonobstructing calculi in the right kidney. There are also multiple areas of cortical thinning in the right kidney consistent with scarring. There is no hydronephrosis. There is a small amount of ascites adjacent to the liver and also in the pelvis. The uterus is absent. A small posteriorly layering left pleural effusion is present.      Extremely large unilocular cystic mass filling the abdomen that is most likely ovarian in origin as described more detail above. Cholelithiasis and evidence of acute cholecystitis as described. Minimal ascites. Small left pleural effusion.  This report was finalized on 11/30/2018 7:24 PM by Dr. Arturo Chawla M.D.      Xr Chest 1 View    Result Date: 11/30/2018  PORTABLE CHEST 11/30/2018 AT 4:47 PM  CLINICAL HISTORY: Hypotension. CHF.  Compared to the previous chest x-ray dated 8/18/2018.  The lungs are somewhat poorly inflated, but appear free of infiltrates. There are no pleural effusions. Calcified right hilar lymph nodes are noted. The heart is borderline enlarged and unchanged.  IMPRESSIONS: No evidence of acute disease within the chest.  This report was finalized on 11/30/2018 5:03 PM by Dr. Arturo Chawla M.D.      Results for orders placed during the hospital encounter of 04/04/18   Adult Transthoracic Echo Complete W/ Cont if Necessary Per Protocol    Narrative · Moderate-to-severe mitral valve regurgitation is present  · Mild to moderate tricuspid valve  regurgitation is present.  · Left atrial cavity size is mildly dilated.  · Calculated EF = 50%.  · There is no evidence of pericardial effusion.          Have reviewed the CT abdomen pelvis    Active Hospital Problems    Diagnosis Date Noted   • Septic shock (CMS/[facility]) [A41.9, R65.21] 11/30/2018      Resolved Hospital Problems   No resolved problems to display.         Assessment/Plan     1. Acute cholecystitis with choledocholithiasis  2. Septic shock requiring vasopressor therapy.  3. Large cystic mass engulfing most of the pelvis and abdomen suspect GYN origin.  4. Urinary obstruction secondary to mass  5. Coronary artery disease severe  6. Valvular heart disease moderate to severe mitral regurgitation  7. Dementia  8. Hypokalemia  9. Elevated liver function tests secondary to #1  10.  anemia  11. Acute kidney injury improved    Patient actually looks pretty good her sister would like to be able take her home but just doesn't think she's going to be able to handle her.  It also promised her that they would never put her in a nursing home.  I don't know how quickly she is going to decline she's not taking much in orally which doesn't surprise me and I suspect that after the antibiotics been off several days she will start deteriorating clinically again but this is poorly speculation.  The family is going to talk with [facility] the palliative care team and try and work on some plans tomorrow    Plan for disposition:    Anshu Alanis MD  12/02/18  5:50 PM    Time: I have spent the over 48 minutes on the critical care time with the patient today

## 2018-12-02 NOTE — PROGRESS NOTES
Continued Stay Note  [facility]     Patient Name: Marie Delgado  MRN: [id number]  Today's Date: 12/2/2018    Admit Date: 11/30/2018    Discharge Plan     Row Name 12/02/18 1100       Plan    Plan Comments  Incoming call from Helene with [facility] at [phone] stating patient is not HSB appropriate and will continue to follow patient and will plan to see when discharged..... Sonal Briggs RN,CCP         Discharge Codes    No documentation.             Sonal Briggs RN

## 2018-12-02 NOTE — PLAN OF CARE
Problem: Fall Risk (Adult)  Goal: Identify Related Risk Factors and Signs and Symptoms  Outcome: Ongoing (interventions implemented as appropriate)   12/02/18 0420   Fall Risk (Adult)   Related Risk Factors (Fall Risk) age-related changes;bladder function altered;gait/mobility problems   Signs and Symptoms (Fall Risk) presence of risk factors     Goal: Absence of Fall  Outcome: Ongoing (interventions implemented as appropriate)      Problem: Skin Injury Risk (Adult)  Goal: Identify Related Risk Factors and Signs and Symptoms  Outcome: Outcome(s) achieved Date Met: 12/02/18 12/02/18 0420   Skin Injury Risk (Adult)   Related Risk Factors (Skin Injury Risk) advanced age;body weight extremes;cognitive impairment;fluid intake inadequate;infection     Goal: Skin Health and Integrity  Outcome: Ongoing (interventions implemented as appropriate)      Problem: Patient Care Overview  Goal: Plan of Care Review  Outcome: Ongoing (interventions implemented as appropriate)   12/02/18 0420   Coping/Psychosocial   Plan of Care Reviewed With family   Plan of Care Review   Progress no change   OTHER   Outcome Summary No complaints of anxiety or pain. Appears comfortable. Family at bedside. Will monitor for needs       Problem: Palliative Care (Adult)  Goal: Maximized Comfort  Outcome: Ongoing (interventions implemented as appropriate)    Goal: Enhanced Quality of Life  Outcome: Ongoing (interventions implemented as appropriate)

## 2018-12-02 NOTE — PLAN OF CARE
Problem: Palliative Care (Adult)  Goal: Identify Related Risk Factors and Signs and Symptoms  Outcome: Outcome(s) achieved Date Met: 12/01/18 12/01/18 1900   Palliative Care (Adult)   Palliative Care: Related Risk Factors terminal illness   Palliative Care: Signs and Symptoms confusion     Goal: Maximized Comfort  Outcome: Ongoing (interventions implemented as appropriate)    Goal: Enhanced Quality of Life  Outcome: Ongoing (interventions implemented as appropriate)

## 2018-12-02 NOTE — CONSULTS
[facility] referral and admissions nurse Helene BARROS arrived to  and met with pt’s nurse Cassi to obtain report.  MD order in [facility] for [facility] in-patient consult verified.  [facility] nurse then met with the pt’s family (nieces Shakila Stewart & Palak Story, sister Vida Phillips, nephew Partha Phillips) to provide an explanation of [facility] () services and discuss pt goals of care.  Per the family they wish to focus on the pt’s comfort and do not wish to pursue extraordinary life prolonging measures.  [facility] nurse met with pt and nursing assessment done.  Pt family vu of and are agreeable to HH services.  [facility] on-call Dr. Maria G Velazquez called to review pt health history, disease progression, and goals of care.   Per Dr. Velazquez, the pt is medically appropriate for hospice and HH services but does not meet the criteria for a general in-patient (GIP)/scattered-bed (SB) admission at this time.  HH to continue to follow along in the pt’s hospital course and DC plans.  [facility]ason to f/u with the pt on Monday 12/3/18 to reassess DC plans and/or appropriateness for GIP/SB admission (as the pt may decline quickly).  The above was communicated to weekend AD Dominguez and pt staff nurse Sobeida.  Thank you for the referral and please call us with any questions or needs at [phone].  [facility] pt ID#: [id number].    Helene Gray RN, BSN    Referral and Admissions Nurse Coordinator

## 2018-12-03 NOTE — PLAN OF CARE
Problem: Fall Risk (Adult)  Goal: Absence of Fall  Outcome: Ongoing (interventions implemented as appropriate)      Problem: Skin Injury Risk (Adult)  Goal: Skin Health and Integrity  Outcome: Ongoing (interventions implemented as appropriate)      Problem: Patient Care Overview  Goal: Plan of Care Review  Outcome: Ongoing (interventions implemented as appropriate)   12/02/18 2224 12/03/18 0552   Coping/Psychosocial   Plan of Care Reviewed With patient;sibling --    Plan of Care Review   Progress --  no change   OTHER   Outcome Summary --  Pt rested well with family at bedside. Pt denies pain or comfort meds. Continue comfort care.      Goal: Individualization and Mutuality  Outcome: Ongoing (interventions implemented as appropriate)    Goal: Discharge Needs Assessment  Outcome: Ongoing (interventions implemented as appropriate)      Problem: Palliative Care (Adult)  Goal: Maximized Comfort  Outcome: Ongoing (interventions implemented as appropriate)    Goal: Enhanced Quality of Life  Outcome: Ongoing (interventions implemented as appropriate)

## 2018-12-03 NOTE — PROGRESS NOTES
Discharge Planning Assessment  [facility]     Patient Name: Marie Delgado  MRN: [id number]  Today's Date: 12/3/2018    Admit Date: 11/30/2018    Discharge Needs Assessment    No documentation.       Discharge Plan     Row Name 12/03/18 1120       Plan    Plan Comments  The patient transferred to [facility] from ICU on 12/1/18 @  14:41. The patient is palliative. [facility] me with the patient and family and states they are appropriate for [facility] at home not a [facility] scattered bed at this time. CCP will follow for any needs that may arise. SALTY Lott RN, CCP.         Destination      No service coordination in this encounter.      Durable Medical Equipment      No service coordination in this encounter.      Dialysis/Infusion      No service coordination in this encounter.      Home Medical Care      No service coordination in this encounter.      Community Resources      No service coordination in this encounter.          Demographic Summary    No documentation.       Functional Status    No documentation.       Psychosocial    No documentation.       Abuse/Neglect    No documentation.       Legal    No documentation.       Substance Abuse    No documentation.       Patient Forms    No documentation.           Radha Lott RN

## 2018-12-03 NOTE — PROGRESS NOTES
Discharge Planning Assessment  [facility]     Patient Name: Marie Delgado  MRN: [id number]  Today's Date: 12/3/2018    Admit Date: 11/30/2018    Discharge Needs Assessment     Row Name 12/03/18 1226       Living Environment    Lives With  other (see comments);sibling(s)    Name(s) of Who Lives With Patient  Ana Phillips/sister [phone] home and cell great nephew/ Mick Cedillo [phone]    Current Living Arrangements  home/apartment/condo    Duration at Residence  one step to get in the home and 6 up and 7 going down bi-level home with basement lived there 20 Years    Potentially Unsafe Housing Conditions  other (see comments) Maltese armstrong and Yorkie pets    Primary Care Provided by  -- sister and great nephew    Provides Primary Care For  no one, unable/limited ability to care for self    Caregiving Concerns  sister states that she can not take care of her at this time.     Family Caregiver if Needed  other (see comments);other relative(s)    Quality of Family Relationships  involved;supportive;helpful    Able to Return to Prior Arrangements  no       Discharge Needs Assessment    Equipment Currently Used at Home  cane, straight;walker, standard    Anticipated Changes Related to Illness  inability to care for self    Equipment Needed After Discharge  -- not sure at this time.    Offered/Gave Vendor List  yes        Discharge Plan     Row Name 12/03/18 1242       Plan    Plan Comments  Spoke with the patient's sister and the patient's great nephew and reviewed all discharge options. At this time the patient is palliative and appropriate for palliative care. Will continue to monitor when [facility] appropriate. SALTY Lott RN, CCP.     Row Name 12/03/18 1121       Plan    Plan Comments  The patient transferred to [facility] from ICU on 12/1/18 @  14:41. The patient is palliative. [facility] me with the patient and family and states they are appropriate for [facility] at home not a [facility] scattered bed at this  time. CCP will follow for any needs that may arise. SALTY Lott RN, CCP.         Destination      No service coordination in this encounter.      Durable Medical Equipment      No service coordination in this encounter.      Dialysis/Infusion      No service coordination in this encounter.      Home Medical Care      No service coordination in this encounter.      Community Resources      No service coordination in this encounter.          Demographic Summary     Row Name 12/03/18 1223       General Information    Admission Type  inpatient    Arrived From  home lives with sister and great nephew    Referral Source  family;admission list;physician;patient    Reason for Consult  decision making;palliative care;end of life/hospice    Preferred Language  English       Contact Information    Permission Granted to Share Info With      Contact Information Obtained for          Functional Status     Row Name 12/03/18 1240       Functional Status, IADL    Medications  completely dependent    Meal Preparation  completely dependent    Housekeeping  completely dependent    Laundry  completely dependent    Shopping  completely dependent    IADL Comments  The patient's sister does not drive. The Great nephew provides the transportation to grocery store and MD's office       Mental Status    General Appearance WDL  WDL       Employment/    Employment Status  other (see comments) retired from [employer]    Current or Previous Occupation  desk job        Psychosocial    No documentation.       Abuse/Neglect    No documentation.       Legal    No documentation.       Substance Abuse    No documentation.       Patient Forms    No documentation.           Radha Lott RN

## 2018-12-03 NOTE — CONSULTS
Patient transferred to the palliative care unit following goals of care and treatment preferences discussion with Dr. Alanis.  Patient and/or family state goal of care to be comfort  and treatment preferences to be geared toward symptom management for comfort only.  The palliative care team will continue to follow for any further needs.

## 2018-12-03 NOTE — PLAN OF CARE
Problem: Fall Risk (Adult)  Goal: Absence of Fall  Outcome: Ongoing (interventions implemented as appropriate)      Problem: Skin Injury Risk (Adult)  Goal: Skin Health and Integrity  Outcome: Ongoing (interventions implemented as appropriate)      Problem: Patient Care Overview  Goal: Plan of Care Review  Outcome: Ongoing (interventions implemented as appropriate)   12/02/18 1800   Coping/Psychosocial   Plan of Care Reviewed With patient;sibling;family   Plan of Care Review   Progress declining   OTHER   Outcome Summary Pt had some complaints of trouble breathing. Pt was given morphine and was much better afterward. Morphine given x 1 dose today. Pt has been comfortable and sleeping between care. Family at bedside. Will continue with comfort care and monitoring.   Coping/Psychosocial   Patient Agreement with Plan of Care agrees     Goal: Individualization and Mutuality  Outcome: Ongoing (interventions implemented as appropriate)      Problem: Palliative Care (Adult)  Goal: Maximized Comfort  Outcome: Ongoing (interventions implemented as appropriate)    Goal: Enhanced Quality of Life  Outcome: Ongoing (interventions implemented as appropriate)

## 2018-12-04 NOTE — PLAN OF CARE
Problem: Fall Risk (Adult)  Goal: Absence of Fall  Outcome: Ongoing (interventions implemented as appropriate)      Problem: Skin Injury Risk (Adult)  Goal: Skin Health and Integrity  Outcome: Ongoing (interventions implemented as appropriate)      Problem: Patient Care Overview  Goal: Plan of Care Review  Outcome: Ongoing (interventions implemented as appropriate)   12/03/18 1835   Coping/Psychosocial   Plan of Care Reviewed With patient;sibling;family   Plan of Care Review   Progress declining   OTHER   Outcome Summary Pt rested between care today. Was given pain medication x 2 doses today for SOA vs pain. Assisted with pt repositioning and pt slept between care. Pt denies pain. Will continue with comfort care and monitoring   Coping/Psychosocial   Patient Agreement with Plan of Care agrees   Coping/Psychosocial   Consent Given to Review Plan with sister, nephew       Problem: Patient Care Overview  Goal: Plan of Care Review  Outcome: Ongoing (interventions implemented as appropriate)   12/03/18 1835   Coping/Psychosocial   Plan of Care Reviewed With patient;sibling;family   Plan of Care Review   Progress declining   OTHER   Outcome Summary Pt rested between care today. Was given pain medication x 2 doses today for SOA vs pain. Assisted with pt repositioning and pt slept between care. Pt denies pain. Will continue with comfort care and monitoring       Problem: Palliative Care (Adult)  Goal: Maximized Comfort  Outcome: Ongoing (interventions implemented as appropriate)    Goal: Enhanced Quality of Life  Outcome: Ongoing (interventions implemented as appropriate)

## 2018-12-04 NOTE — PROGRESS NOTES
LOS: 3 days   Patient Care Team:  Queenie Alas PA-C as PCP - General  Queenie Alas PA-C as PCP - Family Medicine  Elias Noyola MD as Consulting Physician (Cardiology)    Subjective     Patient is confused she is pleasant she wants to go home.  Her sister who is her healthcare surrogate and next of kin and caregiverat bedside.  For sister she did drink a little bit better today not a whole lot of food intake.    Review of Systems:   Unable to obtain patient confused      Objective     Vital Signs  Vital Sign Min/Max for last 24 hours  Temp  Min: 97.1 °F (36.2 °C)  Max: 97.2 °F (36.2 °C)   BP  Min: 92/62  Max: 99/59   Pulse  Min: 85  Max: 91   Resp  Min: 16  Max: 18   SpO2  Min: 92 %  Max: 93 %   No Data Recorded   No Data Recorded        Ventilator/Non-Invasive Ventilation Settings (From admission, onward)    None                       Body mass index is 23.08 kg/m².  I/O last 3 completed shifts:  In: 799 [I.V.:799]  Out: 825 [Urine:825]  No intake/output data recorded.        Physical Exam:  General Appearance: Elderly white female resting in bed she really doesn't appear in acute distress   Eyes: Conjunctiva are clear and anicteric  ENT: Mucous membranes are dry no exudates  Neck: No JVD trachea midline no palpable adenopathy or thyromegaly  Lungs: Clear I don't hear any wheezes rales or rhonchi symmetric chest expansion she is not labored  Cardiac: Regular rhythm she is a little tachycardic no murmur  Abdomen: Is distended but soft knowing there is a mass that are still really can't palpate any margins of the mass.  She doesn't have any real right upper quadrant tenderness  : Turner catheter to dependent drainage  Musculoskeletal: Grossly normal  Skin: No jaundice, no petechiae no rashes noted  Neuro: She is pleasantly confused she is following commands moving her extremities  Extremities/P Vascular: No clubbing no cyanosis no edema palpable radial dorsalis pedis pulses  MSE: Not saying a  whole lot today still asking to go home however       Labs:  Results from last 7 days   Lab Units  12/01/18   0539  11/30/18   1558   GLUCOSE mg/dL  87  81   SODIUM mmol/L  142  143   POTASSIUM mmol/L  2.2*  2.3*   MAGNESIUM mg/dL   --   1.9   CO2 mmol/L  25.3  28.9   CHLORIDE mmol/L  101  98   ANION GAP mmol/L  15.7  16.1   CREATININE mg/dL  0.94  1.19*   BUN mg/dL  20  20   BUN / CREAT RATIO   21.3  16.8   CALCIUM mg/dL  8.4*  9.4   EGFR IF NONAFRICN AM mL/min/1.73  56*  43*   ALK PHOS U/L  153*  213*   TOTAL PROTEIN g/dL  5.7*  7.3   ALT (SGPT) U/L  41*  59*   AST (SGOT) U/L  33*  50*   BILIRUBIN mg/dL  1.1  1.4*   ALBUMIN g/dL  2.20*  3.20*   GLOBULIN gm/dL  3.5  4.1     Estimated Creatinine Clearance: 39.1 mL/min (by C-G formula based on SCr of 0.94 mg/dL).      Results from last 7 days   Lab Units  12/01/18   0539  11/30/18   1558   WBC 10*3/mm3  14.17*  11.71*   RBC 10*6/mm3  3.45*  3.95   HEMOGLOBIN g/dL  10.5*  12.4   HEMATOCRIT %  33.4*  38.3   MCV fL  96.8  97.0   MCH pg  30.4  31.4   MCHC g/dL  31.4*  32.4   RDW %  13.9*  13.9*   RDW-SD fl  49.3  49.9   MPV fL  10.9  11.1   PLATELETS 10*3/mm3  105*  107*   NEUTROPHIL % %  78.9*  80.1*   LYMPHOCYTE % %  9.2*  9.9*   MONOCYTES % %  11.1  9.2   EOSINOPHIL % %  0.1*  0.1*   BASOPHIL % %  0.1  0.0   IMM GRAN % %  0.6*  0.7*   NEUTROS ABS 10*3/mm3  11.18*  9.38*   LYMPHS ABS 10*3/mm3  1.31  1.16   MONOS ABS 10*3/mm3  1.57*  1.08   EOS ABS 10*3/mm3  0.01  0.01   BASOS ABS 10*3/mm3  0.01  0.00   IMMATURE GRANS (ABS) 10*3/mm3  0.09*  0.08*         Results from last 7 days   Lab Units  11/30/18   1558   TROPONIN T ng/mL  0.014             Results from last 7 days   Lab Units  11/30/18   2351  11/30/18   1732  11/30/18   1558   LACTATE mmol/L  1.6  3.7*   --    PROCALCITONIN ng/mL   --    --   0.89*         Microbiology Results (last 10 days)     Procedure Component Value - Date/Time    Blood Culture - Blood, Arm, Left [[id number]]  (Abnormal) Collected:  11/30/18  1805    Lab Status:  Final result Specimen:  Blood from Arm, Left Updated:  12/03/18 0734     Blood Culture Corynebacterium species     Comment: Probable contaminant requires clinical correlation, susceptibility not performed unless requested by physician.    No definitive guidelines. All are susceptible to vancomycin. Resistance to penicillins & cephalosporins does occur.        Isolated from Anaerobic Bottle     Gram Stain Anaerobic Bottle Gram positive cocci in clusters     Comment: Gram stain appears to be coccobacilli.        Blood Culture ID, PCR - Blood, Arm, Left [[id number]]  (Normal) Collected:  11/30/18 1805    Lab Status:  Final result Specimen:  Blood from Arm, Left Updated:  12/02/18 0913     BCID, PCR Negative by BCID PCR. Culture to Follow.    Blood Culture - Blood, Arm, Right [[id number]] Collected:  11/30/18 1732    Lab Status:  Preliminary result Specimen:  Blood from Arm, Right Updated:  12/03/18 1800     Blood Culture No growth at 3 days                Divalproex Sodium 250 mg Oral BID   donepezil 5 mg Oral Nightly   [START ON 12/4/2018] influenza vaccine 0.5 mL Intramuscular Once   potassium chloride 10 mEq Intravenous Once          Diagnostics:  Ct Head Without Contrast    Result Date: 11/30/2018  CT HEAD WITHOUT CONTRAST  HISTORY: Fall, on blood thinners.  A noncontrasted CT examination of the brain was performed.  FINDINGS: The brain and ventricles are symmetrical. There is no evidence of intracranial hemorrhage, hydrocephalus or of abnormal extra-axial fluid. No focal area of decreased attenuation to suggest acute infarction is identified. Moderate vascular calcification is noted. Bone windows showed no evidence of a calvarial fracture.      No evidence of fracture or hemorrhage. Age-appropriate atrophy and vascular calcification noted similar to 11/11/2018. If indicated, further evaluation could be performed with a MRI examination of the brain.    Radiation dose reduction techniques were  utilized, including automated exposure control and exposure modulation based on body size.  This report was finalized on 11/30/2018 6:38 PM by Dr. Dev Power M.D.      Ct Head Without Contrast    Result Date: 11/11/2018  CT HEAD WO CONTRAST-  INDICATIONS: Hallucinations  TECHNIQUE: Radiation dose reduction techniques were utilized, including automated exposure control and exposure modulation based on body size.    Noncontrast head CT  COMPARISON: None available  FINDINGS:       No acute intracranial hemorrhage, midline shift or mass effect. No acute territorial infarct is identified. Basal ganglia mineralization is seen.  Mild periventricular hypodensities suggest chronic small vessel ischemic change in a patient this age.  Arterial calcifications are seen in the base of the brain.  Ventricles, cisterns, cerebral sulci are unremarkable for patient age.  The visualized paranasal sinuses, orbits, mastoid air cells are unremarkable.               No acute intracranial hemorrhage or hydrocephalus. Chronic appearing changes. If there is further clinical concern, MRI could be considered for further evaluation.  This report was finalized on 11/11/2018 2:03 PM by Dr. Norberto Estrada M.D.      Ct Abdomen Pelvis With Contrast    Result Date: 11/30/2018  CT ABDOMEN PELVIS W CONTRAST-  CLINICAL HISTORY: Right upper quadrant. Dementia.  TECHNIQUE: Spiral CT images were acquired through the abdomen and pelvis with no oral or IV contrast and were reconstructed in 3 mm thick slices.  Radiation dose reduction techniques were utilized, including automated exposure control and exposure modulation based on body size.  COMPARISON: None  FINDINGS: The striking finding on this exam is extremely large unilocular cystic mass essentially filling the entire abdomen and portions of the pelvis. The mass measures 24.8 cm in greatest cephalocaudad dimension and 13.6 cm in greatest AP dimension and 26.2 cm in greatest mediolateral  dimension. This is most likely ovarian in origin. The mass superiorly displaces all of the abdominal organs and small bowel. Inferiorly, it compresses the dome of the urinary bladder. The bladder is markedly distended with urine. Markedly compresses the third portion of the duodenum. However, the stomach and proximal duodenum are not pathologically distended. There is no definite evidence of obstruction. No enhancing mural nodules or septations are present within the mass. There are multiple gallstones in the neck of the gallbladder. The gallbladder is moderately distended. There is mucosal hyperenhancement within the gallbladder and also moderate pericholecystic edema. The findings are consistent with acute cholecystitis. There is no bile duct dilatation. A few small hepatic cysts are noted. There are no solid liver masses. The spleen and pancreas and adrenal glands are unremarkable. There are a few small nonobstructing calculi in the right kidney. There are also multiple areas of cortical thinning in the right kidney consistent with scarring. There is no hydronephrosis. There is a small amount of ascites adjacent to the liver and also in the pelvis. The uterus is absent. A small posteriorly layering left pleural effusion is present.      Extremely large unilocular cystic mass filling the abdomen that is most likely ovarian in origin as described more detail above. Cholelithiasis and evidence of acute cholecystitis as described. Minimal ascites. Small left pleural effusion.  This report was finalized on 11/30/2018 7:24 PM by Dr. Arturo Chawla M.D.      Xr Chest 1 View    Result Date: 11/30/2018  PORTABLE CHEST 11/30/2018 AT 4:47 PM  CLINICAL HISTORY: Hypotension. CHF.  Compared to the previous chest x-ray dated 8/18/2018.  The lungs are somewhat poorly inflated, but appear free of infiltrates. There are no pleural effusions. Calcified right hilar lymph nodes are noted. The heart is borderline enlarged and  unchanged.  IMPRESSIONS: No evidence of acute disease within the chest.  This report was finalized on 11/30/2018 5:03 PM by Dr. Arturo Chawla M.D.      Results for orders placed during the hospital encounter of 04/04/18   Adult Transthoracic Echo Complete W/ Cont if Necessary Per Protocol    Narrative · Moderate-to-severe mitral valve regurgitation is present  · Mild to moderate tricuspid valve regurgitation is present.  · Left atrial cavity size is mildly dilated.  · Calculated EF = 50%.  · There is no evidence of pericardial effusion.          Have reviewed the CT abdomen pelvis    Active Hospital Problems    Diagnosis Date Noted   • Septic shock (CMS/[facility]) [A41.9, R65.21] 11/30/2018      Resolved Hospital Problems   No resolved problems to display.         Assessment/Plan     1. Acute cholecystitis with choledocholithiasis  2. Septic shock requiring vasopressor therapy.  3. Large cystic mass engulfing most of the pelvis and abdomen suspect GYN origin.  4. Urinary obstruction secondary to mass  5. Coronary artery disease severe  6. Valvular heart disease moderate to severe mitral regurgitation  7. Dementia  8. Hypokalemia  9. Elevated liver function tests secondary to #1  10.  anemia  11. Acute kidney injury improved    Patient actually looks pretty good her sister would like to be able take her home but just doesn't think she's going to be able to handle her.  It also promised her that they would never put her in a nursing home.  I don't know how quickly she is going to decline she's not taking much in orally which doesn't surprise me and I suspect that after the antibiotics been off several days she will start deteriorating clinically again but this is purely speculation.  Her sister sort of waiting to see what happens over the next couple of days and trying to review her options.    Plan for disposition:    Anshu Alanis MD  12/03/18  8:11 PM    Time:

## 2018-12-04 NOTE — PLAN OF CARE
Problem: Patient Care Overview  Goal: Plan of Care Review  Continue symptom management and comfort care   12/03/18 1835 12/03/18 2200 12/04/18 0358   Coping/Psychosocial   Plan of Care Reviewed With --  patient;sibling --    Plan of Care Review   Progress declining --  --    OTHER   Outcome Summary --  --  Patient rested well overnight. No complaints of pain or anxiety. Sister at bedside. Will continue to monitor.

## 2018-12-05 NOTE — PROGRESS NOTES
Case Management Discharge Note    Final Note: admitted to a [facility] scattered bed on 12/5/18. SALTY Lott RN, CCCP    Destination - Selection Complete      Service Provider Request Status Selected Services Address Phone Number Fax Number    [facility] [id number] [id number] [phone]      Durable Medical Equipment      No service has been selected for the patient.      Dialysis/Infusion      No service has been selected for the patient.      Home Medical Care      No service has been selected for the patient.      Community Resources      No service has been selected for the patient.             Final Discharge Disposition Code: 51 - hospice medical facility

## 2018-12-05 NOTE — PLAN OF CARE
Problem: Palliative Care (Adult)  Goal: Maximized Comfort  Outcome: Ongoing (interventions implemented as appropriate)    Goal: Enhanced Quality of Life  Outcome: Ongoing (interventions implemented as appropriate)      Problem: Skin Injury Risk (Adult)  Goal: Skin Health and Integrity  Outcome: Ongoing (interventions implemented as appropriate)      Problem: Patient Care Overview  Goal: Plan of Care Review  Outcome: Ongoing (interventions implemented as appropriate)   12/05/18 5905   Coping/Psychosocial   Plan of Care Reviewed With patient;family;sibling   Plan of Care Review   Progress declining   OTHER   Outcome Summary Patient has been alert and oriented x4 today. She ate 2 cups of tomato soup, jello, and a few other foods today. She was able to call out at times or voice to her sister that she was short of breath and ask for medication. As the day has progressed her shortness of breath has increased, I increased her to 6mg of morphine and 0.5 ativan due to the patient still complaining of SOA after medicating with 4mg of morphine. Patient has a small opening on her bottom and the dressing was changed today after her bath. I recommeneded to the family to destimulate the room and turn of the lights. Currently the patient is resting comfortably after adminstering morphine and ativan. Will continue to monitor.     Goal: Individualization and Mutuality  Outcome: Ongoing (interventions implemented as appropriate)

## 2018-12-05 NOTE — CONSULTS
Spiritual Care: Patient sleeping; the patient is [employer] and was visited by Father Sushant from [employer] [facility].  I visited with the family who all were doing well and felt that their spiritual needs were being met. I let them know what my role was during this initial visit. carissa Rebollar

## 2018-12-05 NOTE — DISCHARGE SUMMARY
Date of Discharge:  12/5/2018    Discharge Diagnoses:  1.  Acute cholecystitis with choledocholithiasis  2. Septic shock  3. Large cystic mass engulfing most of the pelvis and abdomen likely GYN in origin line urinary obstruction secondary to pelvic mass  4. Coronary disease severe  5. Moderate to severe mitral regurgitation  6. Advanced dementia  7. Hypokalemia  8. Elevated liver function test secondary #1  9. Anemia  10. Acute kidney injury      Hospital Course  Patient is a 89 y.o. female presented with evidence of sepsis and septic shock she was found to have acute cholecystitis as part of that workup she had a CT scan of her abdomen which revealed this huge cystic mass in her abdomen 27 cm looks like originating in her pelvic region suspected GYN origin with this she had some urinary obstruction.  Patient has underlying dementia and severe coronary artery disease and valvular heart disease we discussed the family says previously cardiology told her she was not a candidate for surgical intervention.  We talked about surgery for her gallbladder was probably would require also intervention regarding this mass and the family particularly her sister is her next of kin and healthcare surrogate is her caregiver 7 the patient would never want this and they decided to go with palliative care.  They are now going to transition to hospice scattered bed.      Procedures Performed         Consults:   Consults     Date and Time Order Name Status Description    11/30/2018 1931 Surgery (on-call MD unless specified) Completed     11/30/2018 1931 Pulmonology (on-call MD unless specified)            Pertinent Test Results:   Labs:  Results from last 7 days   Lab Units  12/01/18   0539  11/30/18   1558   GLUCOSE mg/dL  87  81   SODIUM mmol/L  142  143   POTASSIUM mmol/L  2.2*  2.3*   MAGNESIUM mg/dL   --   1.9   CO2 mmol/L  25.3  28.9   CHLORIDE mmol/L  101  98   ANION GAP mmol/L  15.7  16.1   CREATININE mg/dL  0.94  1.19*    BUN mg/dL  20  20   BUN / CREAT RATIO   21.3  16.8   CALCIUM mg/dL  8.4*  9.4   EGFR IF NONAFRICN AM mL/min/1.73  56*  43*   ALK PHOS U/L  153*  213*   TOTAL PROTEIN g/dL  5.7*  7.3   ALT (SGPT) U/L  41*  59*   AST (SGOT) U/L  33*  50*   BILIRUBIN mg/dL  1.1  1.4*   ALBUMIN g/dL  2.20*  3.20*   GLOBULIN gm/dL  3.5  4.1     Estimated Creatinine Clearance: 39.1 mL/min (by C-G formula based on SCr of 0.94 mg/dL).      Results from last 7 days   Lab Units  12/01/18   0539  11/30/18   1558   WBC 10*3/mm3  14.17*  11.71*   RBC 10*6/mm3  3.45*  3.95   HEMOGLOBIN g/dL  10.5*  12.4   HEMATOCRIT %  33.4*  38.3   MCV fL  96.8  97.0   MCH pg  30.4  31.4   MCHC g/dL  31.4*  32.4   RDW %  13.9*  13.9*   RDW-SD fl  49.3  49.9   MPV fL  10.9  11.1   PLATELETS 10*3/mm3  105*  107*   NEUTROPHIL % %  78.9*  80.1*   LYMPHOCYTE % %  9.2*  9.9*   MONOCYTES % %  11.1  9.2   EOSINOPHIL % %  0.1*  0.1*   BASOPHIL % %  0.1  0.0   IMM GRAN % %  0.6*  0.7*   NEUTROS ABS 10*3/mm3  11.18*  9.38*   LYMPHS ABS 10*3/mm3  1.31  1.16   MONOS ABS 10*3/mm3  1.57*  1.08   EOS ABS 10*3/mm3  0.01  0.01   BASOS ABS 10*3/mm3  0.01  0.00   IMMATURE GRANS (ABS) 10*3/mm3  0.09*  0.08*         Results from last 7 days   Lab Units  11/30/18   1558   TROPONIN T ng/mL  0.014             Results from last 7 days   Lab Units  11/30/18   2351  11/30/18   1732  11/30/18   1558   LACTATE mmol/L  1.6  3.7*   --    PROCALCITONIN ng/mL   --    --   0.89*           Imaging Results (last 72 hours)     ** No results found for the last 72 hours. **        Results for orders placed during the hospital encounter of 04/04/18   Adult Transthoracic Echo Complete W/ Cont if Necessary Per Protocol    Narrative · Moderate-to-severe mitral valve regurgitation is present  · Mild to moderate tricuspid valve regurgitation is present.  · Left atrial cavity size is mildly dilated.  · Calculated EF = 50%.  · There is no evidence of pericardial effusion.              Condition on  Discharge:  Very poor    Vital Signs  Temp:  [97 °F (36.1 °C)-97.6 °F (36.4 °C)] 97 °F (36.1 °C)  Heart Rate:  [] 108  Resp:  [16] 16  BP: ()/(67-73) 115/73    Physical Exam:    General Appearance: Elderly white female resting in bed she really doesn't appear in acute distress   Eyes: Conjunctiva are clear and anicteric  ENT: Mucous membranes are dry no exudates  Neck: No JVD trachea midline no palpable adenopathy or thyromegaly  Lungs: Clear I don't hear any wheezes rales or rhonchi symmetric chest expansion she is not labored  Cardiac: Regular rhythm she is a little tachycardic no murmur  Abdomen: Is distended but soft knowing there is a mass that are still really can't palpate any margins of the mass.  She doesn't have any real right upper quadrant tenderness  : Turner catheter to dependent drainage  Musculoskeletal: Grossly normal  Skin: No jaundice, no petechiae no rashes noted  Neuro: She is pleasantly confused she is following commands moving her extremities  Extremities/P Vascular: No clubbing no cyanosis no edema palpable radial dorsalis pedis pulses  MSE: Not saying a whole lot today           Discharge Disposition  Hospice/Medical Facility ([facility])    Discharge Medications     Discharge Medications      ASK your doctor about these medications      Instructions Start Date   amLODIPine 2.5 MG tablet  Commonly known as:  NORVASC   2.5 mg, Oral, Daily      apixaban 5 MG tablet tablet  Commonly known as:  ELIQUIS   5 mg, Oral, 2 Times Daily      atorvastatin 20 MG tablet  Commonly known as:  LIPITOR   20 mg, Oral, Daily, For cholesterol      Divalproex Sodium 125 MG capsule  Commonly known as:  DEPAKOTE SPRINKLE   250 mg, Oral, 2 Times Daily      donepezil 5 MG tablet  Commonly known as:  ARICEPT   5 mg, Oral, Nightly      furosemide 20 MG tablet  Commonly known as:  LASIX   20 mg, Oral, Daily      nitroglycerin 0.4 MG SL tablet  Commonly known as:  NITROSTAT   0.4 mg, Sublingual,  Every 5 Minutes PRN, Take no more than 3 doses in 15 minutes.      OLANZapine 2.5 MG tablet  Commonly known as:  zyPREXA   2.5 mg, Oral, Daily      omeprazole 20 MG capsule  Commonly known as:  priLOSEC   20 mg, Oral, Daily      potassium chloride 20 MEQ/15ML (10%) solution  Commonly known as:  KAYCIEL   10 mEq, Oral, Daily      propranolol 20 MG tablet  Commonly known as:  INDERAL   20 mg, Oral, 2 Times Daily      vitamin D3 5000 units tablet tablet   5,000 Units, Oral, Daily             Discharge Diet:  regular    Activity at Discharge:     Follow-up Appointments  Future Appointments   Date Time Provider Department Center   12/17/2018 10:30 AM Elias Noyola MD [facility] None         Test Results Pending at Discharge   Order Current Status    Blood Culture - Blood, Arm, Right Preliminary result           Anshu Alanis MD  12/05/18  3:46 PM    Time:

## 2018-12-05 NOTE — PLAN OF CARE
Problem: Fall Risk (Adult)  Goal: Identify Related Risk Factors and Signs and Symptoms  Outcome: Outcome(s) achieved Date Met: 12/04/18    Goal: Absence of Fall  Outcome: Ongoing (interventions implemented as appropriate)      Problem: Skin Injury Risk (Adult)  Goal: Skin Health and Integrity  Outcome: Ongoing (interventions implemented as appropriate)      Problem: Patient Care Overview  Goal: Plan of Care Review  Outcome: Ongoing (interventions implemented as appropriate)   12/04/18 1996   Coping/Psychosocial   Plan of Care Reviewed With patient;family   Plan of Care Review   Progress no change   OTHER   Outcome Summary Patient medicated with morphine for shortness of air, with dosing increased for comfort. Patient otherwise appears comfortable at rest. Family member at bedside and very concerned about how she'd be able to take care of the patient at home. Will continue to monitor per palliative goals of care.      Goal: Individualization and Mutuality  Outcome: Ongoing (interventions implemented as appropriate)    Goal: Discharge Needs Assessment  Outcome: Ongoing (interventions implemented as appropriate)    Goal: Interprofessional Rounds/Family Conf  Outcome: Ongoing (interventions implemented as appropriate)      Problem: Palliative Care (Adult)  Goal: Maximized Comfort  Outcome: Ongoing (interventions implemented as appropriate)    Goal: Enhanced Quality of Life  Outcome: Ongoing (interventions implemented as appropriate)

## 2018-12-05 NOTE — PLAN OF CARE
Problem: Fall Risk (Adult)  Goal: Absence of Fall  Outcome: Ongoing (interventions implemented as appropriate)      Problem: Skin Injury Risk (Adult)  Goal: Skin Health and Integrity  Outcome: Ongoing (interventions implemented as appropriate)      Problem: Patient Care Overview  Goal: Plan of Care Review  Outcome: Ongoing (interventions implemented as appropriate)   12/05/18 0455   Coping/Psychosocial   Plan of Care Reviewed With patient   Plan of Care Review   Progress no change   OTHER   Outcome Summary Appears comfortable with no signs of anxiety or soa. Sister at BS. Will continue palliative measures       Problem: Palliative Care (Adult)  Goal: Maximized Comfort  Outcome: Ongoing (interventions implemented as appropriate)    Goal: Enhanced Quality of Life  Outcome: Ongoing (interventions implemented as appropriate)

## 2018-12-05 NOTE — H&P
Patient Care Team:  Queenie Alas PA-C as PCP - General  Queenie Alas PA-C as PCP - Family Medicine  Elias Noyola MD as Consulting Physician (Cardiology)      Subjective     Patient is a 89 y.o. female.  And suffering from acute care and palliative care to hospice scattered bed      Review of Systems:        History  Past Medical History:   Diagnosis Date   • Arthritis    • Basal cell adenocarcinoma     on her nose   • CHF (congestive heart failure) (CMS/HCC)    • Dementia    • Hyperlipidemia    • Hypertension    • Onychomycosis    • Vitamin D deficiency disease      Past Surgical History:   Procedure Laterality Date   • APPENDECTOMY     • CATARACT EXTRACTION     • CORNEAL TRANSPLANT     • HEMORROIDECTOMY     • TONSILLECTOMY       Social History     Socioeconomic History   • Marital status: Single     Spouse name: Not on file   • Number of children: Not on file   • Years of education: Not on file   • Highest education level: Not on file   Tobacco Use   • Smoking status: Never Smoker   • Smokeless tobacco: Never Used   Substance and Sexual Activity   • Alcohol use: No   • Drug use: Defer   • Sexual activity: Defer     Family History   Problem Relation Age of Onset   • Cancer Sister    • Diabetes Maternal Grandmother    • Diabetes Paternal Grandfather          Allergies:  Codeine    Medications:  Prior to Admission medications    Medication Sig Start Date End Date Taking? Authorizing Provider   amLODIPine (NORVASC) 2.5 MG tablet Take 2.5 mg by mouth Daily.   Yes ProviderChar MD   apixaban (ELIQUIS) 5 MG tablet tablet Take 5 mg by mouth 2 (Two) Times a Day.   Yes ProviderChar MD   atorvastatin (LIPITOR) 20 MG tablet Take 1 tablet by mouth Daily. For cholesterol 7/16/18  Yes Elias Noyola MD   Cholecalciferol (VITAMIN D3) 5000 UNITS tablet Take 5,000 Units by mouth daily.   Yes ProviderChar MD   Divalproex Sodium (DEPAKOTE SPRINKLE) 125 MG capsule Take 2  "capsules by mouth 2 (Two) Times a Day. 11/21/18  Yes Garland Maradiaga III, MD   donepezil (ARICEPT) 5 MG tablet Take 1 tablet by mouth Every Night. 11/21/18  Yes Garland Maradiaga III, MD   furosemide (LASIX) 20 MG tablet Take 20 mg by mouth Daily.   Yes ProviderChar MD   nitroglycerin (NITROSTAT) 0.4 MG SL tablet Place 0.4 mg under the tongue Every 5 (Five) Minutes As Needed for Chest Pain. Take no more than 3 doses in 15 minutes.   Yes ProviderChar MD   omeprazole (priLOSEC) 20 MG capsule Take 20 mg by mouth Daily.   Yes ProviderChar MD   propranolol (INDERAL) 20 MG tablet Take 20 mg by mouth 2 (Two) Times a Day.   Yes Char Gregory MD   OLANZapine (zyPREXA) 2.5 MG tablet Take 1 tablet by mouth Daily. 11/21/18   Garland Maradiaga III, MD   potassium chloride (KAYCIEL) 20 MEQ/15ML (10%) solution Take 7.5 mL by mouth Daily. 9/17/18   Queenie Alas PA-C       Divalproex Sodium 250 mg Oral BID   donepezil 5 mg Oral Nightly   potassium chloride 10 mEq Intravenous Once          Objective     Vital Signs  Vital Sign Min/Max for last 24 hours  Temp  Min: 97 °F (36.1 °C)  Max: 97.6 °F (36.4 °C)   BP  Min: 85/67  Max: 115/73   Pulse  Min: 96  Max: 108   Resp  Min: 16  Max: 16   SpO2  Min: 87 %  Max: 98 %   No Data Recorded   No Data Recorded       Intake/Output Summary (Last 24 hours) at 12/5/2018 1547  Last data filed at 12/5/2018 0459  Gross per 24 hour   Intake --   Output 200 ml   Net -200 ml     No intake/output data recorded.  Last Weight and Admission Weight        11/30/18 2105   Weight: 61 kg (134 lb 7.7 oz)     Flowsheet Rows      First Filed Value   Admission Height  162.6 cm (64\") Documented at 11/30/2018 1540   Admission Weight  64.9 kg (143 lb) Documented at 11/30/2018 1540          Body mass index is 23.08 kg/m².           Physical Exam:    General Appearance: Elderly white female resting in bed she really doesn't appear in acute distress "   Eyes: Conjunctiva are clear and anicteric  ENT: Mucous membranes are dry no exudates  Neck: No JVD trachea midline no palpable adenopathy or thyromegaly  Lungs: Clear I don't hear any wheezes rales or rhonchi symmetric chest expansion she is not labored  Cardiac: Regular rhythm she is a little tachycardic no murmur  Abdomen: Is distended but soft knowing there is a mass that are still really can't palpate any margins of the mass.  She doesn't have any real right upper quadrant tenderness  : Turner catheter to dependent drainage  Musculoskeletal: Grossly normal  Skin: No jaundice, no petechiae no rashes noted  Neuro: She is pleasantly confused she is following commands moving her extremities  Extremities/P Vascular: No clubbing no cyanosis no edema palpable radial dorsalis pedis pulses  MSE: Not saying a whole lot today         Labs:  Results from last 7 days   Lab Units  12/01/18   0539  11/30/18   1558   GLUCOSE mg/dL  87  81   SODIUM mmol/L  142  143   POTASSIUM mmol/L  2.2*  2.3*   MAGNESIUM mg/dL   --   1.9   CO2 mmol/L  25.3  28.9   CHLORIDE mmol/L  101  98   ANION GAP mmol/L  15.7  16.1   CREATININE mg/dL  0.94  1.19*   BUN mg/dL  20  20   BUN / CREAT RATIO   21.3  16.8   CALCIUM mg/dL  8.4*  9.4   EGFR IF NONAFRICN AM mL/min/1.73  56*  43*   ALK PHOS U/L  153*  213*   TOTAL PROTEIN g/dL  5.7*  7.3   ALT (SGPT) U/L  41*  59*   AST (SGOT) U/L  33*  50*   BILIRUBIN mg/dL  1.1  1.4*   ALBUMIN g/dL  2.20*  3.20*   GLOBULIN gm/dL  3.5  4.1     Estimated Creatinine Clearance: 39.1 mL/min (by C-G formula based on SCr of 0.94 mg/dL).      Results from last 7 days   Lab Units  12/01/18   0539  11/30/18   1558   WBC 10*3/mm3  14.17*  11.71*   RBC 10*6/mm3  3.45*  3.95   HEMOGLOBIN g/dL  10.5*  12.4   HEMATOCRIT %  33.4*  38.3   MCV fL  96.8  97.0   MCH pg  30.4  31.4   MCHC g/dL  31.4*  32.4   RDW %  13.9*  13.9*   RDW-SD fl  49.3  49.9   MPV fL  10.9  11.1   PLATELETS 10*3/mm3  105*  107*   NEUTROPHIL % %  78.9*   80.1*   LYMPHOCYTE % %  9.2*  9.9*   MONOCYTES % %  11.1  9.2   EOSINOPHIL % %  0.1*  0.1*   BASOPHIL % %  0.1  0.0   IMM GRAN % %  0.6*  0.7*   NEUTROS ABS 10*3/mm3  11.18*  9.38*   LYMPHS ABS 10*3/mm3  1.31  1.16   MONOS ABS 10*3/mm3  1.57*  1.08   EOS ABS 10*3/mm3  0.01  0.01   BASOS ABS 10*3/mm3  0.01  0.00   IMMATURE GRANS (ABS) 10*3/mm3  0.09*  0.08*         Results from last 7 days   Lab Units  11/30/18   1558   TROPONIN T ng/mL  0.014             Results from last 7 days   Lab Units  11/30/18   2351  11/30/18   1732  11/30/18   1558   LACTATE mmol/L  1.6  3.7*   --    PROCALCITONIN ng/mL   --    --   0.89*         Microbiology Results (last 10 days)     Procedure Component Value - Date/Time    Blood Culture - Blood, Arm, Left [[id number]]  (Abnormal) Collected:  11/30/18 1805    Lab Status:  Final result Specimen:  Blood from Arm, Left Updated:  12/03/18 0734     Blood Culture Corynebacterium species     Comment: Probable contaminant requires clinical correlation, susceptibility not performed unless requested by physician.    No definitive guidelines. All are susceptible to vancomycin. Resistance to penicillins & cephalosporins does occur.        Isolated from Anaerobic Bottle     Gram Stain Anaerobic Bottle Gram positive cocci in clusters     Comment: Gram stain appears to be coccobacilli.        Blood Culture ID, PCR - Blood, Arm, Left [[id number]]  (Normal) Collected:  11/30/18 1805    Lab Status:  Final result Specimen:  Blood from Arm, Left Updated:  12/02/18 0913     BCID, PCR Negative by BCID PCR. Culture to Follow.    Blood Culture - Blood, Arm, Right [[id number]] Collected:  11/30/18 1732    Lab Status:  Preliminary result Specimen:  Blood from Arm, Right Updated:  12/04/18 1800     Blood Culture No growth at 4 days            Diagnostics:  Ct Head Without Contrast    Result Date: 11/30/2018  CT HEAD WITHOUT CONTRAST  HISTORY: Fall, on blood thinners.  A noncontrasted CT examination of the brain was  performed.  FINDINGS: The brain and ventricles are symmetrical. There is no evidence of intracranial hemorrhage, hydrocephalus or of abnormal extra-axial fluid. No focal area of decreased attenuation to suggest acute infarction is identified. Moderate vascular calcification is noted. Bone windows showed no evidence of a calvarial fracture.      No evidence of fracture or hemorrhage. Age-appropriate atrophy and vascular calcification noted similar to 11/11/2018. If indicated, further evaluation could be performed with a MRI examination of the brain.    Radiation dose reduction techniques were utilized, including automated exposure control and exposure modulation based on body size.  This report was finalized on 11/30/2018 6:38 PM by Dr. Dev Power M.D.      Ct Head Without Contrast    Result Date: 11/11/2018  CT HEAD WO CONTRAST-  INDICATIONS: Hallucinations  TECHNIQUE: Radiation dose reduction techniques were utilized, including automated exposure control and exposure modulation based on body size.    Noncontrast head CT  COMPARISON: None available  FINDINGS:       No acute intracranial hemorrhage, midline shift or mass effect. No acute territorial infarct is identified. Basal ganglia mineralization is seen.  Mild periventricular hypodensities suggest chronic small vessel ischemic change in a patient this age.  Arterial calcifications are seen in the base of the brain.  Ventricles, cisterns, cerebral sulci are unremarkable for patient age.  The visualized paranasal sinuses, orbits, mastoid air cells are unremarkable.               No acute intracranial hemorrhage or hydrocephalus. Chronic appearing changes. If there is further clinical concern, MRI could be considered for further evaluation.  This report was finalized on 11/11/2018 2:03 PM by Dr. Norberto Estrada M.D.      Ct Abdomen Pelvis With Contrast    Result Date: 11/30/2018  CT ABDOMEN PELVIS W CONTRAST-  CLINICAL HISTORY: Right upper quadrant.  Dementia.  TECHNIQUE: Spiral CT images were acquired through the abdomen and pelvis with no oral or IV contrast and were reconstructed in 3 mm thick slices.  Radiation dose reduction techniques were utilized, including automated exposure control and exposure modulation based on body size.  COMPARISON: None  FINDINGS: The striking finding on this exam is extremely large unilocular cystic mass essentially filling the entire abdomen and portions of the pelvis. The mass measures 24.8 cm in greatest cephalocaudad dimension and 13.6 cm in greatest AP dimension and 26.2 cm in greatest mediolateral dimension. This is most likely ovarian in origin. The mass superiorly displaces all of the abdominal organs and small bowel. Inferiorly, it compresses the dome of the urinary bladder. The bladder is markedly distended with urine. Markedly compresses the third portion of the duodenum. However, the stomach and proximal duodenum are not pathologically distended. There is no definite evidence of obstruction. No enhancing mural nodules or septations are present within the mass. There are multiple gallstones in the neck of the gallbladder. The gallbladder is moderately distended. There is mucosal hyperenhancement within the gallbladder and also moderate pericholecystic edema. The findings are consistent with acute cholecystitis. There is no bile duct dilatation. A few small hepatic cysts are noted. There are no solid liver masses. The spleen and pancreas and adrenal glands are unremarkable. There are a few small nonobstructing calculi in the right kidney. There are also multiple areas of cortical thinning in the right kidney consistent with scarring. There is no hydronephrosis. There is a small amount of ascites adjacent to the liver and also in the pelvis. The uterus is absent. A small posteriorly layering left pleural effusion is present.      Extremely large unilocular cystic mass filling the abdomen that is most likely ovarian in  origin as described more detail above. Cholelithiasis and evidence of acute cholecystitis as described. Minimal ascites. Small left pleural effusion.  This report was finalized on 11/30/2018 7:24 PM by Dr. Arturo Chawla M.D.      Xr Chest 1 View    Result Date: 11/30/2018  PORTABLE CHEST 11/30/2018 AT 4:47 PM  CLINICAL HISTORY: Hypotension. CHF.  Compared to the previous chest x-ray dated 8/18/2018.  The lungs are somewhat poorly inflated, but appear free of infiltrates. There are no pleural effusions. Calcified right hilar lymph nodes are noted. The heart is borderline enlarged and unchanged.  IMPRESSIONS: No evidence of acute disease within the chest.  This report was finalized on 11/30/2018 5:03 PM by Dr. Arturo Chawla M.D.      Results for orders placed during the hospital encounter of 04/04/18   Adult Transthoracic Echo Complete W/ Cont if Necessary Per Protocol    Narrative · Moderate-to-severe mitral valve regurgitation is present  · Mild to moderate tricuspid valve regurgitation is present.  · Left atrial cavity size is mildly dilated.  · Calculated EF = 50%.  · There is no evidence of pericardial effusion.              Assessment/Plan     1.  Acute cholecystitis with choledocholithiasis  2. Septic shock  3. Large cystic mass engulfing most of the pelvis and abdomen likely GYN in origin line urinary obstruction secondary to pelvic mass  4. Coronary disease severe  5. Moderate to severe mitral regurgitation  6. Advanced dementia  7. Hypokalemia  8. Elevated liver function test secondary #1  9. Anemia  10. Acute kidney injury      1. [place] for a palliative care scattered bed hospice      Anshu Alanis MD  12/05/18  3:47 PM    Time: Spent over 30 minutes arranging patient's discharge today transfer to scattered bed hospice etc.

## 2018-12-05 NOTE — PROGRESS NOTES
LOS: 4 days   Patient Care Team:  Queenie Alas PA-C as PCP - General  Queenie Aals PA-C as PCP - Family Medicine  Elias Noyola MD as Consulting Physician (Cardiology)    Subjective     Patient doesn't have any specific complaints her sister and nephew notes that she ate a whole frosty today and some Jell-O but she's not drinking much since she had little bit of breathing problems her oxygen was down for a while earlier and her blood pressures been dropping a little    Review of Systems:   Unable to obtain patient confused      Objective     Vital Signs  Vital Sign Min/Max for last 24 hours  Temp  Min: 96.8 °F (36 °C)  Max: 97.6 °F (36.4 °C)   BP  Min: 85/67  Max: 107/64   Pulse  Min: 86  Max: 96   Resp  Min: 16  Max: 18   SpO2  Min: 87 %  Max: 94 %   No Data Recorded   No Data Recorded        Ventilator/Non-Invasive Ventilation Settings (From admission, onward)    None                       Body mass index is 23.08 kg/m².  I/O last 3 completed shifts:  In: 180 [P.O.:180]  Out: 475 [Urine:475]  No intake/output data recorded.        Physical Exam:  General Appearance: Elderly white female resting in bed she really doesn't appear in acute distress   Eyes: Conjunctiva are clear and anicteric  ENT: Mucous membranes are dry no exudates  Neck: No JVD trachea midline no palpable adenopathy or thyromegaly  Lungs: Clear I don't hear any wheezes rales or rhonchi symmetric chest expansion she is not labored  Cardiac: Regular rhythm she is a little tachycardic no murmur  Abdomen: Is distended but soft knowing there is a mass that are still really can't palpate any margins of the mass.  She doesn't have any real right upper quadrant tenderness  : Turner catheter to dependent drainage  Musculoskeletal: Grossly normal  Skin: No jaundice, no petechiae no rashes noted  Neuro: She is pleasantly confused she is following commands moving her extremities  Extremities/P Vascular: No clubbing no cyanosis no edema  palpable radial dorsalis pedis pulses  MSE: She was a little more communicative today say much however       Labs:  Results from last 7 days   Lab Units  12/01/18   0539  11/30/18   1558   GLUCOSE mg/dL  87  81   SODIUM mmol/L  142  143   POTASSIUM mmol/L  2.2*  2.3*   MAGNESIUM mg/dL   --   1.9   CO2 mmol/L  25.3  28.9   CHLORIDE mmol/L  101  98   ANION GAP mmol/L  15.7  16.1   CREATININE mg/dL  0.94  1.19*   BUN mg/dL  20  20   BUN / CREAT RATIO   21.3  16.8   CALCIUM mg/dL  8.4*  9.4   EGFR IF NONAFRICN AM mL/min/1.73  56*  43*   ALK PHOS U/L  153*  213*   TOTAL PROTEIN g/dL  5.7*  7.3   ALT (SGPT) U/L  41*  59*   AST (SGOT) U/L  33*  50*   BILIRUBIN mg/dL  1.1  1.4*   ALBUMIN g/dL  2.20*  3.20*   GLOBULIN gm/dL  3.5  4.1     Estimated Creatinine Clearance: 39.1 mL/min (by C-G formula based on SCr of 0.94 mg/dL).      Results from last 7 days   Lab Units  12/01/18   0539  11/30/18   1558   WBC 10*3/mm3  14.17*  11.71*   RBC 10*6/mm3  3.45*  3.95   HEMOGLOBIN g/dL  10.5*  12.4   HEMATOCRIT %  33.4*  38.3   MCV fL  96.8  97.0   MCH pg  30.4  31.4   MCHC g/dL  31.4*  32.4   RDW %  13.9*  13.9*   RDW-SD fl  49.3  49.9   MPV fL  10.9  11.1   PLATELETS 10*3/mm3  105*  107*   NEUTROPHIL % %  78.9*  80.1*   LYMPHOCYTE % %  9.2*  9.9*   MONOCYTES % %  11.1  9.2   EOSINOPHIL % %  0.1*  0.1*   BASOPHIL % %  0.1  0.0   IMM GRAN % %  0.6*  0.7*   NEUTROS ABS 10*3/mm3  11.18*  9.38*   LYMPHS ABS 10*3/mm3  1.31  1.16   MONOS ABS 10*3/mm3  1.57*  1.08   EOS ABS 10*3/mm3  0.01  0.01   BASOS ABS 10*3/mm3  0.01  0.00   IMMATURE GRANS (ABS) 10*3/mm3  0.09*  0.08*         Results from last 7 days   Lab Units  11/30/18   1558   TROPONIN T ng/mL  0.014             Results from last 7 days   Lab Units  11/30/18   2351  11/30/18   1732  11/30/18   1558   LACTATE mmol/L  1.6  3.7*   --    PROCALCITONIN ng/mL   --    --   0.89*         Microbiology Results (last 10 days)     Procedure Component Value - Date/Time    Blood Culture - Blood,  Arm, Left [[id number]]  (Abnormal) Collected:  11/30/18 1805    Lab Status:  Final result Specimen:  Blood from Arm, Left Updated:  12/03/18 0734     Blood Culture Corynebacterium species     Comment: Probable contaminant requires clinical correlation, susceptibility not performed unless requested by physician.    No definitive guidelines. All are susceptible to vancomycin. Resistance to penicillins & cephalosporins does occur.        Isolated from Anaerobic Bottle     Gram Stain Anaerobic Bottle Gram positive cocci in clusters     Comment: Gram stain appears to be coccobacilli.        Blood Culture ID, PCR - Blood, Arm, Left [[id number]]  (Normal) Collected:  11/30/18 1805    Lab Status:  Final result Specimen:  Blood from Arm, Left Updated:  12/02/18 0913     BCID, PCR Negative by BCID PCR. Culture to Follow.    Blood Culture - Blood, Arm, Right [[id number]] Collected:  11/30/18 1732    Lab Status:  Preliminary result Specimen:  Blood from Arm, Right Updated:  12/04/18 1800     Blood Culture No growth at 4 days                Divalproex Sodium 250 mg Oral BID   donepezil 5 mg Oral Nightly   potassium chloride 10 mEq Intravenous Once          Diagnostics:  Ct Head Without Contrast    Result Date: 11/30/2018  CT HEAD WITHOUT CONTRAST  HISTORY: Fall, on blood thinners.  A noncontrasted CT examination of the brain was performed.  FINDINGS: The brain and ventricles are symmetrical. There is no evidence of intracranial hemorrhage, hydrocephalus or of abnormal extra-axial fluid. No focal area of decreased attenuation to suggest acute infarction is identified. Moderate vascular calcification is noted. Bone windows showed no evidence of a calvarial fracture.      No evidence of fracture or hemorrhage. Age-appropriate atrophy and vascular calcification noted similar to 11/11/2018. If indicated, further evaluation could be performed with a MRI examination of the brain.    Radiation dose reduction techniques were utilized,  including automated exposure control and exposure modulation based on body size.  This report was finalized on 11/30/2018 6:38 PM by Dr. Dev Power M.D.      Ct Head Without Contrast    Result Date: 11/11/2018  CT HEAD WO CONTRAST-  INDICATIONS: Hallucinations  TECHNIQUE: Radiation dose reduction techniques were utilized, including automated exposure control and exposure modulation based on body size.    Noncontrast head CT  COMPARISON: None available  FINDINGS:       No acute intracranial hemorrhage, midline shift or mass effect. No acute territorial infarct is identified. Basal ganglia mineralization is seen.  Mild periventricular hypodensities suggest chronic small vessel ischemic change in a patient this age.  Arterial calcifications are seen in the base of the brain.  Ventricles, cisterns, cerebral sulci are unremarkable for patient age.  The visualized paranasal sinuses, orbits, mastoid air cells are unremarkable.               No acute intracranial hemorrhage or hydrocephalus. Chronic appearing changes. If there is further clinical concern, MRI could be considered for further evaluation.  This report was finalized on 11/11/2018 2:03 PM by Dr. Norberto Estrada M.D.      Ct Abdomen Pelvis With Contrast    Result Date: 11/30/2018  CT ABDOMEN PELVIS W CONTRAST-  CLINICAL HISTORY: Right upper quadrant. Dementia.  TECHNIQUE: Spiral CT images were acquired through the abdomen and pelvis with no oral or IV contrast and were reconstructed in 3 mm thick slices.  Radiation dose reduction techniques were utilized, including automated exposure control and exposure modulation based on body size.  COMPARISON: None  FINDINGS: The striking finding on this exam is extremely large unilocular cystic mass essentially filling the entire abdomen and portions of the pelvis. The mass measures 24.8 cm in greatest cephalocaudad dimension and 13.6 cm in greatest AP dimension and 26.2 cm in greatest mediolateral dimension. This is  most likely ovarian in origin. The mass superiorly displaces all of the abdominal organs and small bowel. Inferiorly, it compresses the dome of the urinary bladder. The bladder is markedly distended with urine. Markedly compresses the third portion of the duodenum. However, the stomach and proximal duodenum are not pathologically distended. There is no definite evidence of obstruction. No enhancing mural nodules or septations are present within the mass. There are multiple gallstones in the neck of the gallbladder. The gallbladder is moderately distended. There is mucosal hyperenhancement within the gallbladder and also moderate pericholecystic edema. The findings are consistent with acute cholecystitis. There is no bile duct dilatation. A few small hepatic cysts are noted. There are no solid liver masses. The spleen and pancreas and adrenal glands are unremarkable. There are a few small nonobstructing calculi in the right kidney. There are also multiple areas of cortical thinning in the right kidney consistent with scarring. There is no hydronephrosis. There is a small amount of ascites adjacent to the liver and also in the pelvis. The uterus is absent. A small posteriorly layering left pleural effusion is present.      Extremely large unilocular cystic mass filling the abdomen that is most likely ovarian in origin as described more detail above. Cholelithiasis and evidence of acute cholecystitis as described. Minimal ascites. Small left pleural effusion.  This report was finalized on 11/30/2018 7:24 PM by Dr. Arturo Chawla M.D.      Xr Chest 1 View    Result Date: 11/30/2018  PORTABLE CHEST 11/30/2018 AT 4:47 PM  CLINICAL HISTORY: Hypotension. CHF.  Compared to the previous chest x-ray dated 8/18/2018.  The lungs are somewhat poorly inflated, but appear free of infiltrates. There are no pleural effusions. Calcified right hilar lymph nodes are noted. The heart is borderline enlarged and unchanged.  IMPRESSIONS: No  evidence of acute disease within the chest.  This report was finalized on 11/30/2018 5:03 PM by Dr. Arturo Chawla M.D.      Results for orders placed during the hospital encounter of 04/04/18   Adult Transthoracic Echo Complete W/ Cont if Necessary Per Protocol    Narrative · Moderate-to-severe mitral valve regurgitation is present  · Mild to moderate tricuspid valve regurgitation is present.  · Left atrial cavity size is mildly dilated.  · Calculated EF = 50%.  · There is no evidence of pericardial effusion.          Have reviewed the CT abdomen pelvis    Active Hospital Problems    Diagnosis Date Noted   • Septic shock (CMS/HCC) [A41.9, R65.21] 11/30/2018      Resolved Hospital Problems   No resolved problems to display.         Assessment/Plan     1. Acute cholecystitis with choledocholithiasis  2. Septic shock requiring vasopressor therapy.  3. Large cystic mass engulfing most of the pelvis and abdomen suspect GYN origin.  4. Urinary obstruction secondary to mass  5. Coronary artery disease severe  6. Valvular heart disease moderate to severe mitral regurgitation  7. Dementia  8. Hypokalemia  9. Elevated liver function tests secondary to #1  10.  anemia  11. Acute kidney injury improved    Patient actually looks pretty good her sister would like to be able take her home but just doesn't think she's going to be able to handle her.  It also promised her that they would never put her in a nursing home.  I don't know how quickly she is going to decline she's not taking much in orally which doesn't surprise me and I suspect that after the antibiotics been off several days she will start deteriorating clinically again but this is purely speculation.  Her sister sort of waiting to see what happens over the next couple of days and trying to review her options.  Sec maybe she's starting to deteriorate again    Plan for disposition:    Anshu Alanis MD  12/04/18  7:29 PM    Time:

## 2018-12-06 NOTE — PROGRESS NOTES
Case Management Discharge Note    Final Note: The patient  on  18 @23:30. SALTY Lott RN, CCP.     Destination - Selection Complete      Service Provider Request Status Selected Services Address Phone Number Fax Number    [facility] [address] [facility] [id number] [phone]      Durable Medical Equipment      No service has been selected for the patient.      Dialysis/Infusion      No service has been selected for the patient.      Home Medical Care      No service has been selected for the patient.      Community Resources      No service has been selected for the patient.             Final Discharge Disposition Code: 41 -  in medical facility

## 2018-12-11 NOTE — DISCHARGE SUMMARY
Date of Discharge:  2018    Discharge Diagnoses:  1.  Acute cholecystitis with choledocholithiasis  2. Septic shock  3. Large cystic mass engulfing most of the pelvis and abdomen likely GYN in origin line urinary obstruction secondary to pelvic mass  4. Coronary disease severe  5. Moderate to severe mitral regurgitation  6. Advanced dementia  7. Hypokalemia  8. Elevated liver function test secondary #1  9. Anemia  10. Acute kidney injury      Hospital Course  Patient is a 89 y.o. female presented with evidence of sepsis and septic shock she was found to have acute cholecystitis as part of that workup she had a CT scan of her abdomen which revealed this huge cystic mass in her abdomen 27 cm looks like originating in her pelvic region suspected GYN origin with this she had some urinary obstruction.  Patient has underlying dementia and severe coronary artery disease and valvular heart disease we discussed the family says previously cardiology told her she was not a candidate for surgical intervention.  We talked about surgery for her gallbladder was probably would require also intervention regarding this mass and the family particularly her sister is her next of kin and healthcare surrogate is her caregiver  the patient would never want this and they decided to go with palliative care transition to hospice scattered bed.  Patient         Anshu Alanis MD  18  8:23 AM    Time:

## 2020-06-26 NOTE — NURSING NOTE
Contacted patient to discuss INR results.  Last INR 6-17-20 was 2.4.  Dose increased per protocol.   Today's INR is 3.0 and is within goal range.    Current warfarin dosing verified with patient. patient was informed that their INR result is within therapeutic range and instructed to maintain current dose per protocol. Discussed dose and return date for next INR.    Dr. hollis is in the office today supervising the treatment.         Per order from Dr. Norton the patient is allowed to have her 13 year old niece visit with the patient in the patient's room.

## (undated) DEVICE — GLIDESHEATH BASIC HYDROPHILIC COATED INTRODUCER SHEATH: Brand: GLIDESHEATH

## (undated) DEVICE — CATH DIAG IMPULSE FL3.5 5F 100CM

## (undated) DEVICE — CATH VENT MIV RADL PIG ST TIP 4F 110CM

## (undated) DEVICE — CATH DIAG IMPULSE FR4 5F 100CM

## (undated) DEVICE — GW EMR FIX EXCHG J STD .035 3MM 260CM

## (undated) DEVICE — KT MANIFLD CARDIAC

## (undated) DEVICE — CATH DIAG CARD PERFORMA MPA1 BT 4F 100CM

## (undated) DEVICE — ANGIOGRAPHIC CATHETER: Brand: EXPO™

## (undated) DEVICE — RADIFOCUS GLIDEWIRE: Brand: GLIDEWIRE

## (undated) DEVICE — HI-TORQUE BALANCE MIDDLEWEIGHT GUIDE WIRE .014 STRAIGHT TIP 3.0 CM X 190 CM: Brand: HI-TORQUE BALANCE MIDDLEWEIGHT

## (undated) DEVICE — PK CATH CARD 40

## (undated) DEVICE — CATH DIAG CARD PERFORM JR4.0 BT 4F100CM

## (undated) DEVICE — INTRO SHEATH ART/FEM ENGAGE .035 4F12CM